# Patient Record
Sex: FEMALE | Race: WHITE | NOT HISPANIC OR LATINO | Employment: FULL TIME | URBAN - METROPOLITAN AREA
[De-identification: names, ages, dates, MRNs, and addresses within clinical notes are randomized per-mention and may not be internally consistent; named-entity substitution may affect disease eponyms.]

---

## 2017-05-26 ENCOUNTER — ALLSCRIPTS OFFICE VISIT (OUTPATIENT)
Dept: OTHER | Facility: OTHER | Age: 27
End: 2017-05-26

## 2017-05-26 DIAGNOSIS — R10.2 PELVIC AND PERINEAL PAIN: ICD-10-CM

## 2017-05-26 PROCEDURE — G0143 SCR C/V CYTO,THINLAYER,RESCR: HCPCS | Performed by: OBSTETRICS & GYNECOLOGY

## 2017-05-27 ENCOUNTER — LAB REQUISITION (OUTPATIENT)
Dept: LAB | Facility: HOSPITAL | Age: 27
End: 2017-05-27
Payer: COMMERCIAL

## 2017-05-27 DIAGNOSIS — Z01.419 ENCOUNTER FOR GYNECOLOGICAL EXAMINATION WITHOUT ABNORMAL FINDING: ICD-10-CM

## 2017-06-05 LAB
LAB AP GYN PRIMARY INTERPRETATION: NORMAL
Lab: NORMAL

## 2017-06-06 ENCOUNTER — GENERIC CONVERSION - ENCOUNTER (OUTPATIENT)
Dept: OTHER | Facility: OTHER | Age: 27
End: 2017-06-06

## 2017-06-12 ENCOUNTER — HOSPITAL ENCOUNTER (OUTPATIENT)
Dept: RADIOLOGY | Age: 27
Discharge: HOME | End: 2017-06-12
Payer: COMMERCIAL

## 2017-06-12 DIAGNOSIS — R10.2 PELVIC AND PERINEAL PAIN: ICD-10-CM

## 2017-06-12 PROCEDURE — 76830 TRANSVAGINAL US NON-OB: CPT

## 2017-06-12 PROCEDURE — 76856 US EXAM PELVIC COMPLETE: CPT

## 2017-06-16 ENCOUNTER — GENERIC CONVERSION - ENCOUNTER (OUTPATIENT)
Dept: OTHER | Facility: OTHER | Age: 27
End: 2017-06-16

## 2017-07-14 ENCOUNTER — APPOINTMENT (OUTPATIENT)
Dept: LAB | Facility: CLINIC | Age: 27
End: 2017-07-14
Payer: COMMERCIAL

## 2017-07-14 ENCOUNTER — TRANSCRIBE ORDERS (OUTPATIENT)
Dept: LAB | Facility: CLINIC | Age: 27
End: 2017-07-14

## 2017-07-14 DIAGNOSIS — Z00.8 HEALTH EXAMINATION IN POPULATION SURVEYS: Primary | ICD-10-CM

## 2017-07-14 DIAGNOSIS — Z00.8 HEALTH EXAMINATION IN POPULATION SURVEYS: ICD-10-CM

## 2017-07-14 LAB
CHOLEST SERPL-MCNC: 172 MG/DL (ref 50–200)
EST. AVERAGE GLUCOSE BLD GHB EST-MCNC: 97 MG/DL
HBA1C MFR BLD: 5 % (ref 4.2–6.3)
HDLC SERPL-MCNC: 59 MG/DL (ref 40–60)
LDLC SERPL CALC-MCNC: 85 MG/DL (ref 0–100)
TRIGL SERPL-MCNC: 140 MG/DL

## 2017-07-14 PROCEDURE — 80061 LIPID PANEL: CPT

## 2017-07-14 PROCEDURE — 83036 HEMOGLOBIN GLYCOSYLATED A1C: CPT

## 2017-07-14 PROCEDURE — 36415 COLL VENOUS BLD VENIPUNCTURE: CPT

## 2017-08-03 ENCOUNTER — ALLSCRIPTS OFFICE VISIT (OUTPATIENT)
Dept: OTHER | Facility: OTHER | Age: 27
End: 2017-08-03

## 2018-01-11 NOTE — RESULT NOTES
Verified Results  (1) THIN PREP PAP WITH IMAGING [id number] 12:00AM Aline Marrero     Test Name Result Flag Reference   LAB AP CASE REPORT (Report)     Gynecologic Cytology Report            Case: [id number]                  Authorizing Provider: Merrick Armstrong MD     Collected:      05/26/2017           First Screen:     ASTON Francis    Received:      05/30/2017 1413        Specimen:  LIQUID-BASED PAP, SCREENING, Endocervical   LAB AP GYN PRIMARY INTERPRETATION      Negative for intraepithelial lesion or malignancy  Electronically signed by ASTON Francis on 6/5/2017 at 4:11 PM   LAB AP GYN SPECIMEN ADEQUACY      Satisfactory for evaluation  Endocervical/transformation zone component present  LAB AP GYN NOTE      This specimen was analyzed by the Thin Prep Imaging System  Due to   technical Imaging issues or the physical properties of the slide specimen,   comprehensive manual rescreening by a Cytotechnologist, and/or Pathologist   was indicated  LAB AP GYN ADDITIONAL INFORMATION (Report)     [employer]'s FDA approved ,  and ThinPrep Imaging System are   utilized with strict adherence to the 's instruction manual to   prepare gynecologic and non-gynecologic cytology specimens for the   production of ThinPrep slides as well as for gynecologic ThinPrep imaging  These processes have been validated by our laboratory and/or by the     The Pap test is not a diagnostic procedure and should not be used as the   sole means to detect cervical cancer  It is only a screening procedure to   aid in the detection of cervical cancer and its precursors  Both   false-negative and false-positive results have been experienced  Your   patient's test result should be interpreted in this context together with   the history and clinical findings

## 2018-01-12 VITALS
DIASTOLIC BLOOD PRESSURE: 66 MMHG | WEIGHT: 143 LBS | BODY MASS INDEX: 26.31 KG/M2 | SYSTOLIC BLOOD PRESSURE: 110 MMHG | HEIGHT: 62 IN

## 2018-01-13 VITALS
DIASTOLIC BLOOD PRESSURE: 70 MMHG | HEART RATE: 107 BPM | WEIGHT: 137.05 LBS | SYSTOLIC BLOOD PRESSURE: 110 MMHG | HEIGHT: 62 IN | BODY MASS INDEX: 25.22 KG/M2 | OXYGEN SATURATION: 99 % | RESPIRATION RATE: 20 BRPM

## 2018-01-13 NOTE — RESULT NOTES
Verified Results  * US PELVIS COMPLETE (TRANSABDOMINAL AND TRANSVAGINAL) [id number] 11:02AM Dottie Hendricks Order Number: [id number]    - Patient Instructions: To schedule this appointment, please contact Central Scheduling at [phone]  Test Name Result Flag Reference   US PELVIS COMPLETE (TRANSABDOMINAL AND TRANSVAGINAL) (Report)     PELVIC ULTRASOUND, COMPLETE     INDICATION: Pelvic pain  COMPARISON: None  TECHNIQUE:  Transabdominal pelvic ultrasound was performed in sagittal and transverse planes with a curvilinear transducer  Additional transvaginal imaging was performed to better evaluate the endometrium and ovaries  Imaging included volumetric    sweeps as well as traditional still imaging technique  FINDINGS:     UTERUS:   The uterus is anteverted in position, measuring 8 8 x 2 8 x 3 9 cm  Contour and echotexture appear normal      The cervix shows no suspicious abnormality  ENDOMETRIUM:    Normal caliber of 4 mm  Homogenous and normal in appearance  OVARIES/ADNEXA:   Right ovary: 2 8 x 1 5 x 2 4 cm  No suspicious right ovarian abnormality  Doppler flow within normal limits  Left ovary: 3 2 x 1 5 x 2 1 cm  No suspicious left ovarian abnormality  Doppler flow within normal limits  No suspicious adnexal mass or loculated collections  There is no free fluid         IMPRESSION:      Normal transabdominal and transvaginal pelvic ultrasound          Workstation performed: [id number]     Signed by:   Tram Lay MD   6/14/17

## 2018-02-23 ENCOUNTER — TELEPHONE (OUTPATIENT)
Dept: INTERNAL MEDICINE CLINIC | Facility: CLINIC | Age: 28
End: 2018-02-23

## 2018-02-23 DIAGNOSIS — Z20.828 EXPOSURE TO THE FLU: Primary | ICD-10-CM

## 2018-02-25 RX ORDER — OSELTAMIVIR PHOSPHATE 75 MG/1
75 CAPSULE ORAL DAILY
Qty: 10 CAPSULE | Refills: 0 | Status: SHIPPED | OUTPATIENT
Start: 2018-02-25 | End: 2018-03-07

## 2018-02-26 ENCOUNTER — OFFICE VISIT (OUTPATIENT)
Dept: INTERNAL MEDICINE CLINIC | Facility: CLINIC | Age: 28
End: 2018-02-26
Payer: COMMERCIAL

## 2018-02-26 VITALS
SYSTOLIC BLOOD PRESSURE: 120 MMHG | BODY MASS INDEX: 25.36 KG/M2 | DIASTOLIC BLOOD PRESSURE: 82 MMHG | OXYGEN SATURATION: 98 % | WEIGHT: 137.8 LBS | HEART RATE: 100 BPM | TEMPERATURE: 98.6 F | HEIGHT: 62 IN | RESPIRATION RATE: 18 BRPM

## 2018-02-26 DIAGNOSIS — J06.9 UPPER RESPIRATORY TRACT INFECTION, UNSPECIFIED TYPE: Primary | ICD-10-CM

## 2018-02-26 PROCEDURE — 99213 OFFICE O/P EST LOW 20 MIN: CPT | Performed by: INTERNAL MEDICINE

## 2018-02-26 RX ORDER — NORGESTIMATE AND ETHINYL ESTRADIOL
KIT
COMMUNITY
Start: 2017-12-08 | End: 2018-05-31 | Stop reason: SDUPTHER

## 2018-02-26 NOTE — PROGRESS NOTES
Assessment/Plan:    URI (upper respiratory infection)    Patient was already given a prescription for Tamiflu to take once daily for 10 days due to exposure to her daughter with influenza  Since patient has developed symptoms, I recommend she take the treatment dose of 1 tablet twice a day for 5 days  Follow up if not improving  Diagnoses and all orders for this visit:    Upper respiratory tract infection, unspecified type    Other orders  -     TRI-LO-SPRINTEC 0 18/0 215/0 25 MG-25 MCG per tablet;           Subjective:      Patient ID: Renee Boyer is a 32 y o  female  Two days ago patient had a temperature of 101 3°, chills, body aches, her daughter was diagnosed with influenza the day previous to this  Patient has cough  The following portions of the patient's history were reviewed and updated as appropriate: allergies, current medications, past family history, past medical history, past social history, past surgical history and problem list     Review of Systems   Constitutional: Positive for chills, fatigue and fever  HENT: Negative for sore throat  Respiratory: Positive for cough  Negative for shortness of breath and wheezing  Cardiovascular: Negative for chest pain  Gastrointestinal: Negative for constipation and diarrhea  Genitourinary: Negative for dysuria  Neurological: Positive for headaches  Objective:      /82   Pulse 100   Temp 98 6 °F (37 °C) (Oral)   Resp 18   Ht 5' 2" (1 575 m)   Wt 62 5 kg (137 lb 12 8 oz)   SpO2 98%   BMI 25 20 kg/m²          Physical Exam   Constitutional: She appears well-developed and well-nourished  HENT:   Mouth/Throat: Oropharyngeal exudate present  Eyes: No scleral icterus  Neck: No tracheal deviation present  Pulmonary/Chest: Effort normal and breath sounds normal  No stridor  No respiratory distress  She has no wheezes  She has no rales  Vitals reviewed

## 2018-02-26 NOTE — ASSESSMENT & PLAN NOTE
Patient was already given a prescription for Tamiflu to take once daily for 10 days due to exposure to her daughter with influenza  Since patient has developed symptoms, I recommend she take the treatment dose of 1 tablet twice a day for 5 days  Follow up if not improving

## 2018-05-29 ENCOUNTER — HOSPITAL ENCOUNTER (EMERGENCY)
Facility: HOSPITAL | Age: 28
Discharge: HOME | End: 2018-05-29
Attending: EMERGENCY MEDICINE
Payer: COMMERCIAL

## 2018-05-29 VITALS
HEART RATE: 86 BPM | RESPIRATION RATE: 18 BRPM | DIASTOLIC BLOOD PRESSURE: 76 MMHG | SYSTOLIC BLOOD PRESSURE: 155 MMHG | TEMPERATURE: 98.4 F | OXYGEN SATURATION: 100 %
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
## 2018-05-29 DIAGNOSIS — S40.861A INSECT BITE OF RIGHT UPPER EXTREMITY, INITIAL ENCOUNTER: Primary | ICD-10-CM

## 2018-05-29 PROCEDURE — 99281 EMR DPT VST MAYX REQ PHY/QHP: CPT

## 2018-05-29 RX ORDER — CEPHALEXIN 500 MG/1
500 CAPSULE ORAL 4 TIMES DAILY
Qty: 28 CAPSULE | Refills: 0 | Status: SHIPPED | OUTPATIENT
Start: 2018-05-29 | End: 2018-06-05

## 2018-05-29 NOTE — DISCHARGE INSTRUCTIONS
Diagnosis: right upper arm insect bite    - please do not scratch area - do not want a secondary bacterial infection     - wash area daily with soap and water     - I will give you a prescription for antibiotics- do nto fill them  A t present- I would only start them for any spreading upward spreading of redness/ any red streaks extending up arm / any fevers- temp > 100 4- or any new/ worsening/concerning symptoms to you

## 2018-05-29 NOTE — ED PROVIDER NOTES
History  Chief Complaint   Patient presents with    Insect Bite     Pt  with a bug bite to right foreram  Pt  with marked circular redness around bite shelia  Bite happened on Saturday     27 yr feale states probable bug bite to r forearm yesterday -- did not see bug-- today with midl surrounding reddness--  midly itchy -- no fevers/systemic comps-- no other comps        History provided by:  Patient   used: No        Prior to Admission Medications   Prescriptions Last Dose Informant Patient Reported? Taking? TRI-LO-SPRINTEC 0 18/0 215/0 25 MG-25 MCG per tablet   Yes No      Facility-Administered Medications: None       History reviewed  No pertinent past medical history  Past Surgical History:   Procedure Laterality Date    TONSILLECTOMY  2013       Family History   Problem Relation Age of Onset    Lymphoma Father      I have reviewed and agree with the history as documented  Social History   Substance Use Topics    Smoking status: Never Smoker    Smokeless tobacco: Never Used    Alcohol use No        Review of Systems   Constitutional: Negative  HENT: Negative  Eyes: Negative  Respiratory: Negative  Cardiovascular: Negative  Gastrointestinal: Negative  Endocrine: Negative  Genitourinary: Negative  Musculoskeletal: Negative  Skin: Positive for rash  Negative for color change, pallor and wound  Allergic/Immunologic: Negative  Neurological: Negative  Hematological: Negative  Psychiatric/Behavioral: Negative  Physical Exam  Physical Exam   Constitutional: She is oriented to person, place, and time  She appears well-developed and well-nourished  No distress  avss-- pulse ox 100 % onb ra- interpretation is normal- no intervention- well appearing- in nad   HENT:   Head: Normocephalic and atraumatic  Eyes: Conjunctivae and EOM are normal  Pupils are equal, round, and reactive to light  Right eye exhibits no discharge   Left eye exhibits no discharge  No scleral icterus  Neck: Normal range of motion  Neck supple  No JVD present  No tracheal deviation present  No thyromegaly present  Cardiovascular: Normal rate, regular rhythm, normal heart sounds and intact distal pulses  Exam reveals no gallop and no friction rub  No murmur heard  Pulmonary/Chest: Effort normal and breath sounds normal  No stridor  No respiratory distress  She has no wheezes  She has no rales  She exhibits no tenderness  Abdominal: Soft  Bowel sounds are normal  She exhibits no distension and no mass  There is no tenderness  There is no rebound and no guarding  No hernia  Musculoskeletal: Normal range of motion  She exhibits no edema, tenderness or deformity  Lymphadenopathy:     She has no cervical adenopathy  Neurological: She is alert and oriented to person, place, and time  No cranial nerve deficit or sensory deficit  She exhibits normal muscle tone  Coordination normal    Skin: Skin is warm  Capillary refill takes less than 2 seconds  Rash noted  She is not diaphoretic  There is erythema  No pallor  r mid flexor forearm with quarter sized area of erythema with central papule-- no abscess/crepitus/necrosis// no ascending erythema-- no r axillary adenopahty   Psychiatric: She has a normal mood and affect  Her behavior is normal  Judgment and thought content normal    Nursing note and vitals reviewed        Vital Signs  ED Triage Vitals [05/29/18 1801]   Temperature Pulse Respirations Blood Pressure SpO2   98 4 °F (36 9 °C) 86 18 155/76 100 %      Temp Source Heart Rate Source Patient Position - Orthostatic VS BP Location FiO2 (%)   Oral Monitor Sitting Left arm --      Pain Score       No Pain           Vitals:    05/29/18 1801   BP: 155/76   Pulse: 86   Patient Position - Orthostatic VS: Sitting       Visual Acuity      ED Medications  Medications - No data to display    Diagnostic Studies  Results Reviewed     None                 No orders to display Procedures  Procedures       Phone Contacts  ED Phone Contact    ED Course                               MDM  CritCare Time    Disposition  Final diagnoses:   None     ED Disposition     None      Follow-up Information    None         Patient's Medications   Discharge Prescriptions    No medications on file     No discharge procedures on file      ED Provider  Electronically Signed by           Noe Stover MD  05/30/18 4068

## 2018-05-30 NOTE — PROGRESS NOTES
Assessment/Plan:    Encounter for gynecological examination without abnormal finding    Surveillance for birth control, oral contraceptives  -     TRI-LO-SPRINTEC 0 18/0 215/0 25 MG-25 MCG per tablet; Take 1 tablet by mouth daily      She will call with her next menstrual period for [place] placement  Discussed risks, benefits and alternatives with her  Subjective:      Patient ID: Karmen Cho is a 32 y o  female  Yearly exam  , has a daughter - 3yo  Menarche: 10  LMP: 2018  PAP: 2017 neg  Contraception: OCPs, but considering Merlene Fields is new patient here for annual exam, saw Dr Arina Cantrell last year  She is doing well  Is getting  this Saturday in [place]! She is very excited - will be using [employer]! She would like to consider a [place] as she does not plan on having any more kids  Works as  with orthopedics  No bowel or bladder concerns  Thinks her breasts are slightly asymetric, but have always been  No significant PMHx or PSHx  Has some occasional deep dyspareunia, had normal US previously  Some positions are more uncomfortable than others  The following portions of the patient's history were reviewed and updated as appropriate: allergies, current medications, past family history, past medical history, past social history, past surgical history and problem list     Review of Systems   Constitutional: Negative for activity change and unexpected weight change  Gastrointestinal: Negative for abdominal distention, abdominal pain, constipation and diarrhea  Genitourinary: Negative for dyspareunia, dysuria, menstrual problem, pelvic pain, vaginal bleeding, vaginal discharge and vaginal pain  Objective:    /70   Ht 5' 1" (1 549 m)   Wt 60 7 kg (133 lb 12 8 oz)   LMP 2018   BMI 25 28 kg/m²        Physical Exam   Constitutional: She is oriented to person, place, and time   She appears well-developed and well-nourished  No distress  Pulmonary/Chest: No respiratory distress  Right breast exhibits no inverted nipple, no mass, no nipple discharge, no skin change and no tenderness  Left breast exhibits no inverted nipple, no mass, no nipple discharge, no skin change and no tenderness  Abdominal: Soft  Normal appearance  There is no tenderness  Genitourinary: Vagina normal  There is no rash or lesion on the right labia  There is no rash or lesion on the left labia  Uterus is not enlarged and not tender  Cervix exhibits no motion tenderness  Right adnexum displays no mass and no tenderness  Left adnexum displays no mass and no tenderness  No vaginal discharge found  Musculoskeletal: She exhibits no edema  Neurological: She is alert and oriented to person, place, and time  Skin: Skin is warm and dry  Psychiatric: She has a normal mood and affect  Vitals reviewed

## 2018-05-31 ENCOUNTER — OFFICE VISIT (OUTPATIENT)
Dept: OBGYN CLINIC | Facility: CLINIC | Age: 28
End: 2018-05-31
Payer: COMMERCIAL

## 2018-05-31 VITALS
DIASTOLIC BLOOD PRESSURE: 70 MMHG | BODY MASS INDEX: 25.26 KG/M2 | WEIGHT: 133.8 LBS | SYSTOLIC BLOOD PRESSURE: 102 MMHG | HEIGHT: 61 IN

## 2018-05-31 DIAGNOSIS — Z01.419 ENCOUNTER FOR GYNECOLOGICAL EXAMINATION WITHOUT ABNORMAL FINDING: Primary | ICD-10-CM

## 2018-05-31 DIAGNOSIS — Z30.41 SURVEILLANCE FOR BIRTH CONTROL, ORAL CONTRACEPTIVES: ICD-10-CM

## 2018-05-31 PROBLEM — J06.9 URI (UPPER RESPIRATORY INFECTION): Status: RESOLVED | Noted: 2018-02-26 | Resolved: 2018-05-31

## 2018-05-31 PROCEDURE — 99385 PREV VISIT NEW AGE 18-39: CPT | Performed by: OBSTETRICS & GYNECOLOGY

## 2018-05-31 RX ORDER — NORGESTIMATE AND ETHINYL ESTRADIOL
1 KIT DAILY
Qty: 28 TABLET | Refills: 3 | Status: SHIPPED | OUTPATIENT
Start: 2018-05-31 | End: 2018-09-06 | Stop reason: SDUPTHER

## 2018-06-04 ENCOUNTER — TELEPHONE (OUTPATIENT)
Dept: OBGYN CLINIC | Facility: CLINIC | Age: 28
End: 2018-06-04

## 2018-06-04 NOTE — TELEPHONE ENCOUNTER
Apologies - await [employer] delivery from [facility]

## 2018-06-04 NOTE — TELEPHONE ENCOUNTER
Lakesha Villafana labeled and placed in the [facility] med room  Pt can be contacted to schedule insertion  Thank you!

## 2018-06-07 NOTE — TELEPHONE ENCOUNTER
Matias and [employer] labeled and placed in the [facility] room  Pt can be contacted for insertion  Thank you!

## 2018-06-07 NOTE — TELEPHONE ENCOUNTER
Matias and [employer] labeled and placed in [facility] room  Pt can be scheduled for insertion  Thank you!

## 2018-06-10 ENCOUNTER — OFFICE VISIT (OUTPATIENT)
Dept: URGENT CARE | Age: 28
End: 2018-06-10
Payer: COMMERCIAL

## 2018-06-10 VITALS
WEIGHT: 135 LBS | TEMPERATURE: 98.6 F | BODY MASS INDEX: 23.92 KG/M2 | RESPIRATION RATE: 20 BRPM | SYSTOLIC BLOOD PRESSURE: 128 MMHG | DIASTOLIC BLOOD PRESSURE: 92 MMHG | HEART RATE: 94 BPM | HEIGHT: 63 IN

## 2018-06-10 DIAGNOSIS — J06.9 UPPER RESPIRATORY TRACT INFECTION, UNSPECIFIED TYPE: ICD-10-CM

## 2018-06-10 DIAGNOSIS — H66.002 ACUTE SUPPURATIVE OTITIS MEDIA OF LEFT EAR WITHOUT SPONTANEOUS RUPTURE OF TYMPANIC MEMBRANE, RECURRENCE NOT SPECIFIED: Primary | ICD-10-CM

## 2018-06-10 PROCEDURE — 99213 OFFICE O/P EST LOW 20 MIN: CPT | Performed by: FAMILY MEDICINE

## 2018-06-10 PROCEDURE — S9088 SERVICES PROVIDED IN URGENT: HCPCS | Performed by: FAMILY MEDICINE

## 2018-06-10 RX ORDER — AMOXICILLIN 500 MG/1
500 TABLET, FILM COATED ORAL 3 TIMES DAILY
Qty: 30 TABLET | Refills: 0 | Status: SHIPPED | COMMUNITY
Start: 2018-06-10 | End: 2018-06-20

## 2018-06-10 NOTE — PROGRESS NOTES
[facility]        NAME: Amparo Boykin is a 32 y o  female  : 1990    MRN: [id number]  DATE: Nahomy 10, 2018  TIME: 1:26 PM    Assessment and Plan   Acute suppurative otitis media of left ear without spontaneous rupture of tympanic membrane, recurrence not specified [H66 002]  1  Acute suppurative otitis media of left ear without spontaneous rupture of tympanic membrane, recurrence not specified  amoxicillin (AMOXIL) 500 MG tablet   2  Upper respiratory tract infection, unspecified type           Patient Instructions     Patient Instructions   1  Take antibiotic as directed  2  Recommend daily probiotic while on antibiotic or eat yogurt with live cultures daily while on antibiotic  3  Push fluids  4  Continue decongestant by mouth and add Nasacort AQ and / or nasal saline rinses  5   Advil for any pain  6   Follow up with PCP if not improving over next 7-10 days  Chief Complaint     Chief Complaint   Patient presents with   Jose Vasquez     She feels that her left ear is clogged and the right slightly- just returned from a trip and was flying         History of Present Illness   Amparo Boykin presents to the clinic c/o    40-year-old female that developed cold symptoms when she was on her honeymoon in [place] this last week  She reported green to yellow mucus just coming out the left side of her nose  Still has some of that drainage however now her left ear is completely clogged  She is not able to equalize the pressure  She tried a decongestant last night without any relief  Denies history of recurrent sinus or ear infections  Review of Systems   Review of Systems   Constitutional: Negative  HENT: Positive for congestion, ear pain, hearing loss, postnasal drip and rhinorrhea  Negative for ear discharge and sore throat  Eyes: Negative  Respiratory: Negative            Current Medications     Long-Term Prescriptions   Medication Sig Dispense Refill    TRI-LO-SPRINTEC 0 18/0 215/0 25 MG-25 MCG per tablet Take 1 tablet by mouth daily 28 tablet 3       Current Allergies     Allergies as of 06/10/2018 - Reviewed 06/10/2018   Allergen Reaction Noted    Sulfa antibiotics  03/09/2015            The following portions of the patient's history were reviewed and updated as appropriate: allergies, current medications, past family history, past medical history, past social history, past surgical history and problem list     Objective   /92 (BP Location: Right arm, Patient Position: Sitting, Cuff Size: Standard)   Pulse 94   Temp 98 6 °F (37 °C) (Temporal)   Resp 20   Ht 5' 3" (1 6 m)   Wt 61 2 kg (135 lb)   LMP 06/03/2018   BMI 23 91 kg/m²        Physical Exam     Physical Exam   Constitutional: She is oriented to person, place, and time  She appears well-developed and well-nourished  No distress  Appears acutely ill but in no acute distress   HENT:   Head: Normocephalic and atraumatic  Right Ear: External ear normal    Left Ear: Ear canal normal  There is swelling  No drainage or tenderness  No foreign bodies  No mastoid tenderness  Tympanic membrane is injected, erythematous and bulging  Tympanic membrane mobility is abnormal  A middle ear effusion is present  Decreased hearing is noted  Cobblestoning of posterior pharynx with patchy redness  No exudate or fetid breath  Nasal turbinates red and edematous  No purulent mucus   Eyes: Conjunctivae and EOM are normal  Pupils are equal, round, and reactive to light  Right eye exhibits no discharge  Left eye exhibits no discharge  Neck: Normal range of motion  Neck supple  Cardiovascular: Normal rate, regular rhythm and normal heart sounds  Pulmonary/Chest: Effort normal and breath sounds normal  No respiratory distress  Lymphadenopathy:     She has no cervical adenopathy  Neurological: She is alert and oriented to person, place, and time  Skin: Skin is warm and dry  No rash noted   She is not diaphoretic  Psychiatric: She has a normal mood and affect  Nursing note and vitals reviewed

## 2018-06-10 NOTE — PATIENT INSTRUCTIONS
1  Take antibiotic as directed  2  Recommend daily probiotic while on antibiotic or eat yogurt with live cultures daily while on antibiotic  3  Push fluids  4  Continue decongestant by mouth and add Nasacort AQ and / or nasal saline rinses  5   Advil for any pain  6   Follow up with PCP if not improving over next 7-10 days

## 2018-06-13 ENCOUNTER — TELEPHONE (OUTPATIENT)
Dept: OBGYN CLINIC | Facility: CLINIC | Age: 28
End: 2018-06-13

## 2018-06-13 NOTE — TELEPHONE ENCOUNTER
Pt saw missed - asked if she wanted to candelaria IUD insertion & she said she's still thinking about whether or not she wants to go this route - she will call back at a later date

## 2018-07-12 ENCOUNTER — TRANSCRIBE ORDERS (OUTPATIENT)
Dept: LAB | Facility: CLINIC | Age: 28
End: 2018-07-12

## 2018-07-12 ENCOUNTER — APPOINTMENT (OUTPATIENT)
Dept: LAB | Facility: CLINIC | Age: 28
End: 2018-07-12
Payer: COMMERCIAL

## 2018-07-12 DIAGNOSIS — Z00.8 HEALTH EXAMINATION IN POPULATION SURVEY: Primary | ICD-10-CM

## 2018-07-12 DIAGNOSIS — Z00.8 HEALTH EXAMINATION IN POPULATION SURVEY: ICD-10-CM

## 2018-07-12 LAB
CHOLEST SERPL-MCNC: 163 MG/DL (ref 50–200)
EST. AVERAGE GLUCOSE BLD GHB EST-MCNC: 94 MG/DL
HBA1C MFR BLD: 4.9 % (ref 4.2–6.3)
HDLC SERPL-MCNC: 64 MG/DL (ref 40–60)
LDLC SERPL CALC-MCNC: 83 MG/DL (ref 0–100)
NONHDLC SERPL-MCNC: 99 MG/DL
TRIGL SERPL-MCNC: 81 MG/DL

## 2018-07-12 PROCEDURE — 36415 COLL VENOUS BLD VENIPUNCTURE: CPT

## 2018-07-12 PROCEDURE — 80061 LIPID PANEL: CPT

## 2018-07-12 PROCEDURE — 83036 HEMOGLOBIN GLYCOSYLATED A1C: CPT

## 2018-09-06 DIAGNOSIS — Z30.41 SURVEILLANCE FOR BIRTH CONTROL, ORAL CONTRACEPTIVES: Primary | ICD-10-CM

## 2018-09-07 RX ORDER — NORGESTIMATE AND ETHINYL ESTRADIOL
1 KIT DAILY
Qty: 90 TABLET | Refills: 2 | Status: SHIPPED | OUTPATIENT
Start: 2018-09-07 | End: 2019-05-20 | Stop reason: SDUPTHER

## 2018-11-04 ENCOUNTER — OFFICE VISIT (OUTPATIENT)
Dept: URGENT CARE | Facility: CLINIC | Age: 28
End: 2018-11-04
Payer: COMMERCIAL

## 2018-11-04 VITALS
DIASTOLIC BLOOD PRESSURE: 82 MMHG | TEMPERATURE: 98.4 F | WEIGHT: 141.2 LBS | BODY MASS INDEX: 25.98 KG/M2 | OXYGEN SATURATION: 99 % | HEART RATE: 73 BPM | RESPIRATION RATE: 16 BRPM | SYSTOLIC BLOOD PRESSURE: 124 MMHG | HEIGHT: 62 IN

## 2018-11-04 DIAGNOSIS — J01.00 ACUTE MAXILLARY SINUSITIS, RECURRENCE NOT SPECIFIED: Primary | ICD-10-CM

## 2018-11-04 PROCEDURE — S9088 SERVICES PROVIDED IN URGENT: HCPCS | Performed by: FAMILY MEDICINE

## 2018-11-04 PROCEDURE — 99213 OFFICE O/P EST LOW 20 MIN: CPT | Performed by: FAMILY MEDICINE

## 2018-11-04 RX ORDER — AZITHROMYCIN 250 MG/1
TABLET, FILM COATED ORAL
Qty: 6 TABLET | Refills: 0 | Status: SHIPPED | COMMUNITY
Start: 2018-11-04 | End: 2018-11-08

## 2018-11-04 NOTE — PROGRESS NOTES
[facility]        NAME: Carlyn Driscoll is a 32 y o  female  : 1990    MRN: [id number]  DATE: 2018  TIME: 11:31 AM    Assessment and Plan   Acute maxillary sinusitis, recurrence not specified [J01 00]  1  Acute maxillary sinusitis, recurrence not specified  azithromycin (ZITHROMAX) 250 mg tablet         Patient Instructions   Patient Instructions   Continue nasocort daily  Azithromycin 250mg 2 tablets today then 1 tablet daily for the next 4 days  Increase fluid intake        Proceed to  ER if symptoms worsen  Chief Complaint     Chief Complaint   Patient presents with    Cold Like Symptoms     last week pt started with cold sxs-congestion, no fever  3 days ago b/l ear pain started which has increased  pt is taking ibuprofen, nasal spray and a nasal decongestion  History of Present Illness       Patient with one week hx of sinus congestion and pressure that 3 days ago started to improve than yesterday acutely worsened with increasing sinus pressure and pain, dark mucus and bilateral ear pain  No fever or chills, mild sore throat, no cough, chest tightness, sob or wheezing  She has maxillary facial pain and headache  She has been using nasocort NS without improvement  Review of Systems   Review of Systems   Constitutional: Negative  HENT: Positive for congestion, ear pain, sinus pain, sinus pressure and sore throat  Eyes: Negative  Respiratory: Negative  Cardiovascular: Negative            Current Medications       Current Outpatient Prescriptions:     TRI-LO-SPRINTEC 0 18/0 215/0 25 MG-25 MCG per tablet, Take 1 tablet by mouth daily, Disp: 90 tablet, Rfl: 2    azithromycin (ZITHROMAX) 250 mg tablet, 2 tablets today then 1 tablet for the next 4 days, Disp: 6 tablet, Rfl: 0    Current Allergies     Allergies as of 2018 - Reviewed 2018   Allergen Reaction Noted    Sulfa antibiotics Other (See Comments) and Hives 01/10/2010            The following portions of the patient's history were reviewed and updated as appropriate: allergies, current medications, past family history, past medical history, past social history, past surgical history and problem list      History reviewed  No pertinent past medical history  Past Surgical History:   Procedure Laterality Date    ADENOIDECTOMY      TONSILLECTOMY  2013    WISDOM TOOTH EXTRACTION         Family History   Problem Relation Age of Onset    Lymphoma Father     Hypertension Father     Osteoporosis Mother     Hypertension Mother     Breast cancer Maternal Grandmother     Heart disease Paternal Grandmother          Medications have been verified  Objective   /82 (BP Location: Left arm, Patient Position: Sitting)   Pulse 73   Temp 98 4 °F (36 9 °C) (Tympanic)   Resp 16   Ht 5' 2" (1 575 m)   Wt 64 kg (141 lb 3 2 oz)   SpO2 99%   BMI 25 83 kg/m²        Physical Exam     Physical Exam   Constitutional: She appears well-developed and well-nourished  No distress  HENT:   Head: Normocephalic and atraumatic  Right Ear: External ear normal    Left Ear: External ear normal    Mouth/Throat: Oropharynx is clear and moist  No oropharyngeal exudate  Intranasal mucosal erythema, edema and mucopurulent rhinorrhea, TTP maxillary sinuses  Pharynx clear   Eyes: Conjunctivae are normal    Neck: Neck supple  Cardiovascular: Normal rate, regular rhythm and normal heart sounds  Pulmonary/Chest: Effort normal and breath sounds normal    Lymphadenopathy:     She has no cervical adenopathy

## 2019-05-20 DIAGNOSIS — Z30.41 SURVEILLANCE FOR BIRTH CONTROL, ORAL CONTRACEPTIVES: ICD-10-CM

## 2019-05-21 RX ORDER — NORGESTIMATE AND ETHINYL ESTRADIOL
1 KIT DAILY
Qty: 90 TABLET | Refills: 0 | Status: SHIPPED | OUTPATIENT
Start: 2019-05-21 | End: 2019-07-25 | Stop reason: ALTCHOICE

## 2019-07-25 ENCOUNTER — ANNUAL EXAM (OUTPATIENT)
Dept: OBGYN CLINIC | Facility: CLINIC | Age: 29
End: 2019-07-25
Payer: COMMERCIAL

## 2019-07-25 VITALS
SYSTOLIC BLOOD PRESSURE: 128 MMHG | WEIGHT: 143.6 LBS | BODY MASS INDEX: 26.43 KG/M2 | DIASTOLIC BLOOD PRESSURE: 72 MMHG | HEIGHT: 62 IN

## 2019-07-25 DIAGNOSIS — Z01.419 ENCOUNTER FOR GYNECOLOGICAL EXAMINATION (GENERAL) (ROUTINE) WITHOUT ABNORMAL FINDINGS: Primary | ICD-10-CM

## 2019-07-25 PROCEDURE — 99395 PREV VISIT EST AGE 18-39: CPT | Performed by: OBSTETRICS & GYNECOLOGY

## 2019-07-25 NOTE — PROGRESS NOTES
Da[facility]  1990    CC:  Yearly exam    S:  29 y o  female here for yearly exam  She is sexually active  She is not using contraception, stopped her OCPs last month- hoping for baby #2! She has had one cycle since stopping her pills, which was a little heavier but otherwise seemed normal     Daughter is 2yo and well  She is taking a PNV  Continues to work as  for ortho  No other health changes  She has no bowel, bladder, breast concerns  She denies dyspareunia, discharge, pelvic pain  Last Pap: 5/2017 - Normal Cytology      Current Outpatient Medications:     Prenatal MV-Min-Fe Fum-FA-DHA (PRENATAL+DHA PO), Take by mouth, Disp: , Rfl:     TRI-LO-SPRINTEC 0 18/0 215/0 25 MG-25 MCG per tablet, Take 1 tablet by mouth daily (Patient not taking: Reported on 7/25/2019), Disp: 90 tablet, Rfl: 0  History reviewed  No pertinent past medical history  Past Surgical History:   Procedure Laterality Date    ADENOIDECTOMY      TONSILLECTOMY  2013    WISDOM TOOTH EXTRACTION       Family History   Problem Relation Age of Onset    Lymphoma Father     Hypertension Father     Osteoporosis Mother     Hypertension Mother     Breast cancer Maternal Grandmother     Heart disease Paternal Grandmother        O:  Blood pressure 128/72, height 5' 1 75" (1 568 m), weight 65 1 kg (143 lb 9 6 oz), last menstrual period 06/27/2019  Patient appears well and is not in distress  Breasts are symmetrical without mass, tenderness, nipple discharge, skin changes or adenopathy  Abdomen is soft and nontender without masses  External genitals are normal without lesions or rashes  Vagina is normal without discharge or bleeding  Cervix is normal without discharge or lesion  Uterus is normal, mobile, nontender without palpable mass  Adnexa are normal, nontender, without palpable mass       A:  Yearly exam      P:    Pap 2020  Reviewed to call with positive UPT for US to be scheduled ~ 8 weeks   Discussed when to be concerned if not pregnant ( 1 year)   Continue PNV

## 2019-09-01 ENCOUNTER — APPOINTMENT (EMERGENCY)
Dept: RADIOLOGY | Facility: HOSPITAL | Age: 29
End: 2019-09-01
Payer: COMMERCIAL

## 2019-09-01 ENCOUNTER — HOSPITAL ENCOUNTER (EMERGENCY)
Facility: HOSPITAL | Age: 29
Discharge: LEFT BEFORE DISCHARGE | End: 2019-09-01
Attending: EMERGENCY MEDICINE
Payer: COMMERCIAL

## 2019-09-01 VITALS
DIASTOLIC BLOOD PRESSURE: 68 MMHG | BODY MASS INDEX: 26.46 KG/M2 | HEART RATE: 92 BPM | WEIGHT: 143.52 LBS | TEMPERATURE: 98.6 F | OXYGEN SATURATION: 100 % | RESPIRATION RATE: 21 BRPM | SYSTOLIC BLOOD PRESSURE: 120 MMHG

## 2019-09-01 DIAGNOSIS — O26.891 ABDOMINAL PAIN DURING PREGNANCY IN FIRST TRIMESTER: Primary | ICD-10-CM

## 2019-09-01 DIAGNOSIS — R94.31 ECG ABNORMAL: ICD-10-CM

## 2019-09-01 DIAGNOSIS — R10.9 ABDOMINAL PAIN DURING PREGNANCY IN FIRST TRIMESTER: Primary | ICD-10-CM

## 2019-09-01 LAB
ALBUMIN SERPL BCP-MCNC: 4.6 G/DL (ref 3.5–5)
ALP SERPL-CCNC: 56 U/L (ref 46–116)
ALT SERPL W P-5'-P-CCNC: 21 U/L (ref 12–78)
ANION GAP SERPL CALCULATED.3IONS-SCNC: 9 MMOL/L (ref 4–13)
AST SERPL W P-5'-P-CCNC: 10 U/L (ref 5–45)
ATRIAL RATE: 86 BPM
ATRIAL RATE: 95 BPM
B-HCG SERPL-ACNC: 182 MIU/ML
BACTERIA UR QL AUTO: ABNORMAL /HPF
BASOPHILS # BLD AUTO: 0.03 THOUSANDS/ΜL (ref 0–0.1)
BASOPHILS NFR BLD AUTO: 0 % (ref 0–1)
BILIRUB SERPL-MCNC: 0.62 MG/DL (ref 0.2–1)
BILIRUB UR QL STRIP: NEGATIVE
BUN SERPL-MCNC: 12 MG/DL (ref 5–25)
CALCIUM SERPL-MCNC: 9.2 MG/DL (ref 8.3–10.1)
CHLORIDE SERPL-SCNC: 106 MMOL/L (ref 100–108)
CLARITY UR: CLEAR
CO2 SERPL-SCNC: 24 MMOL/L (ref 21–32)
COLOR UR: YELLOW
COLOR, POC: NORMAL
CREAT SERPL-MCNC: 0.74 MG/DL (ref 0.6–1.3)
DEPRECATED D DIMER PPP: 333 NG/ML (FEU)
EOSINOPHIL # BLD AUTO: 0.32 THOUSAND/ΜL (ref 0–0.61)
EOSINOPHIL NFR BLD AUTO: 4 % (ref 0–6)
ERYTHROCYTE [DISTWIDTH] IN BLOOD BY AUTOMATED COUNT: 13.3 % (ref 11.6–15.1)
EXT PREG TEST URINE: NEGATIVE
EXT. CONTROL ED NAV: NORMAL
GFR SERPL CREATININE-BSD FRML MDRD: 111 ML/MIN/1.73SQ M
GLUCOSE SERPL-MCNC: 92 MG/DL (ref 65–140)
GLUCOSE UR STRIP-MCNC: NEGATIVE MG/DL
HCT VFR BLD AUTO: 44.2 % (ref 34.8–46.1)
HGB BLD-MCNC: 14.8 G/DL (ref 11.5–15.4)
HGB UR QL STRIP.AUTO: NEGATIVE
HYALINE CASTS #/AREA URNS LPF: ABNORMAL /LPF
IMM GRANULOCYTES # BLD AUTO: 0.01 THOUSAND/UL (ref 0–0.2)
IMM GRANULOCYTES NFR BLD AUTO: 0 % (ref 0–2)
KETONES UR STRIP-MCNC: NEGATIVE MG/DL
LEUKOCYTE ESTERASE UR QL STRIP: ABNORMAL
LIPASE SERPL-CCNC: 127 U/L (ref 73–393)
LYMPHOCYTES # BLD AUTO: 2.44 THOUSANDS/ΜL (ref 0.6–4.47)
LYMPHOCYTES NFR BLD AUTO: 33 % (ref 14–44)
MCH RBC QN AUTO: 28.8 PG (ref 26.8–34.3)
MCHC RBC AUTO-ENTMCNC: 33.5 G/DL (ref 31.4–37.4)
MCV RBC AUTO: 86 FL (ref 82–98)
MONOCYTES # BLD AUTO: 0.61 THOUSAND/ΜL (ref 0.17–1.22)
MONOCYTES NFR BLD AUTO: 8 % (ref 4–12)
NEUTROPHILS # BLD AUTO: 3.92 THOUSANDS/ΜL (ref 1.85–7.62)
NEUTS SEG NFR BLD AUTO: 55 % (ref 43–75)
NITRITE UR QL STRIP: NEGATIVE
NON-SQ EPI CELLS URNS QL MICRO: ABNORMAL /HPF
NRBC BLD AUTO-RTO: 0 /100 WBCS
P AXIS: 56 DEGREES
P AXIS: 66 DEGREES
PH UR STRIP.AUTO: 5.5 [PH] (ref 4.5–8)
PLATELET # BLD AUTO: 308 THOUSANDS/UL (ref 149–390)
PMV BLD AUTO: 10 FL (ref 8.9–12.7)
POTASSIUM SERPL-SCNC: 3.6 MMOL/L (ref 3.5–5.3)
PR INTERVAL: 130 MS
PR INTERVAL: 142 MS
PROT SERPL-MCNC: 8 G/DL (ref 6.4–8.2)
PROT UR STRIP-MCNC: NEGATIVE MG/DL
QRS AXIS: 69 DEGREES
QRS AXIS: 77 DEGREES
QRSD INTERVAL: 74 MS
QRSD INTERVAL: 76 MS
QT INTERVAL: 322 MS
QT INTERVAL: 338 MS
QTC INTERVAL: 404 MS
QTC INTERVAL: 404 MS
RBC # BLD AUTO: 5.14 MILLION/UL (ref 3.81–5.12)
RBC #/AREA URNS AUTO: ABNORMAL /HPF
SODIUM SERPL-SCNC: 139 MMOL/L (ref 136–145)
SP GR UR STRIP.AUTO: 1.01 (ref 1–1.03)
T WAVE AXIS: -1 DEGREES
T WAVE AXIS: -67 DEGREES
TROPONIN I SERPL-MCNC: <0.02 NG/ML
TROPONIN I SERPL-MCNC: <0.02 NG/ML
UROBILINOGEN UR QL STRIP.AUTO: 0.2 E.U./DL
VENTRICULAR RATE: 86 BPM
VENTRICULAR RATE: 95 BPM
WBC # BLD AUTO: 7.33 THOUSAND/UL (ref 4.31–10.16)
WBC #/AREA URNS AUTO: ABNORMAL /HPF

## 2019-09-01 PROCEDURE — 87086 URINE CULTURE/COLONY COUNT: CPT

## 2019-09-01 PROCEDURE — 99284 EMERGENCY DEPT VISIT MOD MDM: CPT

## 2019-09-01 PROCEDURE — 80053 COMPREHEN METABOLIC PANEL: CPT | Performed by: EMERGENCY MEDICINE

## 2019-09-01 PROCEDURE — 96361 HYDRATE IV INFUSION ADD-ON: CPT

## 2019-09-01 PROCEDURE — 84702 CHORIONIC GONADOTROPIN TEST: CPT | Performed by: EMERGENCY MEDICINE

## 2019-09-01 PROCEDURE — 84484 ASSAY OF TROPONIN QUANT: CPT | Performed by: EMERGENCY MEDICINE

## 2019-09-01 PROCEDURE — 87147 CULTURE TYPE IMMUNOLOGIC: CPT

## 2019-09-01 PROCEDURE — 83690 ASSAY OF LIPASE: CPT | Performed by: EMERGENCY MEDICINE

## 2019-09-01 PROCEDURE — 93005 ELECTROCARDIOGRAM TRACING: CPT

## 2019-09-01 PROCEDURE — 81001 URINALYSIS AUTO W/SCOPE: CPT

## 2019-09-01 PROCEDURE — 76700 US EXAM ABDOM COMPLETE: CPT

## 2019-09-01 PROCEDURE — 85379 FIBRIN DEGRADATION QUANT: CPT | Performed by: EMERGENCY MEDICINE

## 2019-09-01 PROCEDURE — 81025 URINE PREGNANCY TEST: CPT | Performed by: EMERGENCY MEDICINE

## 2019-09-01 PROCEDURE — 99285 EMERGENCY DEPT VISIT HI MDM: CPT | Performed by: EMERGENCY MEDICINE

## 2019-09-01 PROCEDURE — 93010 ELECTROCARDIOGRAM REPORT: CPT | Performed by: INTERNAL MEDICINE

## 2019-09-01 PROCEDURE — 76815 OB US LIMITED FETUS(S): CPT

## 2019-09-01 PROCEDURE — 96360 HYDRATION IV INFUSION INIT: CPT

## 2019-09-01 PROCEDURE — 85025 COMPLETE CBC W/AUTO DIFF WBC: CPT | Performed by: EMERGENCY MEDICINE

## 2019-09-01 PROCEDURE — 36415 COLL VENOUS BLD VENIPUNCTURE: CPT | Performed by: EMERGENCY MEDICINE

## 2019-09-01 RX ORDER — ACETAMINOPHEN 325 MG/1
650 TABLET ORAL ONCE
Status: COMPLETED | OUTPATIENT
Start: 2019-09-01 | End: 2019-09-01

## 2019-09-01 RX ADMIN — ACETAMINOPHEN 650 MG: 325 TABLET ORAL at 08:11

## 2019-09-01 RX ADMIN — SODIUM CHLORIDE 1000 ML: 0.9 INJECTION, SOLUTION INTRAVENOUS at 07:48

## 2019-09-01 NOTE — ED RE-EVALUATION NOTE
Patient now states that when the pain woke her this morning she had associated diaphoresis  She points to her left upper quadrant also her ribcage  Patient is mildly tachycardic  EKG was obtained and shows flipped T-waves inferiorly and nonspecific T waves, no previous EKG for comparison  She currently denies any chest pain or shortness of breath  She continues to deny any pleuritic pain  Patient does not perc out given heart rate that was mildly tachycardic, but currently improved and otherwise low risk Well's  Heart score 1        Antonino Lopez MD  09/04/19 4937

## 2019-09-01 NOTE — ED ATTENDING ATTESTATION
Isac Arias MD, saw and evaluated the patient  I have discussed the patient with the resident/non-physician practitioner and agree with the resident's/non-physician practitioner's findings, Plan of Care, and MDM as documented in the resident's/non-physician practitioner's note, except where noted  All available labs and Radiology studies were reviewed  I was present for key portions of any procedure(s) performed by the resident/non-physician practitioner and I was immediately available to provide assistance  At this point I agree with the current assessment done in the Emergency Department  I have conducted an independent evaluation of this patient a history and physical is as follows:    OA:  c/o LUQ pain that began this morning upon awakening  LMP 8, took a home pregnancy test yesterday that was positive after developing some mild lower abdominal discomfort  + mild nausea, no vomiting  LUQ is sharp, intermittent, has lessened since onset this morning, improves with movement  No relation to respiration, denies pleuritic component to pain  No cp/pressure/palpitations/sob/BECERRA  No vaginal bleeding or dc  No recent strenuous activity, no new foods  No fevers/chills  Otherwise feeling well  PE, well developed f in NAD, VSS, NC/AT, MMM, anicteric sclera, neck supple/fROM, RR, lungs CTAB, abd soft, + ttp to deep palpation over LUQ/epigastric region, no ttp over ribs/flank, no ecchymosis/crepitus, +BS, -r/g, no peritoneal signs, - edema, - calf ttp, +2 distal pulses and capillary refill < 2 sec, AAO   Records from care everywhere show that the pt is O+ (2015)  A/p abd pain of pregnancy, check labs, u/a, urine pregn, u/s, offered antiemetic and non-narcotic pain control, re-eval and treat accordingly  Critical Care Time  Procedures

## 2019-09-01 NOTE — ED PROVIDER NOTES
History  Chief Complaint   Patient presents with    Abdominal Pain Pregnant     took at home pregnancy test yester, +, this morning was woken up by sharp pressure in ULQ      35 yo  presenting for LUQ abdominal pain that began this morning upon awakening at around 5:45 AM after having a positive pregnancy test yesterday  LMP was 19, estimated to be at 3 weeks 4 days  Pain is sharp in nature, gets worse when she is laying down in bed, better when she is up and walking  No recent trauma, new exercise routines, or recent exertion that she can point to  Has some mild nausea but has not vomited, no diarrhea, constipation, dysuria, hematuria  Pain is not burning in nature, no burning in the back of her throat or other "heartburn" symptoms  When the pain was at its worse she endorses some diaphoresis, pain is non-exertional and is actually better when she is walking around  Denies vaginal bleeding, vaginal discharge  pelvic pain  Has never experienced this before  ROS is otherwise negative as below  History provided by:  Patient   used: No    Abdominal Pain   Pain location:  LUQ  Pain quality: aching and sharp    Pain radiates to:  Does not radiate  Pain severity:  Mild  Onset quality:  Sudden  Duration:  2 hours  Timing:  Constant  Progression:  Unchanged  Chronicity:  New  Context: awakening from sleep (thinks it may have awakened her from sleep, noticed it immediately upon waking)    Context: not eating, not recent illness, not recent sexual activity, not recent travel, not suspicious food intake and not trauma    Relieved by: movement, walking    Worsened by:  Deep breathing (laying down still)  Associated symptoms: nausea (mild nausea earlier, now resolved)    Associated symptoms: no chest pain, no chills, no constipation, no cough, no diarrhea, no dysuria, no fatigue, no fever, no hematuria, no shortness of breath, no sore throat, no vaginal bleeding, no vaginal discharge and no vomiting    Risk factors: pregnancy    Risk factors: no alcohol abuse        Prior to Admission Medications   Prescriptions Last Dose Informant Patient Reported? Taking? Prenatal MV-Min-Fe Fum-FA-DHA (PRENATAL+DHA PO) 8/31/2019 at Unknown time Self Yes Yes   Sig: Take by mouth      Facility-Administered Medications: None       History reviewed  No pertinent past medical history  Past Surgical History:   Procedure Laterality Date    ADENOIDECTOMY      TONSILLECTOMY  2013    WISDOM TOOTH EXTRACTION         Family History   Problem Relation Age of Onset    Lymphoma Father     Hypertension Father     Osteoporosis Mother     Hypertension Mother     Breast cancer Maternal Grandmother     Heart disease Paternal Grandmother      I have reviewed and agree with the history as documented  Social History     Tobacco Use    Smoking status: Never Smoker    Smokeless tobacco: Never Used   Substance Use Topics    Alcohol use: Yes     Comment: socially    Drug use: No        Review of Systems   Constitutional: Positive for diaphoresis (with onset)  Negative for chills, fatigue and fever  HENT: Negative for congestion, rhinorrhea and sore throat  Eyes: Negative for redness and visual disturbance  Respiratory: Negative for cough, shortness of breath and wheezing  Cardiovascular: Negative for chest pain, palpitations and leg swelling  Gastrointestinal: Positive for abdominal pain (LUQ) and nausea (mild nausea earlier, now resolved)  Negative for blood in stool, constipation, diarrhea and vomiting  Genitourinary: Negative for difficulty urinating, dysuria, flank pain, frequency, hematuria, pelvic pain, urgency, vaginal bleeding, vaginal discharge and vaginal pain  Musculoskeletal: Negative for back pain and myalgias  Skin: Negative for pallor and rash  Neurological: Negative for dizziness, syncope, weakness, light-headedness, numbness and headaches     Hematological: Does not bruise/bleed easily  Psychiatric/Behavioral: Negative for confusion  The patient is nervous/anxious  Physical Exam  ED Triage Vitals   Temperature Pulse Respirations Blood Pressure SpO2   09/01/19 0648 09/01/19 0648 09/01/19 0648 09/01/19 0648 09/01/19 0648   98 6 °F (37 °C) (!) 110 18 127/83 100 %      Temp Source Heart Rate Source Patient Position - Orthostatic VS BP Location FiO2 (%)   09/01/19 0648 09/01/19 0812 09/01/19 0812 09/01/19 0812 --   Oral Monitor Lying Right arm       Pain Score       09/01/19 0648       6             Orthostatic Vital Signs  Vitals:    09/01/19 0717 09/01/19 0812 09/01/19 1130 09/01/19 1300   BP:  103/66 116/65 120/68   Pulse: 89 (!) 107 96 92   Patient Position - Orthostatic VS:  Lying Lying Lying       Physical Exam   Constitutional: She appears well-developed and well-nourished  No distress  HENT:   Head: Normocephalic and atraumatic  Mouth/Throat: Oropharynx is clear and moist  No oropharyngeal exudate  Eyes: Pupils are equal, round, and reactive to light  Conjunctivae are normal  Right eye exhibits no discharge  Left eye exhibits no discharge  Neck: Normal range of motion  Cardiovascular: Normal rate, regular rhythm and normal heart sounds  No murmur heard  Mildly tachycardic to low 100's intermittently  Pulmonary/Chest: Effort normal and breath sounds normal  No respiratory distress  She has no wheezes  She has no rales  She exhibits no tenderness  Abdominal: Soft  Bowel sounds are normal  She exhibits no distension and no mass  There is tenderness (LUQ)  There is no rebound and no guarding  Musculoskeletal: Normal range of motion  She exhibits no edema  Neurological: She is alert  No cranial nerve deficit  5/5 strength in upper and lower extremities throughout all distributions  Ambulates normally  Skin: Skin is warm and dry  No rash noted  She is not diaphoretic  No pallor  Psychiatric: She has a normal mood and affect     Nursing note and vitals reviewed        ED Medications  Medications   sodium chloride 0 9 % bolus 1,000 mL (0 mL Intravenous Stopped 9/1/19 0921)   acetaminophen (TYLENOL) tablet 650 mg (650 mg Oral Given 9/1/19 0811)       Diagnostic Studies  Results Reviewed     Procedure Component Value Units Date/Time    Urine culture [[id number]]  (Abnormal) Collected:  09/01/19 0749    Lab Status:  Final result Specimen:  Urine, Other Updated:  09/03/19 1457     Urine Culture <10,000 cfu/ml Beta Hemolytic Streptococcus Group B      60,000-69,000 cfu/ml     Troponin I [[id number]]  (Normal) Collected:  09/01/19 1232    Lab Status:  Final result Specimen:  Blood from Arm, Left Updated:  09/01/19 1319     Troponin I <0 02 ng/mL     Troponin I [[id number]]  (Normal) Collected:  09/01/19 0922    Lab Status:  Final result Specimen:  Blood from Arm, Left Updated:  09/01/19 0956     Troponin I <0 02 ng/mL     D-dimer, quantitative [[id number]]  (Normal) Collected:  09/01/19 0922    Lab Status:  Final result Specimen:  Blood from Arm, Left Updated:  09/01/19 0950     D-Dimer, Quant 333 ng/ml (FEU)     hCG, quantitative [[id number]]  (Abnormal) Collected:  09/01/19 0744    Lab Status:  Final result Specimen:  Blood from Arm, Left Updated:  09/01/19 0818     HCG, Quant 182 mIU/mL     Narrative:        Expected Ranges:     Approximate               Approximate HCG  Gestation age          Concentration ( mIU/mL)  _____________          ______________________   Cephus Dykes                      HCG values  0 2-1                       5-50  1-2                           2-3                         100-5000  3-4                         500-09073  4-5                         1000-46205  5-6                         [id number]  6-8                         35924-689222  8-12                        [id number]      POCT urinalysis dipstick [[id number]]  (Normal) Resulted:  09/01/19 0802    Lab Status:  Final result Specimen:  Urine Updated:  09/01/19 0816     Color, UA done POCT pregnancy, urine [[id number]]  (Normal) Resulted:  09/01/19 0815    Lab Status:  Final result Updated:  09/01/19 0815     EXT PREG TEST UR (Ref: Negative) Negative     Control Valid    Comprehensive metabolic panel [[id number]] Collected:  09/01/19 0744    Lab Status:  Final result Specimen:  Blood from Arm, Left Updated:  09/01/19 0810     Sodium 139 mmol/L      Potassium 3 6 mmol/L      Chloride 106 mmol/L      CO2 24 mmol/L      ANION GAP 9 mmol/L      BUN 12 mg/dL      Creatinine 0 74 mg/dL      Glucose 92 mg/dL      Calcium 9 2 mg/dL      AST 10 U/L      ALT 21 U/L      Alkaline Phosphatase 56 U/L      Total Protein 8 0 g/dL      Albumin 4 6 g/dL      Total Bilirubin 0 62 mg/dL      eGFR 111 ml/min/1 73sq m     Narrative:       [place] guidelines for Chronic Kidney Disease (CKD):     Stage 1 with normal or high GFR (GFR > 90 mL/min/1 73 square meters)    Stage 2 Mild CKD (GFR = 60-89 mL/min/1 73 square meters)    Stage 3A Moderate CKD (GFR = 45-59 mL/min/1 73 square meters)    Stage 3B Moderate CKD (GFR = 30-44 mL/min/1 73 square meters)    Stage 4 Severe CKD (GFR = 15-29 mL/min/1 73 square meters)    Stage 5 End Stage CKD (GFR <15 mL/min/1 73 square meters)  Note: GFR calculation is accurate only with a steady state creatinine    Lipase [[id number]]  (Normal) Collected:  09/01/19 0744    Lab Status:  Final result Specimen:  Blood from Arm, Left Updated:  09/01/19 0810     Lipase 127 u/L     Urine Microscopic [[id number]]  (Abnormal) Collected:  09/01/19 0749    Lab Status:  Final result Specimen:  Urine, Other Updated:  09/01/19 0802     RBC, UA 2-4 /hpf      WBC, UA None Seen /hpf      Epithelial Cells None Seen /hpf      Bacteria, UA Occasional /hpf      Hyaline Casts, UA None Seen /lpf     CBC and differential [[id number]]  (Abnormal) Collected:  09/01/19 0744    Lab Status:  Final result Specimen:  Blood from Arm, Left Updated:  09/01/19 0753     WBC 7 33 Thousand/uL RBC 5 14 Million/uL      Hemoglobin 14 8 g/dL      Hematocrit 44 2 %      MCV 86 fL      MCH 28 8 pg      MCHC 33 5 g/dL      RDW 13 3 %      MPV 10 0 fL      Platelets 920 Thousands/uL      nRBC 0 /100 WBCs      Neutrophils Relative 55 %      Immat GRANS % 0 %      Lymphocytes Relative 33 %      Monocytes Relative 8 %      Eosinophils Relative 4 %      Basophils Relative 0 %      Neutrophils Absolute 3 92 Thousands/µL      Immature Grans Absolute 0 01 Thousand/uL      Lymphocytes Absolute 2 44 Thousands/µL      Monocytes Absolute 0 61 Thousand/µL      Eosinophils Absolute 0 32 Thousand/µL      Basophils Absolute 0 03 Thousands/µL     ED Urine Macroscopic [[id number]]  (Abnormal) Collected:  19    Lab Status:  Final result Specimen:  Urine Updated:  19     Color, UA Yellow     Clarity, UA Clear     pH, UA 5 5     Leukocytes, UA Trace     Nitrite, UA Negative     Protein, UA Negative mg/dl      Glucose, UA Negative mg/dl      Ketones, UA Negative mg/dl      Urobilinogen, UA 0 2 E U /dl      Bilirubin, UA Negative     Blood, UA Negative     Specific Gravity, UA 1 010    Narrative:       CLINITEK RESULT                 US abdomen complete   Final Result by Lissette Song MD ( 1149)   Normal                      Workstation performed: [id number]         US OB pregnancy limited with transvaginal   Final Result by Interface, Radiology Results In ( 1151)   No intrauterine gestation or adnexal mass identified  Differential remains early IUP, spontaneous  and ectopic pregnancy  Correlate with serial quantitative BHCG              Workstation performed: [id number]               Procedures  ECG 12 Lead Documentation Only  Date/Time: 2019 8:48 AM  Performed by: Mercedes Anderson MD  Authorized by: Mercedes Anderson MD     Indications / Diagnosis:  Abdominal pain  ECG reviewed by me, the ED Provider: yes    Patient location:  ED  Previous ECG:     Previous ECG: Unavailable  Interpretation:     Interpretation: non-specific    Rate:     ECG rate:  86    ECG rate assessment: normal    Rhythm:     Rhythm: sinus rhythm    Ectopy:     Ectopy: none    QRS:     QRS axis:  Normal  T waves:     T waves: flattening      Flattening:  V3 and III  Comments:      Normal sinus with nonspecific T wave flattening  ECG 12 Lead Documentation Only  Date/Time: 9/1/2019 10:08 AM  Performed by: Mercedes Anderson MD  Authorized by: Mercedes Anderson MD     Indications / Diagnosis:  LUQ pain  ECG reviewed by me, the ED Provider: yes    Patient location:  ED  Previous ECG:     Previous ECG:  Unavailable  Interpretation:     Interpretation: abnormal    Rate:     ECG rate:  95    ECG rate assessment: normal    Rhythm:     Rhythm: sinus rhythm    Ectopy:     Ectopy: none    QRS:     QRS axis:  Normal  Conduction:     Conduction: normal    ST segments:     ST segments:  Normal  T waves:     T waves: inverted    Q waves:     Q waves:  III, aVF, V3, V4, V5 and V6  Comments:      Normal sinus with new TWI in inferolateral leads            ED Course  ED Course as of Sep 04 0902   Sun Sep 01, 2019   1007 Will obtain delta trop      1133 Discussed getting CXR to rule out LLL PNA or rib fractures as cause of pain, patient refused due to possible risks of radiation exposure to fetus  Described that these risks are low and we take every precaution to limit them  Patient understanding and would still like to hold off       1325 Will discuss ECG findings with cardiology  [phone] with on-call cardiology fellow Dr Oneida Tijerina about ECG changes, they recommended outpatient echocardiogram and follow-up               HEART Risk Score      Most Recent Value   History  0 Filed at: 09/01/2019 1022   ECG  1 Filed at: 09/01/2019 1022   Age  0 Filed at: 09/01/2019 1022   Risk Factors  0 Filed at: 09/01/2019 1022   Troponin  0 Filed at: 09/01/2019 1022   Heart Score Risk Calculator   History  0 Filed at: 09/01/2019 1022 ECG  1 Filed at: 2019 1022   Age  0 Filed at: 2019 1022   Risk Factors  0 Filed at: 2019 1022   Troponin  0 Filed at: 2019 1022   HEART Score  1 Filed at: 2019 1022   HEART Score  1 Filed at: 2019 1022            PERC Rule for PE      Most Recent Value   PERC Rule for PE   Age >=50  0 Filed at: 2019 0917   HR >=100  1 Filed at: 2019 0917   O2 Sat on room air < 95%  0 Filed at: 2019 4697   History of PE or DVT  0 Filed at: 2019 8402   Recent trauma or surgery  0 Filed at: 2019 0917   Hemoptysis  0 Filed at: 2019 8716   Exogenous estrogen  0 Filed at: 2019 2806   Unilateral leg swelling  0 Filed at: 2019 6492   PERC Rule for PE Results  1 Filed at: 2019 6070                Wells' Criteria for PE      Most Recent Value   Wells' Criteria for PE   Clinical signs and symptoms of DVT  0 Filed at: 2019 1017   PE is primary diagnosis or equally likely  0 Filed at: 2019 1017   HR >100  1 5 Filed at: 2019 1017   Immobilization at least 3 days or Surgery in the previous 4 weeks  0 Filed at: 2019 1017   Previous, objectively diagnosed PE or DVT  0 Filed at: 2019 1017   Hemoptysis  0 Filed at: 2019 1017   Malignancy with treatment within 6 months or palliative  0 Filed at: 2019 1017   Wells' Criteria Total  1 5 Filed at: 2019 1017            MDM  Number of Diagnoses or Management Options  Abdominal pain during pregnancy in first trimester: new and requires workup  ECG abnormal: new and requires workup  Diagnosis management comments: 33 yo  presenting after home pregnancy test yesterday witih LMP 19, EGG of 3w5d witih LUQ sharp pain that began this morning upon awakening, associated with diaphoresis at onset   Unclear what is causing LUQ pain at this time, possibly musculoskeletal however did discuss with cardiology as patient has some nonspecific findings on ECG with inferior T wave inversions  Troponin negative x2  HEART score is 1, Well's 1 5, cannot PERC due to tachycardia  Will obtain d-dimer to r/o PE as cause of this LUQ pain with diaphoresis this AM    -Patient declined CXR, explained minimal risk of radiation exposure to fetus however she was still not comfortable  Wanted to obtain to r/o rib fracture or LLL PNA as cause, however both are unlikely at this time   -Abdominal US unremarkable  -Transvaginal pelvic US does not show IUP, shows thickened endometrium and some ovarian cysts with a cystic region near the uterus likely to be cul de sac fluid  Consistent with early intrauterine pregnancy versus spontaneous    today  Will get repeat in 3 days, ordered and patient given prescription with instructions    -Discussion of cardiology for new TWI with negative trop and LUQ pain associated with diaphoresis  Suggested inpatient echocardiogram  Patient declined to stay for echocardiogram and felt more comfortable getting outpatient and following up with cardiology  For this reason, signed out AMA  Discussed risks of this action including risk to pregnancy and patient, or serious morbidity  Demonstrated understanding and capacity to make this decision  Patient given thorough instructions on follow-up to minimize risk    -Discharged AMA pain free, in good condition with return precautions discussed verbally and written in discharge paperwork          Amount and/or Complexity of Data Reviewed  Clinical lab tests: ordered and reviewed  Tests in the radiology section of CPT®: ordered and reviewed  Tests in the medicine section of CPT®: ordered and reviewed  Discussion of test results with the performing providers: yes  Decide to obtain previous medical records or to obtain history from someone other than the patient: yes  Obtain history from someone other than the patient: yes  Review and summarize past medical records: yes  Discuss the patient with other providers: yes  Independent visualization of images, tracings, or specimens: yes    Risk of Complications, Morbidity, and/or Mortality  Presenting problems: low  Diagnostic procedures: minimal  Management options: minimal    Patient Progress  Patient progress: improved      Disposition  Final diagnoses:   Abdominal pain during pregnancy in first trimester   ECG abnormal     Time reflects when diagnosis was documented in both MDM as applicable and the Disposition within this note     Time User Action Codes Description Comment    9/1/2019  1:09 PM Daren Kinney Add [O26 891,  R10 9] Abdominal pain during pregnancy in first trimester     9/1/2019  1:41 PM Daren Kinney Add [R94 31] ECG abnormal       ED Disposition     ED Disposition Condition Date/Time Comment    [facility] Sep 1, 2019  1:36 PM Date: 9/1/2019  Patient: Kathyleen Rubinstein  Admitted: 9/1/2019  6:45 AM  Attending Provider: Ivar Brunner, MD    Kathyleen Rubinstein or her authorized caregiver has made the decision for the patient to leave the emergency department against the advice of  the emergency department staff  She or her authorized caregiver has been informed and understands the inherent risks, including death, heart attack, loss of consciousness, loss of pregnancy  She or her authorized caregiver has decided to accept the  responsibility for this decision  Kathyleen Rubinstein and all necessary parties have been advised that she may return for further evaluation or treatment  Her condition at time of discharge was stable  Kathyleen Rubinstein had current vital signs as follows:   B P 120/68 (BP Location: Right arm)   Pulse 92   Temp 98 6 °F (37 °C) (Oral)   Resp 21   Wt 65 1 kg (143 lb 8 3 oz)   LMP 08/07/2019         Follow-up Information     Follow up With Specialties Details Why Contact Info Additional Information    Jose De Jesus Anton MD Internal Medicine Schedule an appointment as soon as possible for a visit in 2 days  [address] [facility] [address] [facility] [address] Tyrell Ramos La Nena Muro MD Obstetrics and Gynecology, Obstetrics, Gynecology Schedule an appointment as soon as possible for a visit in 2 days  [address] [facility]  [address] [address] Cardiology Schedule an appointment as soon as possible for a visit in 1 day For reevaluation as we discussed  [address] [facility] [address], [facility] [address], [place], [place], [address]          Discharge Medication List as of 9/1/2019  1:43 PM      CONTINUE these medications which have NOT CHANGED    Details   Prenatal MV-Min-Fe Fum-FA-DHA (PRENATAL+DHA PO) Take by mouth, Historical Med           Outpatient Discharge Orders   hCG, quantitative   Standing Status: Future Standing Exp  Date: 09/01/20     Echo complete with contrast if indicated   Standing Status: Future Standing Exp  Date: 09/01/23       ED Provider  Attending physically available and evaluated Primus Ohm  I managed the patient along with the ED Attending      Electronically Signed by         Radha Yip MD  09/04/19 0084

## 2019-09-01 NOTE — DISCHARGE INSTRUCTIONS
Today you were seen for left upper quadrant abdominal pain  You also had new changes in your ECG, we discussed this with cardiology and they recommended an echocardiogram  You did not want to receive this test here, so we were required to sign you out against medical advice  We are ordering you an outpatient echocardiogram and follow up with our cardiologists  Please do this as soon as you can  In addition, have your quantitative HCG redrawn in 3 days and follow-up with your ob/gyn for these symtpoms and to make sure your level is rising normally  If you experience any chest pain, shortness of breath, loss of consciousness, severe abdominal pain, increased fatigue, fevers, chills, vaginal bleeding, or any other symptoms that concern you please return immediately to the emergency department  You may take acetaminophen as needed for your abdominal pain

## 2019-09-01 NOTE — ED NOTES
Patient transported to [address] [place] [address] 639, [address]  09/01/19 7609

## 2019-09-01 NOTE — ED RE-EVALUATION NOTE
Pt currently pain free  Feels well  Aware of tests that we are awaiting       Mullins MD [place]  09/04/19 0082

## 2019-09-02 LAB
ATRIAL RATE: 92 BPM
P AXIS: 74 DEGREES
PR INTERVAL: 120 MS
QRS AXIS: 78 DEGREES
QRSD INTERVAL: 72 MS
QT INTERVAL: 324 MS
QTC INTERVAL: 400 MS
T WAVE AXIS: -70 DEGREES
VENTRICULAR RATE: 92 BPM

## 2019-09-02 PROCEDURE — 93010 ELECTROCARDIOGRAM REPORT: CPT | Performed by: INTERNAL MEDICINE

## 2019-09-03 LAB
BACTERIA UR CULT: ABNORMAL
BACTERIA UR CULT: ABNORMAL

## 2019-09-04 ENCOUNTER — TELEPHONE (OUTPATIENT)
Dept: OBGYN CLINIC | Facility: CLINIC | Age: 29
End: 2019-09-04

## 2019-09-04 DIAGNOSIS — O03.9 MISCARRIAGE: Primary | ICD-10-CM

## 2019-09-04 NOTE — TELEPHONE ENCOUNTER
Patient called - she had a positive pregnancy test on Saturday  She woke up on Sunday morning due to ULQ pain  No bleeding just the pain - went to the ER  They did an OB U/S with transvaginal and an U/S abdominal - both showed no gestational sack  Did an HCG - 152 but a pregnancy test by urine dip came back negative  She is going for another HCG tomorrow  Today she started to bleed  Its very light, no clots  Only see's when wiping  No blood in the toilet  No recent intercourse  Patient is worried  Please advise  Thanks!

## 2019-09-05 ENCOUNTER — HOSPITAL ENCOUNTER (OUTPATIENT)
Dept: NON INVASIVE DIAGNOSTICS | Facility: CLINIC | Age: 29
Discharge: HOME | End: 2019-09-05
Payer: COMMERCIAL

## 2019-09-05 ENCOUNTER — LAB (OUTPATIENT)
Dept: LAB | Facility: CLINIC | Age: 29
End: 2019-09-05
Payer: COMMERCIAL

## 2019-09-05 ENCOUNTER — OFFICE VISIT (OUTPATIENT)
Dept: CARDIOLOGY CLINIC | Facility: CLINIC | Age: 29
End: 2019-09-05
Payer: COMMERCIAL

## 2019-09-05 VITALS
DIASTOLIC BLOOD PRESSURE: 80 MMHG | HEIGHT: 62 IN | WEIGHT: 139.8 LBS | RESPIRATION RATE: 18 BRPM | BODY MASS INDEX: 25.73 KG/M2 | TEMPERATURE: 97.1 F | HEART RATE: 75 BPM | SYSTOLIC BLOOD PRESSURE: 130 MMHG

## 2019-09-05 DIAGNOSIS — R94.31 ECG ABNORMAL: ICD-10-CM

## 2019-09-05 DIAGNOSIS — R94.31 ABNORMAL EKG: Primary | ICD-10-CM

## 2019-09-05 DIAGNOSIS — R10.9 ABDOMINAL PAIN DURING PREGNANCY IN FIRST TRIMESTER: ICD-10-CM

## 2019-09-05 DIAGNOSIS — O26.891 ABDOMINAL PAIN DURING PREGNANCY IN FIRST TRIMESTER: ICD-10-CM

## 2019-09-05 LAB — B-HCG SERPL-ACNC: 19 MIU/ML

## 2019-09-05 PROCEDURE — 99204 OFFICE O/P NEW MOD 45 MIN: CPT | Performed by: INTERNAL MEDICINE

## 2019-09-05 PROCEDURE — 36415 COLL VENOUS BLD VENIPUNCTURE: CPT

## 2019-09-05 PROCEDURE — 93306 TTE W/DOPPLER COMPLETE: CPT | Performed by: INTERNAL MEDICINE

## 2019-09-05 PROCEDURE — 84702 CHORIONIC GONADOTROPIN TEST: CPT

## 2019-09-05 PROCEDURE — 93306 TTE W/DOPPLER COMPLETE: CPT

## 2019-09-05 NOTE — PROGRESS NOTES
Assessment/Plan:    Abnormal EKG  Patient has an abnormal EKG on the basis of minor nonspecific ST and T-wave changes  Otherwise she is totally asymptomatic with no symptoms of chest pain or shortness of breath  No symptoms of palpitation  Her echocardiogram was unremarkable  I would like to arrange for the patient to have a regular exercise stress test        Diagnoses and all orders for this visit:    Abnormal EKG          Subjective:  Feels well  Patient ID: Kathyleen Rubinstein is a 29 y o  female  The patient presented to this office for the purpose of cardiac evaluation and consultation  Proximally for days ago the patient was in the emergency room because of complaints of left upper quadrant abdominal pain  She was noted on EKG to have minor nonspecific ST and T-wave changes  Several EKGs were obtained showing minor changes from time to time  At that time the patient was still actively having abdominal pain  She never experienced any chest pain  She denies any symptoms of shortness of breath or palpitation  She denies any symptoms of dizziness or lightheadedness  She has no history of leg edema  She denies any history of rheumatic fever or heart murmur  She denies a history of hypertension although she was noted to have elevated blood pressure during her pregnancy approximately 4 years ago  The patient had an echocardiogram today which was essentially unremarkable the ejection fraction was in the vicinity of 60%  There was trace mitral regurgitation otherwise the echocardiogram was unremarkable  The following portions of the patient's history were reviewed and updated as appropriate: allergies, current medications, past family history, past medical history, past social history, past surgical history and problem list     Review of Systems   Respiratory: Negative for apnea, cough, chest tightness, shortness of breath and wheezing      Cardiovascular: Negative for chest pain, palpitations and leg swelling  Gastrointestinal: Negative for abdominal pain  Neurological: Negative for dizziness and light-headedness  Objective:  Stable cardiac-wise  /80 (BP Location: Right arm, Patient Position: Sitting)   Pulse 75   Temp (!) 97 1 °F (36 2 °C)   Resp 18   Ht 5' 1 75" (1 568 m)   Wt 63 4 kg (139 lb 12 8 oz)   LMP 08/07/2019   BMI 25 78 kg/m²          Physical Exam   Constitutional: She is oriented to person, place, and time  She appears well-developed and well-nourished  No distress  HENT:   Head: Normocephalic and atraumatic  Neck: Normal range of motion  Neck supple  No JVD present  No thyromegaly present  Cardiovascular: Normal rate, regular rhythm, S1 normal and S2 normal  Exam reveals no gallop and no friction rub  No murmur heard  Pulmonary/Chest: Effort normal  No respiratory distress  She has no wheezes  She has no rales  She exhibits no tenderness  Abdominal: Soft  Musculoskeletal: She exhibits no edema  Neurological: She is alert and oriented to person, place, and time  Skin: Skin is warm and dry  She is not diaphoretic  Psychiatric: She has a normal mood and affect  Vitals reviewed

## 2019-09-05 NOTE — TELEPHONE ENCOUNTER
Pt bleeding a bit more heavily - almost like a period  She is going for an "echo" as they saw something on ekg  She is also having a hcg done today  I gave her ectopic precautions

## 2019-09-05 NOTE — LETTER
September 5, 2019     Referral [address] [place] [address]    Patient: Kathyleen Rubinstein   YOB: 1990   Date of Visit: 9/5/2019       Dear Dr Kaila Paredes:    Thank you for referring Priscilla Stauffer to me for evaluation  Below are my notes for this consultation  If you have questions, please do not hesitate to call me  I look forward to following your patient along with you  Sincerely,        Patti Gonzales MD        CC: MD Mónica Blair MD Viva Rather, MD  9/5/2019  3:45 PM  Incomplete  Assessment/Plan:    Abnormal EKG  Patient has an abnormal EKG on the basis of minor nonspecific ST and T-wave changes  Otherwise she is totally asymptomatic with no symptoms of chest pain or shortness of breath  No symptoms of palpitation  Her echocardiogram was unremarkable  I would like to arrange for the patient to have a regular exercise stress test        Diagnoses and all orders for this visit:    Abnormal EKG          Subjective:  Feels well  Patient ID: Kathyleen Rubinstein is a 29 y o  female  The patient presented to this office for the purpose of cardiac evaluation and consultation  Proximally for days ago the patient was in the emergency room because of complaints of left upper quadrant abdominal pain  She was noted on EKG to have minor nonspecific ST and T-wave changes  Several EKGs were obtained showing minor changes from time to time  At that time the patient was still actively having abdominal pain  She never experienced any chest pain  She denies any symptoms of shortness of breath or palpitation  She denies any symptoms of dizziness or lightheadedness  She has no history of leg edema  She denies any history of rheumatic fever or heart murmur  She denies a history of hypertension although she was noted to have elevated blood pressure during her pregnancy approximately 4 years ago    The patient had an echocardiogram today which was essentially unremarkable the ejection fraction was in the vicinity of 60%  There was trace mitral regurgitation otherwise the echocardiogram was unremarkable  The following portions of the patient's history were reviewed and updated as appropriate: allergies, current medications, past family history, past medical history, past social history, past surgical history and problem list     Review of Systems   Respiratory: Negative for apnea, cough, chest tightness, shortness of breath and wheezing  Cardiovascular: Negative for chest pain, palpitations and leg swelling  Gastrointestinal: Negative for abdominal pain  Neurological: Negative for dizziness and light-headedness  Objective:  Stable cardiac-wise  /80 (BP Location: Right arm, Patient Position: Sitting)   Pulse 75   Temp (!) 97 1 °F (36 2 °C)   Resp 18   Ht 5' 1 75" (1 568 m)   Wt 63 4 kg (139 lb 12 8 oz)   LMP 08/07/2019   BMI 25 78 kg/m²           Physical Exam   Constitutional: She is oriented to person, place, and time  She appears well-developed and well-nourished  No distress  HENT:   Head: Normocephalic and atraumatic  Neck: Normal range of motion  Neck supple  No JVD present  No thyromegaly present  Cardiovascular: Normal rate, regular rhythm, S1 normal and S2 normal  Exam reveals no gallop and no friction rub  No murmur heard  Pulmonary/Chest: Effort normal  No respiratory distress  She has no wheezes  She has no rales  She exhibits no tenderness  Abdominal: Soft  Musculoskeletal: She exhibits no edema  Neurological: She is alert and oriented to person, place, and time  Skin: Skin is warm and dry  She is not diaphoretic  Psychiatric: She has a normal mood and affect  Vitals reviewed

## 2019-09-05 NOTE — ASSESSMENT & PLAN NOTE
Patient has an abnormal EKG on the basis of minor nonspecific ST and T-wave changes  Otherwise she is totally asymptomatic with no symptoms of chest pain or shortness of breath  No symptoms of palpitation  Her echocardiogram was unremarkable    I would like to arrange for the patient to have a regular exercise stress test

## 2019-09-05 NOTE — TELEPHONE ENCOUNTER
We will just have to follow the trend of her quant - to see the direction  Would just give normal bleeding and ectopic precautions

## 2019-09-05 NOTE — PATIENT INSTRUCTIONS
The patient will be scheduled for a regular exercise stress test   No additional testing is needed at this time

## 2019-09-09 NOTE — TELEPHONE ENCOUNTER
Sri Carpenter is decreasing - consistent with a miscarriage  Would have her repeat a quant, as we should follow it to negative    Thanks

## 2019-09-11 ENCOUNTER — APPOINTMENT (OUTPATIENT)
Dept: LAB | Facility: HOSPITAL | Age: 29
End: 2019-09-11
Attending: OBSTETRICS & GYNECOLOGY
Payer: COMMERCIAL

## 2019-09-11 DIAGNOSIS — O03.9 MISCARRIAGE: ICD-10-CM

## 2019-09-11 LAB — B-HCG SERPL-ACNC: 2 MIU/ML

## 2019-09-11 PROCEDURE — 84702 CHORIONIC GONADOTROPIN TEST: CPT

## 2019-09-11 PROCEDURE — 36415 COLL VENOUS BLD VENIPUNCTURE: CPT

## 2019-09-12 ENCOUNTER — OFFICE VISIT (OUTPATIENT)
Dept: OBGYN CLINIC | Facility: CLINIC | Age: 29
End: 2019-09-12
Payer: COMMERCIAL

## 2019-09-12 VITALS — BODY MASS INDEX: 25.81 KG/M2 | SYSTOLIC BLOOD PRESSURE: 120 MMHG | DIASTOLIC BLOOD PRESSURE: 74 MMHG | WEIGHT: 140 LBS

## 2019-09-12 DIAGNOSIS — O02.81 CHEMICAL PREGNANCY: Primary | ICD-10-CM

## 2019-09-12 PROCEDURE — 99212 OFFICE O/P EST SF 10 MIN: CPT | Performed by: OBSTETRICS & GYNECOLOGY

## 2019-09-12 NOTE — PROGRESS NOTES
Lukas Holland  1990    S:  29 y o  female here to discuss her recent biochemical pregnancy  She and her  are trying to conceive  She had a quant that as high as 182 but now has dropped to less than 2  She had a seemingly normal bleed that was like a period of several days later than normal   During this time she has also had an abnormal EKG, she has had an echo and will be going for a stress test and this month  She is overall feeling well  She would like to try to conceive again soon  No past medical history on file  Past Surgical History:   Procedure Laterality Date    ADENOIDECTOMY      TONSILLECTOMY  2013    WISDOM TOOTH EXTRACTION         O:  /74 (BP Location: Left arm, Patient Position: Sitting, Cuff Size: Standard)   Wt 63 5 kg (140 lb)   LMP 08/07/2019   BMI 25 81 kg/m²   She appears well and is in no distress    A/P:  Biochemical pregnancy, completed  Discussed that this is common  Reassurance provided on chances of success for a normal pregnancy  Advised waiting 1 normal cycle and then trying to conceive again  Continue prenatal vitamins

## 2019-09-26 ENCOUNTER — HOSPITAL ENCOUNTER (OUTPATIENT)
Dept: NON INVASIVE DIAGNOSTICS | Facility: CLINIC | Age: 29
Discharge: HOME | End: 2019-09-26
Payer: COMMERCIAL

## 2019-09-26 DIAGNOSIS — R94.31 ABNORMAL EKG: ICD-10-CM

## 2019-09-26 PROCEDURE — 93018 CV STRESS TEST I&R ONLY: CPT | Performed by: INTERNAL MEDICINE

## 2019-09-26 PROCEDURE — 93017 CV STRESS TEST TRACING ONLY: CPT

## 2019-09-26 PROCEDURE — 93016 CV STRESS TEST SUPVJ ONLY: CPT | Performed by: INTERNAL MEDICINE

## 2019-12-04 ENCOUNTER — OFFICE VISIT (OUTPATIENT)
Dept: OBGYN CLINIC | Facility: CLINIC | Age: 29
End: 2019-12-04
Payer: COMMERCIAL

## 2019-12-04 VITALS — BODY MASS INDEX: 27.58 KG/M2 | DIASTOLIC BLOOD PRESSURE: 82 MMHG | WEIGHT: 149.6 LBS | SYSTOLIC BLOOD PRESSURE: 126 MMHG

## 2019-12-04 DIAGNOSIS — N91.2 AMENORRHEA: Primary | ICD-10-CM

## 2019-12-04 PROCEDURE — 99214 OFFICE O/P EST MOD 30 MIN: CPT | Performed by: OBSTETRICS & GYNECOLOGY

## 2019-12-05 PROCEDURE — 76817 TRANSVAGINAL US OBSTETRIC: CPT | Performed by: OBSTETRICS & GYNECOLOGY

## 2019-12-13 ENCOUNTER — INITIAL PRENATAL (OUTPATIENT)
Dept: OBGYN CLINIC | Facility: CLINIC | Age: 29
End: 2019-12-13

## 2019-12-13 VITALS — WEIGHT: 148.8 LBS | BODY MASS INDEX: 27.38 KG/M2 | HEIGHT: 62 IN

## 2019-12-13 DIAGNOSIS — Z34.81 PRENATAL CARE, SUBSEQUENT PREGNANCY, FIRST TRIMESTER: Primary | ICD-10-CM

## 2019-12-13 PROCEDURE — OBC: Performed by: OBSTETRICS & GYNECOLOGY

## 2019-12-13 NOTE — PROGRESS NOTES
OB INTAKE INTERVIEW  * Pt presents for OB intake  * Accompanied by: self  *[id number]  *Hx of  delivery prior to 36 weeks 6 days no  *Last Menstrual Period: Pt's LMP was 10/3/19  *Ultrasound date:19   8weeks 6days  *Estimated date of delivery: 20   * confirmed by US    *Signs/Symptoms of Pregnancy      *ptoms}constipation no   *headaches no   *cramping/spotting no   *PICA cravings no  *Diabetes- if you answer yes, please order 1 hour GTT, 50g   *hx of GDM no   *BMI >35 no   *first degree relative with type 2 diabetes yes   *hx of PCOS no   *current metformin useno   *prior hx of LGA/macrosomia no   *AMA with other risk factors no  *Hypertension- if you answer yes, please order preeclampsia labs including 24 hour urine protein   *Hx of chronic HTN no   *hx of gestational HTN no   *hx of preeclampsia, eclampsia, or HELLP syndrome no  *Infection Screening-    *does the pt have a hx of MRSA? no   *if yes- please follow MRSA protocol and obtain a nasal swab for MRSA culture   *history of herpes? Yes, x 1   *Immunizations:   *influenza vaccine given today no   *discussed Tdap vaccine    *Interview education   *  [employer] Pregnancy Essentials Book reviewed and discussed   *Handouts given:    *Baby and Me phone abiola guide    *Baby and Me support center    *[facility]     *discussed genetic testing- pt interested no     *appointment at [place] [phone] made no    *Prenatal lab work scripts    *Nurse/Family Partnership- pt may qualify no referral placed no  *I have these concerns about this prenatal patient: no  *Details that I feel the provider should be aware of: no  PN1 visit scheduled  The patient was oriented to our practice and all questions were answered  Pn interview completed  Pt happy with pregnancy  Pn bldwk ordered, including one hour glucola  Lab slips given to pt  Referral entered in epic for seq screen & level 2  20 wks US  Pt to consider seq screen & will call [facility] if decision is made to have testing  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

## 2019-12-20 ENCOUNTER — APPOINTMENT (OUTPATIENT)
Dept: LAB | Facility: HOSPITAL | Age: 29
End: 2019-12-20
Attending: OBSTETRICS & GYNECOLOGY
Payer: COMMERCIAL

## 2019-12-20 DIAGNOSIS — Z34.81 PRENATAL CARE, SUBSEQUENT PREGNANCY, FIRST TRIMESTER: ICD-10-CM

## 2019-12-20 LAB
ABO GROUP BLD: NORMAL
BACTERIA UR QL AUTO: ABNORMAL /HPF
BASOPHILS # BLD AUTO: 0.02 THOUSANDS/ΜL (ref 0–0.1)
BASOPHILS NFR BLD AUTO: 0 % (ref 0–1)
BILIRUB UR QL STRIP: NEGATIVE
BLD GP AB SCN SERPL QL: NEGATIVE
CLARITY UR: ABNORMAL
COLOR UR: YELLOW
EOSINOPHIL # BLD AUTO: 0.19 THOUSAND/ΜL (ref 0–0.61)
EOSINOPHIL NFR BLD AUTO: 2 % (ref 0–6)
ERYTHROCYTE [DISTWIDTH] IN BLOOD BY AUTOMATED COUNT: 13.9 % (ref 11.6–15.1)
GLUCOSE 1H P 50 G GLC PO SERPL-MCNC: 95 MG/DL
GLUCOSE UR STRIP-MCNC: NEGATIVE MG/DL
HBV SURFACE AG SER QL: NORMAL
HCT VFR BLD AUTO: 41.8 % (ref 34.8–46.1)
HGB BLD-MCNC: 13.7 G/DL (ref 11.5–15.4)
HGB UR QL STRIP.AUTO: NEGATIVE
HYALINE CASTS #/AREA URNS LPF: ABNORMAL /LPF
IMM GRANULOCYTES # BLD AUTO: 0.03 THOUSAND/UL (ref 0–0.2)
IMM GRANULOCYTES NFR BLD AUTO: 0 % (ref 0–2)
KETONES UR STRIP-MCNC: ABNORMAL MG/DL
LEUKOCYTE ESTERASE UR QL STRIP: ABNORMAL
LYMPHOCYTES # BLD AUTO: 1.4 THOUSANDS/ΜL (ref 0.6–4.47)
LYMPHOCYTES NFR BLD AUTO: 13 % (ref 14–44)
MCH RBC QN AUTO: 29.1 PG (ref 26.8–34.3)
MCHC RBC AUTO-ENTMCNC: 32.8 G/DL (ref 31.4–37.4)
MCV RBC AUTO: 89 FL (ref 82–98)
MONOCYTES # BLD AUTO: 0.71 THOUSAND/ΜL (ref 0.17–1.22)
MONOCYTES NFR BLD AUTO: 7 % (ref 4–12)
NEUTROPHILS # BLD AUTO: 8.54 THOUSANDS/ΜL (ref 1.85–7.62)
NEUTS SEG NFR BLD AUTO: 78 % (ref 43–75)
NITRITE UR QL STRIP: NEGATIVE
NON-SQ EPI CELLS URNS QL MICRO: ABNORMAL /HPF
NRBC BLD AUTO-RTO: 0 /100 WBCS
PH UR STRIP.AUTO: 6 [PH]
PLATELET # BLD AUTO: 307 THOUSANDS/UL (ref 149–390)
PMV BLD AUTO: 9.7 FL (ref 8.9–12.7)
PROT UR STRIP-MCNC: NEGATIVE MG/DL
RBC # BLD AUTO: 4.71 MILLION/UL (ref 3.81–5.12)
RBC #/AREA URNS AUTO: ABNORMAL /HPF
RH BLD: POSITIVE
RUBV IGG SERPL IA-ACNC: >175 IU/ML
SP GR UR STRIP.AUTO: 1.01 (ref 1–1.03)
UROBILINOGEN UR QL STRIP.AUTO: 0.2 E.U./DL
WBC # BLD AUTO: 10.89 THOUSAND/UL (ref 4.31–10.16)
WBC #/AREA URNS AUTO: ABNORMAL /HPF

## 2019-12-20 PROCEDURE — 36415 COLL VENOUS BLD VENIPUNCTURE: CPT

## 2019-12-20 PROCEDURE — 81001 URINALYSIS AUTO W/SCOPE: CPT

## 2019-12-20 PROCEDURE — 80081 OBSTETRIC PANEL INC HIV TSTG: CPT

## 2019-12-20 PROCEDURE — 87086 URINE CULTURE/COLONY COUNT: CPT

## 2019-12-20 PROCEDURE — 82950 GLUCOSE TEST: CPT

## 2019-12-21 LAB — BACTERIA UR CULT: NORMAL

## 2019-12-22 LAB — HIV 1+2 AB+HIV1 P24 AG SERPL QL IA: NORMAL

## 2019-12-23 LAB — RPR SER QL: NORMAL

## 2019-12-26 ENCOUNTER — TELEPHONE (OUTPATIENT)
Dept: OBGYN CLINIC | Facility: CLINIC | Age: 29
End: 2019-12-26

## 2019-12-27 ENCOUNTER — ROUTINE PRENATAL (OUTPATIENT)
Dept: OBGYN CLINIC | Facility: CLINIC | Age: 29
End: 2019-12-27

## 2019-12-27 VITALS
DIASTOLIC BLOOD PRESSURE: 70 MMHG | HEIGHT: 62 IN | SYSTOLIC BLOOD PRESSURE: 120 MMHG | BODY MASS INDEX: 27.31 KG/M2 | RESPIRATION RATE: 14 BRPM | WEIGHT: 148.4 LBS

## 2019-12-27 DIAGNOSIS — O20.0 THREATENED ABORTION IN FIRST TRIMESTER: Primary | ICD-10-CM

## 2019-12-27 PROCEDURE — PNV: Performed by: OBSTETRICS & GYNECOLOGY

## 2019-12-27 NOTE — PROGRESS NOTES
Assessment/Plan:      There are no diagnoses linked to this encounter  Subjective:     Patient ID: Esme Joya is a 34 y o  female      HPI    Review of Systems      Objective:     Physical Exam

## 2019-12-27 NOTE — PROGRESS NOTES
Patient called with a small amount of bleeding starting on Tuesday night which quickly turned to brown spotting, no further bleeding noted  Review of systems is negative for cramps or heavy vaginal bleeding  Patient has not had recent intercourse  Patient denies a vaginal odor discharge or itching  Patient denies frequency, dysuria, or hematuria  On physical exam there is a very mild yeast infection, no vaginal blood noted,  the cervix appears closed and thick with no cervical abnormalities, no bleeding from the cervix or cervical os  The abdomen is soft nontender  Brief transabdominal ultrasound today shows a viable intrauterine pregnancy with a small separation of the posterior placental edge from the uterus  Pelvic rest was recommended  Patient may use OTC MONOSTAT-7  Rh status is positive

## 2019-12-31 ENCOUNTER — INITIAL PRENATAL (OUTPATIENT)
Dept: OBGYN CLINIC | Facility: CLINIC | Age: 29
End: 2019-12-31

## 2019-12-31 VITALS — BODY MASS INDEX: 27.55 KG/M2 | DIASTOLIC BLOOD PRESSURE: 62 MMHG | SYSTOLIC BLOOD PRESSURE: 112 MMHG | WEIGHT: 150.6 LBS

## 2019-12-31 DIAGNOSIS — Z34.82 PRENATAL CARE, SUBSEQUENT PREGNANCY, SECOND TRIMESTER: Primary | ICD-10-CM

## 2019-12-31 DIAGNOSIS — Z11.3 SCREENING FOR STD (SEXUALLY TRANSMITTED DISEASE): ICD-10-CM

## 2019-12-31 LAB
SL AMB  POCT GLUCOSE, UA: NORMAL
SL AMB POCT URINE PROTEIN: NORMAL

## 2019-12-31 PROCEDURE — PNV: Performed by: OBSTETRICS & GYNECOLOGY

## 2019-12-31 PROCEDURE — 87591 N.GONORRHOEAE DNA AMP PROB: CPT | Performed by: OBSTETRICS & GYNECOLOGY

## 2019-12-31 PROCEDURE — 87491 CHLMYD TRACH DNA AMP PROBE: CPT | Performed by: OBSTETRICS & GYNECOLOGY

## 2019-12-31 NOTE — PROGRESS NOTES
PN1 Visit  Pn1 labs done  Early 1HR GTT= 95 Pap: 5/26/17 neg  Gc-[facility] today off Urine  Had Flu shot at the hospital   Delivery Consent Signed!

## 2019-12-31 NOTE — PROGRESS NOTES
Assessment:     Pregnancy at 12 and 5/7 weeks    History genital HSV    Plan   Initial labs drawn  Prenatal vitamins  Problem list reviewed and updated  SQS offered  Role of ultrasound in pregnancy discussed; fetal survey: ordered  Valtrex @ 36 weeks  Follow up in 4 weeks  Greater than 50% of 30 min visit spent on counseling and coordination of care  Alphonso Solis is being seen today for her first obstetrical visit  This is a planned pregnancy  She is at 12w5d gestation  Relationship with FOB: spouse, living together  Patient does intend to breast feed  Pregnancy history fully reviewed  OB History        3    Para   1    Term   1            AB   1    Living   1       SAB   1    TAB        Ectopic        Multiple        Live Births   1                Patient's last menstrual period was 10/03/2019 (exact date)       Past Medical History:   Diagnosis Date    Herpes     genital herpes x 1  5 yrs ago -no other outbreaks    Migraine     with previous pregnancy    Miscarriage     chemical pregnancy   5 wks    Varicella     childhood chickenpox       Past Surgical History:   Procedure Laterality Date    ADENOIDECTOMY      TONSILLECTOMY  2013    WISDOM TOOTH EXTRACTION           Current Outpatient Medications:     Prenatal MV-Min-Fe Fum-FA-DHA (PRENATAL+DHA PO), Take by mouth, Disp: , Rfl:     Allergies   Allergen Reactions    Sulfa Antibiotics Other (See Comments) and Hives     Rash-as infant       Family History   Problem Relation Age of Onset    Lymphoma Father     Hypertension Father     Osteoporosis Mother     Hypertension Mother     Supraventricular tachycardia Mother     Breast cancer Maternal Grandmother     Hypertension Maternal Grandmother     Diabetes Maternal Grandmother     Diabetes Paternal Grandmother     Obesity Brother     Angina Maternal Grandfather     Diabetes Maternal Grandfather     Cancer Maternal Grandfather     Heart defect Maternal Grandfather     Heart disease Maternal Grandfather     Obesity Paternal Grandfather        Social History     Tobacco Use    Smoking status: Never Smoker    Smokeless tobacco: Never Used   Substance Use Topics    Alcohol use: Yes     Comment: socially- none with pregnancy    Drug use: No     Review of Systems   Constitution: Negative for chills, decreased appetite, fever and malaise/fatigue  Gastrointestinal: Negative for bloating, abdominal pain, nausea and vomiting  Genitourinary: Negative for dysuria, frequency, genital sores and non-menstrual bleeding  Neurological: Negative for dizziness, headaches, light-headedness and weakness  Objective  Vitals:    12/31/19 1041   BP: 112/62     Physical Exam   Constitutional: She is oriented to person, place, and time  She appears well-developed and well-nourished  Genitourinary: Vagina normal  There is no lesion on the right labia  There is no lesion on the left labia  No bleeding in the vagina  Right adnexum does not display tenderness  Left adnexum does not display tenderness  Cervix does not exhibit discharge  Uterus is enlarged  Uterus is not tender  HENT:   Head: Normocephalic  Cardiovascular: Normal rate and regular rhythm  Pulmonary/Chest: Effort normal and breath sounds normal  Right breast exhibits no mass and no nipple discharge  Left breast exhibits no mass and no nipple discharge  Abdominal: Soft  She exhibits no distension  Musculoskeletal: She exhibits no edema  Neurological: She is alert and oriented to person, place, and time  Skin: Skin is warm and dry  Psychiatric: She has a normal mood and affect  Vitals reviewed

## 2020-01-01 LAB
C TRACH DNA SPEC QL NAA+PROBE: NEGATIVE
N GONORRHOEA DNA SPEC QL NAA+PROBE: NEGATIVE

## 2020-01-03 ENCOUNTER — TELEPHONE (OUTPATIENT)
Dept: OBGYN CLINIC | Facility: CLINIC | Age: 30
End: 2020-01-03

## 2020-01-27 ENCOUNTER — ROUTINE PRENATAL (OUTPATIENT)
Dept: OBGYN CLINIC | Facility: CLINIC | Age: 30
End: 2020-01-27

## 2020-01-27 VITALS — DIASTOLIC BLOOD PRESSURE: 62 MMHG | WEIGHT: 154.2 LBS | SYSTOLIC BLOOD PRESSURE: 112 MMHG | BODY MASS INDEX: 28.2 KG/M2

## 2020-01-27 DIAGNOSIS — Z34.82 PRENATAL CARE, SUBSEQUENT PREGNANCY, SECOND TRIMESTER: Primary | ICD-10-CM

## 2020-01-27 LAB
SL AMB  POCT GLUCOSE, UA: NORMAL
SL AMB POCT URINE PROTEIN: NORMAL

## 2020-01-27 PROCEDURE — PNV: Performed by: OBSTETRICS & GYNECOLOGY

## 2020-01-27 NOTE — PROGRESS NOTES
Level 2 scheduled for 2/20  Declined Genetic testing  Denies pain/ cramping or spotting  Was having headaches daily- taking Bps at home and work  Have been normal  No H/A for over a week now

## 2020-02-03 ENCOUNTER — TELEPHONE (OUTPATIENT)
Dept: INTERNAL MEDICINE CLINIC | Facility: CLINIC | Age: 30
End: 2020-02-03

## 2020-02-03 ENCOUNTER — TELEPHONE (OUTPATIENT)
Dept: OBGYN CLINIC | Facility: CLINIC | Age: 30
End: 2020-02-03

## 2020-02-03 DIAGNOSIS — R68.89 FLU-LIKE SYMPTOMS: Primary | ICD-10-CM

## 2020-02-03 RX ORDER — OSELTAMIVIR PHOSPHATE 75 MG/1
75 CAPSULE ORAL EVERY 12 HOURS SCHEDULED
Qty: 10 CAPSULE | Refills: 0 | OUTPATIENT
Start: 2020-02-03 | End: 2020-02-08

## 2020-02-03 NOTE — TELEPHONE ENCOUNTER
Pt calling in stating she is 18 weeks pregnant and called her OB because she is having flu like symptoms (see telephone message) and they sent in tamiflu for her and told her to call her PCP  Alejandra Ashton I scheduled her for 2/4 @10:45am however does she need to come in?  Please advise, ty

## 2020-02-03 NOTE — TELEPHONE ENCOUNTER
I returned pt call - pt stated she took tylenol and she is not sure if its the flu - pt stated having diarrhea and vomiting  Per kevyn BAEZ I advised pt to call pcp to be seen today or go to [facility] now to be evaluated and treated  Please advise if any other recommendations

## 2020-02-03 NOTE — TELEPHONE ENCOUNTER
Patient is currently 18 weeks pregnant and has flu like symptoms  Patient has a fever of 101 3 Patient also states she was vomiting and had diarrhea last night  Please advise

## 2020-02-03 NOTE — TELEPHONE ENCOUNTER
Dear Dr Ayse Gonzales:    Returned Pt's call today  Pt advised to contact/touch base with her primary care physician with regards to flu-like symptoms per your recommendation  Pt informed Rx for Tamiflu 75 mg capsule (Take 1 capsule PO BID for 5 days - dispense 10 refills 0) was electronically forwarded to Pt's pharmacy in EHR by phone  Pt further advised to continue to monitor symptoms  If symptoms worsen or do not improve after completion of prescribed medication to call back office  Reiterated to Pt that if she develops dizziness or SOB to report to nearest ED for evaluation of symptoms  "Patient Call" routed to you for Rx signature  Thank you!     Reji Zhao MA

## 2020-02-04 ENCOUNTER — OFFICE VISIT (OUTPATIENT)
Dept: INTERNAL MEDICINE CLINIC | Facility: CLINIC | Age: 30
End: 2020-02-04
Payer: COMMERCIAL

## 2020-02-04 VITALS
HEART RATE: 105 BPM | SYSTOLIC BLOOD PRESSURE: 110 MMHG | DIASTOLIC BLOOD PRESSURE: 62 MMHG | WEIGHT: 151.4 LBS | OXYGEN SATURATION: 97 % | HEIGHT: 62 IN | BODY MASS INDEX: 27.86 KG/M2 | TEMPERATURE: 98.6 F

## 2020-02-04 DIAGNOSIS — J06.9 UPPER RESPIRATORY TRACT INFECTION, UNSPECIFIED TYPE: Primary | ICD-10-CM

## 2020-02-04 PROCEDURE — 1036F TOBACCO NON-USER: CPT | Performed by: INTERNAL MEDICINE

## 2020-02-04 PROCEDURE — 99213 OFFICE O/P EST LOW 20 MIN: CPT | Performed by: INTERNAL MEDICINE

## 2020-02-04 PROCEDURE — 87631 RESP VIRUS 3-5 TARGETS: CPT | Performed by: INTERNAL MEDICINE

## 2020-02-04 NOTE — PROGRESS NOTES
Assessment/Plan:    URI (upper respiratory infection)  No signs of strep on exam, discussed with patient the possibility the flu and treatment with this with Tamiflu  Will check flu test since she is 18 weeks pregnant  Patient is nontoxic  Discussed that she does have the flu, she will get better without the Tamiflu, but Tamiflu can shorten the duration of her illness if taking early on in the course of the illness  Will await results patient is starting to feel better, she takes a turn for the worse in the next day or so, I recommend taking the Tamiflu  Diagnoses and all orders for this visit:    Upper respiratory tract infection, unspecified type        BMI Counseling: Body mass index is 27 69 kg/m²  Follow-up plan was not completed due to patient being in urgent or emergent medical situation  pregnant        Subjective:      Patient ID: Kati Corado is a 34 y o  female  Two days ago patient had nausea, vomiting, diarrhea  She had a temperature of 101 3°  She is 18 weeks pregnant, she was prescribed Tamiflu by her OB [facility]ne, but she did not pick it up yet  The patient did feel achy, headache, no sore throat or cold symptoms  Patient is starting to feel better today, no fevers today, but she is taking Tylenol  No diarrhea today  The following portions of the patient's history were reviewed and updated as appropriate: allergies, current medications, past family history, past medical history, past social history, past surgical history and problem list     Review of Systems   Constitutional: Positive for fatigue and fever  HENT: Negative for congestion and sore throat  Respiratory: Negative for cough  Musculoskeletal: Positive for arthralgias and myalgias  Neurological: Positive for headaches           Objective:      /62   Pulse 105   Temp 98 6 °F (37 °C)   Ht 5' 2" (1 575 m)   Wt 68 7 kg (151 lb 6 4 oz)   LMP 10/03/2019 (Exact Date)   SpO2 97%   BMI 27 69 kg/m² Physical Exam   Constitutional: She is oriented to person, place, and time  She appears well-developed and well-nourished  No distress  HENT:   Head: Normocephalic and atraumatic  Right Ear: External ear normal    Left Ear: External ear normal    Mouth/Throat: Oropharynx is clear and moist    Eyes: Conjunctivae are normal  No scleral icterus  Neck: Normal range of motion  Neck supple  No tracheal deviation present  No thyromegaly present  Cardiovascular: Normal rate, regular rhythm and normal heart sounds  Pulmonary/Chest: Effort normal and breath sounds normal  No respiratory distress  She has no wheezes  She has no rales  Abdominal: Soft  Bowel sounds are normal  There is no tenderness  There is no rebound and no guarding  Musculoskeletal: She exhibits no edema  Lymphadenopathy:     She has no cervical adenopathy  Neurological: She is alert and oriented to person, place, and time  Psychiatric: She has a normal mood and affect  Her behavior is normal  Judgment and thought content normal    Vitals reviewed

## 2020-02-04 NOTE — PATIENT INSTRUCTIONS
Problem List Items Addressed This Visit        Respiratory    URI (upper respiratory infection) - Primary     No signs of strep on exam, discussed with patient the possibility the flu and treatment with this with Tamiflu  Will check flu test since she is 18 weeks pregnant  Patient is nontoxic  Discussed that she does have the flu, she will get better without the Tamiflu, but Tamiflu can shorten the duration of her illness if taking early on in the course of the illness  Will await results patient is starting to feel better, she takes a turn for the worse in the next day or so, I recommend taking the Tamiflu

## 2020-02-04 NOTE — TELEPHONE ENCOUNTER
She should probably get checked out to make sure nothing else is going on like strep throat which would be treated differently

## 2020-02-05 LAB
FLUAV RNA NPH QL NAA+PROBE: NORMAL
FLUBV RNA NPH QL NAA+PROBE: NORMAL
RSV RNA NPH QL NAA+PROBE: NORMAL

## 2020-02-06 ENCOUNTER — TELEPHONE (OUTPATIENT)
Dept: INTERNAL MEDICINE CLINIC | Facility: CLINIC | Age: 30
End: 2020-02-06

## 2020-02-19 PROBLEM — Z3A.20 20 WEEKS GESTATION OF PREGNANCY: Status: ACTIVE | Noted: 2020-02-19

## 2020-02-20 ENCOUNTER — ROUTINE PRENATAL (OUTPATIENT)
Dept: PERINATAL CARE | Facility: CLINIC | Age: 30
End: 2020-02-20
Payer: COMMERCIAL

## 2020-02-20 VITALS
DIASTOLIC BLOOD PRESSURE: 78 MMHG | WEIGHT: 159.4 LBS | HEIGHT: 62 IN | HEART RATE: 75 BPM | SYSTOLIC BLOOD PRESSURE: 134 MMHG | BODY MASS INDEX: 29.33 KG/M2

## 2020-02-20 DIAGNOSIS — Z3A.20 20 WEEKS GESTATION OF PREGNANCY: ICD-10-CM

## 2020-02-20 DIAGNOSIS — Z34.81 PRENATAL CARE, SUBSEQUENT PREGNANCY, FIRST TRIMESTER: ICD-10-CM

## 2020-02-20 DIAGNOSIS — Z36.3 ENCOUNTER FOR ANTENATAL SCREENING FOR MALFORMATION USING ULTRASOUND: Primary | ICD-10-CM

## 2020-02-20 DIAGNOSIS — Z36.86 ENCOUNTER FOR ANTENATAL SCREENING FOR CERVICAL LENGTH: ICD-10-CM

## 2020-02-20 PROCEDURE — 76805 OB US >/= 14 WKS SNGL FETUS: CPT | Performed by: OBSTETRICS & GYNECOLOGY

## 2020-02-20 PROCEDURE — 99241 PR OFFICE CONSULTATION NEW/ESTAB PATIENT 15 MIN: CPT | Performed by: OBSTETRICS & GYNECOLOGY

## 2020-02-20 PROCEDURE — 76817 TRANSVAGINAL US OBSTETRIC: CPT | Performed by: OBSTETRICS & GYNECOLOGY

## 2020-02-20 NOTE — PROGRESS NOTES
A transvaginal ultrasound was performed  Sonographer note on use of High Level Disinfection Process (Trophon) for transvaginal probe# 1 used, serial # [id number]    Juventino Tinajero RDMS

## 2020-02-20 NOTE — PROGRESS NOTES
[address], [place]  [facility], [address] [place] [address]     Thank you for referring your Marley Duggan for a Maternal-Fetal Medicine Consultation:  Below is my consultation  Thank you very much for requesting a consultation on this very nice patient for the indication of a fetal anatomic evaluation  This is the patient's 3rd pregnancy  Her 1st pregnancy resulted in an early term vaginal delivery which was secondary to an induction due to hypertension  She has no other significant medical history  She has a surgical history significant for a tonsillectomy in 2013  She denies the current use of tobacco, alcohol, or drugs  She currently takes prenatal vitamins with DHA and has an allergy to sulfa medications  Her mother has hypertension  On exam today Vangie Boyd appears well, in no acute distress, and denies any complaints  Her abdomen is non-tender  The patient has declined genetic screening, and we discussed that a normal ultrasound does not exclude all congenital birth defects or karyotypic abnormalities  The fetal anatomic survey is complete  There is no sonographic evidence of fetal abnormalities at this time  Good fetal movement and tone are seen  The amniotic fluid volume appears normal    A transvaginal ultrasound was performed to assess the cervix, which was not seen well transabdominally  The cervical length was 4 5 centimeters, which is normal for the current gestational age  There was no significant funneling or dynamic changes appreciated  The patient was informed of today's findings and all of her questions were answered  The limitations of ultrasound were reviewed  Given the patient's history of prior hypertension, I recommend initiating low dose aspirin therapy    A recent meta-analysis yielded risk reductions of 24% for preeclampsia, 20% for intrauterine growth restriction, and 14% for  birth, with an absolute risk reduction of 2-5% for preeclampsia, one to 5% for intrauterine growth restriction, and 2-4% for  birth  In this study, there was no identified risk of harm to the mother or fetus but long-term evidence was somewhat limited  Given the overall safety profile and risk-benefit analysis, I recommend 162 mg of aspirin be taken Daily until delivery  I reviewed these recommendations with the patient and answered all of her questions to apparent satisfaction  We discussed follow-up in detail, and I recommend Dolores Camper return as clinically indicated  Please note, in addition to the time spent discussing the results of the ultrasound, I spent approximately 15 minutes of face-to-face time with the patient, greater than 50% of which was spent in counseling and the coordination of care for this patient  Thank you very much for allowing us to participate in the care of this very nice patient  Should you have any questions, please do not hesitate to contact our office  Sukhi Kaba MD  Attending Physician, [place]

## 2020-02-20 NOTE — LETTER
February 20, 2020     Ayaan Cotter, [address] [id number]    Patient: Manuel Benson   YOB: 1990   Date of Visit: 2/20/2020       Dear Dr Vi Catalan: Thank you for referring Allegra Wilburn to me for evaluation  Below are my notes for this consultation  If you have questions, please do not hesitate to call me  I look forward to following your patient along with you  Sincerely,        Araceli Huertas MD        CC: No Recipients  Araceli Huertas MD  2/20/2020 12:14 PM  Sign at close encounter  [address], [place]  [facility], [address] [place] [address]     Thank you for referring your Manuel Benson for a Maternal-Fetal Medicine Consultation:  Below is my consultation  Thank you very much for requesting a consultation on this very nice patient for the indication of a fetal anatomic evaluation  This is the patient's 3rd pregnancy  Her 1st pregnancy resulted in an early term vaginal delivery which was secondary to an induction due to hypertension  She has no other significant medical history  She has a surgical history significant for a tonsillectomy in 2013  She denies the current use of tobacco, alcohol, or drugs  She currently takes prenatal vitamins with DHA and has an allergy to sulfa medications  Her mother has hypertension  On exam today Rosa Julio appears well, in no acute distress, and denies any complaints  Her abdomen is non-tender  The patient has declined genetic screening, and we discussed that a normal ultrasound does not exclude all congenital birth defects or karyotypic abnormalities  The fetal anatomic survey is complete  There is no sonographic evidence of fetal abnormalities at this time  Good fetal movement and tone are seen  The amniotic fluid volume appears normal    A transvaginal ultrasound was performed to assess the cervix, which was not seen well transabdominally    The cervical length was 4 5 centimeters, which is normal for the current gestational age  There was no significant funneling or dynamic changes appreciated  The patient was informed of today's findings and all of her questions were answered  The limitations of ultrasound were reviewed  Given the patient's history of prior hypertension, I recommend initiating low dose aspirin therapy  A recent meta-analysis yielded risk reductions of 24% for preeclampsia, 20% for intrauterine growth restriction, and 14% for  birth, with an absolute risk reduction of 2-5% for preeclampsia, one to 5% for intrauterine growth restriction, and 2-4% for  birth  In this study, there was no identified risk of harm to the mother or fetus but long-term evidence was somewhat limited  Given the overall safety profile and risk-benefit analysis, I recommend 162 mg of aspirin be taken Daily until delivery  I reviewed these recommendations with the patient and answered all of her questions to apparent satisfaction  We discussed follow-up in detail, and I recommend [place] return as clinically indicated  Please note, in addition to the time spent discussing the results of the ultrasound, I spent approximately 15 minutes of face-to-face time with the patient, greater than 50% of which was spent in counseling and the coordination of care for this patient  Thank you very much for allowing us to participate in the care of this very nice patient  Should you have any questions, please do not hesitate to contact our office  Sukhi Eng MD  Attending Physician, [place]

## 2020-02-24 ENCOUNTER — ROUTINE PRENATAL (OUTPATIENT)
Dept: OBGYN CLINIC | Facility: CLINIC | Age: 30
End: 2020-02-24

## 2020-02-24 VITALS — DIASTOLIC BLOOD PRESSURE: 74 MMHG | SYSTOLIC BLOOD PRESSURE: 120 MMHG | WEIGHT: 158.6 LBS | BODY MASS INDEX: 29.01 KG/M2

## 2020-02-24 DIAGNOSIS — Z34.82 PRENATAL CARE, SUBSEQUENT PREGNANCY, SECOND TRIMESTER: Primary | ICD-10-CM

## 2020-02-24 LAB
SL AMB  POCT GLUCOSE, UA: NORMAL
SL AMB POCT URINE PROTEIN: NORMAL

## 2020-02-24 PROCEDURE — PNV: Performed by: OBSTETRICS & GYNECOLOGY

## 2020-03-17 ENCOUNTER — TELEPHONE (OUTPATIENT)
Dept: CARDIOLOGY CLINIC | Facility: CLINIC | Age: 30
End: 2020-03-17

## 2020-03-17 NOTE — TELEPHONE ENCOUNTER
Spoke to patient and reviewed travel screening questions  They are negative  I also explained to her that we will be asking the same questions again at the time of her appt    Also advised patient she may only have 1 person attend the appt with her and they will also be asked the travel screening questions

## 2020-03-23 ENCOUNTER — ROUTINE PRENATAL (OUTPATIENT)
Dept: OBGYN CLINIC | Facility: CLINIC | Age: 30
End: 2020-03-23

## 2020-03-23 VITALS — WEIGHT: 164 LBS | DIASTOLIC BLOOD PRESSURE: 66 MMHG | SYSTOLIC BLOOD PRESSURE: 110 MMHG | BODY MASS INDEX: 30 KG/M2

## 2020-03-23 DIAGNOSIS — Z34.82 PRENATAL CARE, SUBSEQUENT PREGNANCY, SECOND TRIMESTER: Primary | ICD-10-CM

## 2020-03-23 DIAGNOSIS — Z3A.24 24 WEEKS GESTATION OF PREGNANCY: ICD-10-CM

## 2020-03-23 LAB
SL AMB  POCT GLUCOSE, UA: NEGATIVE
SL AMB POCT URINE PROTEIN: NEGATIVE

## 2020-03-23 PROCEDURE — PNV: Performed by: NURSE PRACTITIONER

## 2020-03-23 NOTE — PROGRESS NOTES
Problem List Items Addressed This Visit        Other    24 weeks gestation of pregnancy    Prenatal care, subsequent pregnancy, second trimester - Primary     Denies OB complaints  Good fetal movement  Denies contractions, cramping, leakage of fluid or vaginal bleeding  Level 2 ultrasound normal  Having a boy-Ata! No further ultrasounds indicated at this time  Provided slip and explained 28 week labs  S/p flu vaccine  Reviewed reasons to call  RTO in 4 weeks             Relevant Orders    CBC and differential    Glucose, 1H PG    RPR    POCT urine dip (Completed)

## 2020-03-23 NOTE — ASSESSMENT & PLAN NOTE
Denies OB complaints  Good fetal movement  Denies contractions, cramping, leakage of fluid or vaginal bleeding  Level 2 ultrasound normal  Having a boy-Ata! No further ultrasounds indicated at this time  Provided slip and explained 28 week labs  S/p flu vaccine  Reviewed reasons to call  RTO in 4 weeks

## 2020-04-06 ENCOUNTER — APPOINTMENT (OUTPATIENT)
Dept: LAB | Facility: MEDICAL CENTER | Age: 30
End: 2020-04-06
Payer: COMMERCIAL

## 2020-04-06 DIAGNOSIS — Z34.82 PRENATAL CARE, SUBSEQUENT PREGNANCY, SECOND TRIMESTER: ICD-10-CM

## 2020-04-06 LAB
BASOPHILS # BLD AUTO: 0.03 THOUSANDS/ΜL (ref 0–0.1)
BASOPHILS NFR BLD AUTO: 0 % (ref 0–1)
EOSINOPHIL # BLD AUTO: 0.17 THOUSAND/ΜL (ref 0–0.61)
EOSINOPHIL NFR BLD AUTO: 2 % (ref 0–6)
ERYTHROCYTE [DISTWIDTH] IN BLOOD BY AUTOMATED COUNT: 14.8 % (ref 11.6–15.1)
GLUCOSE 1H P 50 G GLC PO SERPL-MCNC: 168 MG/DL
HCT VFR BLD AUTO: 40.7 % (ref 34.8–46.1)
HGB BLD-MCNC: 13.4 G/DL (ref 11.5–15.4)
IMM GRANULOCYTES # BLD AUTO: 0.05 THOUSAND/UL (ref 0–0.2)
IMM GRANULOCYTES NFR BLD AUTO: 1 % (ref 0–2)
LYMPHOCYTES # BLD AUTO: 1.8 THOUSANDS/ΜL (ref 0.6–4.47)
LYMPHOCYTES NFR BLD AUTO: 17 % (ref 14–44)
MCH RBC QN AUTO: 30 PG (ref 26.8–34.3)
MCHC RBC AUTO-ENTMCNC: 32.9 G/DL (ref 31.4–37.4)
MCV RBC AUTO: 91 FL (ref 82–98)
MONOCYTES # BLD AUTO: 0.42 THOUSAND/ΜL (ref 0.17–1.22)
MONOCYTES NFR BLD AUTO: 4 % (ref 4–12)
NEUTROPHILS # BLD AUTO: 8.45 THOUSANDS/ΜL (ref 1.85–7.62)
NEUTS SEG NFR BLD AUTO: 76 % (ref 43–75)
NRBC BLD AUTO-RTO: 0 /100 WBCS
PLATELET # BLD AUTO: 272 THOUSANDS/UL (ref 149–390)
PMV BLD AUTO: 10.3 FL (ref 8.9–12.7)
RBC # BLD AUTO: 4.47 MILLION/UL (ref 3.81–5.12)
WBC # BLD AUTO: 10.92 THOUSAND/UL (ref 4.31–10.16)

## 2020-04-06 PROCEDURE — 85025 COMPLETE CBC W/AUTO DIFF WBC: CPT

## 2020-04-06 PROCEDURE — 82950 GLUCOSE TEST: CPT

## 2020-04-06 PROCEDURE — 36415 COLL VENOUS BLD VENIPUNCTURE: CPT

## 2020-04-06 PROCEDURE — 86592 SYPHILIS TEST NON-TREP QUAL: CPT

## 2020-04-07 LAB — RPR SER QL: NORMAL

## 2020-04-09 ENCOUNTER — TELEPHONE (OUTPATIENT)
Dept: OBGYN CLINIC | Facility: CLINIC | Age: 30
End: 2020-04-09

## 2020-04-09 DIAGNOSIS — R73.09 ABNORMAL GTT (GLUCOSE TOLERANCE TEST): Primary | ICD-10-CM

## 2020-04-13 ENCOUNTER — APPOINTMENT (OUTPATIENT)
Dept: LAB | Facility: HOSPITAL | Age: 30
End: 2020-04-13
Payer: COMMERCIAL

## 2020-04-13 ENCOUNTER — TELEPHONE (OUTPATIENT)
Dept: OBGYN CLINIC | Facility: CLINIC | Age: 30
End: 2020-04-13

## 2020-04-13 DIAGNOSIS — R73.09 ABNORMAL GTT (GLUCOSE TOLERANCE TEST): ICD-10-CM

## 2020-04-13 LAB
GLUCOSE 1H P 100 G GLC PO SERPL-MCNC: 122 MG/DL (ref 65–179)
GLUCOSE 2H P 100 G GLC PO SERPL-MCNC: 100 MG/DL (ref 65–154)
GLUCOSE 3H P 100 G GLC PO SERPL-MCNC: 116 MG/DL (ref 65–139)
GLUCOSE P FAST SERPL-MCNC: 82 MG/DL (ref 65–99)

## 2020-04-13 PROCEDURE — 36415 COLL VENOUS BLD VENIPUNCTURE: CPT

## 2020-04-13 PROCEDURE — 82951 GLUCOSE TOLERANCE TEST (GTT): CPT

## 2020-04-13 PROCEDURE — 82952 GTT-ADDED SAMPLES: CPT

## 2020-04-21 ENCOUNTER — ROUTINE PRENATAL (OUTPATIENT)
Dept: OBGYN CLINIC | Facility: CLINIC | Age: 30
End: 2020-04-21
Payer: COMMERCIAL

## 2020-04-21 ENCOUNTER — TELEPHONE (OUTPATIENT)
Dept: OTHER | Facility: OTHER | Age: 30
End: 2020-04-21

## 2020-04-21 VITALS — WEIGHT: 171.8 LBS | DIASTOLIC BLOOD PRESSURE: 68 MMHG | BODY MASS INDEX: 31.42 KG/M2 | SYSTOLIC BLOOD PRESSURE: 132 MMHG

## 2020-04-21 DIAGNOSIS — Z23 NEED FOR TDAP VACCINATION: Primary | ICD-10-CM

## 2020-04-21 DIAGNOSIS — Z34.83 PRENATAL CARE, SUBSEQUENT PREGNANCY, THIRD TRIMESTER: ICD-10-CM

## 2020-04-21 DIAGNOSIS — Z34.82 PRENATAL CARE, SUBSEQUENT PREGNANCY, SECOND TRIMESTER: ICD-10-CM

## 2020-04-21 LAB
SL AMB  POCT GLUCOSE, UA: NORMAL
SL AMB POCT URINE PROTEIN: NORMAL

## 2020-04-21 PROCEDURE — 90471 IMMUNIZATION ADMIN: CPT

## 2020-04-21 PROCEDURE — PNV: Performed by: OBSTETRICS & GYNECOLOGY

## 2020-04-21 PROCEDURE — 90715 TDAP VACCINE 7 YRS/> IM: CPT

## 2020-05-06 ENCOUNTER — ROUTINE PRENATAL (OUTPATIENT)
Dept: OBGYN CLINIC | Facility: CLINIC | Age: 30
End: 2020-05-06

## 2020-05-06 VITALS — DIASTOLIC BLOOD PRESSURE: 62 MMHG | BODY MASS INDEX: 31.97 KG/M2 | WEIGHT: 174.8 LBS | SYSTOLIC BLOOD PRESSURE: 122 MMHG

## 2020-05-06 DIAGNOSIS — Z34.83 PRENATAL CARE, SUBSEQUENT PREGNANCY, THIRD TRIMESTER: Primary | ICD-10-CM

## 2020-05-06 LAB
SL AMB  POCT GLUCOSE, UA: NORMAL
SL AMB POCT URINE PROTEIN: NORMAL

## 2020-05-06 PROCEDURE — PNV: Performed by: OBSTETRICS & GYNECOLOGY

## 2020-05-12 ENCOUNTER — OFFICE VISIT (OUTPATIENT)
Dept: CARDIOLOGY CLINIC | Facility: CLINIC | Age: 30
End: 2020-05-12
Payer: COMMERCIAL

## 2020-05-12 VITALS
DIASTOLIC BLOOD PRESSURE: 72 MMHG | BODY MASS INDEX: 32.2 KG/M2 | OXYGEN SATURATION: 99 % | HEART RATE: 101 BPM | HEIGHT: 62 IN | SYSTOLIC BLOOD PRESSURE: 132 MMHG | WEIGHT: 175 LBS

## 2020-05-12 DIAGNOSIS — Z3A.24 24 WEEKS GESTATION OF PREGNANCY: ICD-10-CM

## 2020-05-12 DIAGNOSIS — R94.31 ABNORMAL EKG: Primary | ICD-10-CM

## 2020-05-12 PROCEDURE — 99213 OFFICE O/P EST LOW 20 MIN: CPT | Performed by: INTERNAL MEDICINE

## 2020-05-12 PROCEDURE — 1036F TOBACCO NON-USER: CPT | Performed by: INTERNAL MEDICINE

## 2020-05-18 ENCOUNTER — ROUTINE PRENATAL (OUTPATIENT)
Dept: OBGYN CLINIC | Facility: CLINIC | Age: 30
End: 2020-05-18

## 2020-05-18 VITALS — BODY MASS INDEX: 32.15 KG/M2 | WEIGHT: 175.8 LBS | DIASTOLIC BLOOD PRESSURE: 62 MMHG | SYSTOLIC BLOOD PRESSURE: 112 MMHG

## 2020-05-18 DIAGNOSIS — R94.31 ABNORMAL EKG: ICD-10-CM

## 2020-05-18 DIAGNOSIS — Z34.83 PRENATAL CARE, SUBSEQUENT PREGNANCY, THIRD TRIMESTER: Primary | ICD-10-CM

## 2020-05-18 DIAGNOSIS — R73.09 ABNORMAL GTT (GLUCOSE TOLERANCE TEST): ICD-10-CM

## 2020-05-18 LAB
SL AMB  POCT GLUCOSE, UA: NORMAL
SL AMB POCT URINE PROTEIN: NORMAL

## 2020-05-18 PROCEDURE — PNV: Performed by: STUDENT IN AN ORGANIZED HEALTH CARE EDUCATION/TRAINING PROGRAM

## 2020-06-03 ENCOUNTER — TELEPHONE (OUTPATIENT)
Dept: OBGYN CLINIC | Facility: CLINIC | Age: 30
End: 2020-06-03

## 2020-06-04 ENCOUNTER — ROUTINE PRENATAL (OUTPATIENT)
Dept: OBGYN CLINIC | Facility: CLINIC | Age: 30
End: 2020-06-04

## 2020-06-04 VITALS — BODY MASS INDEX: 33.25 KG/M2 | SYSTOLIC BLOOD PRESSURE: 102 MMHG | WEIGHT: 181.8 LBS | DIASTOLIC BLOOD PRESSURE: 56 MMHG

## 2020-06-04 DIAGNOSIS — Z34.83 PRENATAL CARE, SUBSEQUENT PREGNANCY, THIRD TRIMESTER: Primary | ICD-10-CM

## 2020-06-04 LAB
SL AMB  POCT GLUCOSE, UA: NORMAL
SL AMB POCT URINE PROTEIN: NORMAL

## 2020-06-04 PROCEDURE — PNV: Performed by: OBSTETRICS & GYNECOLOGY

## 2020-06-11 ENCOUNTER — ROUTINE PRENATAL (OUTPATIENT)
Dept: OBGYN CLINIC | Facility: CLINIC | Age: 30
End: 2020-06-11

## 2020-06-11 VITALS — SYSTOLIC BLOOD PRESSURE: 122 MMHG | BODY MASS INDEX: 33.58 KG/M2 | DIASTOLIC BLOOD PRESSURE: 54 MMHG | WEIGHT: 183.6 LBS

## 2020-06-11 DIAGNOSIS — Z3A.36 36 WEEKS GESTATION OF PREGNANCY: ICD-10-CM

## 2020-06-11 DIAGNOSIS — Z34.83 PRENATAL CARE, SUBSEQUENT PREGNANCY, THIRD TRIMESTER: Primary | ICD-10-CM

## 2020-06-11 LAB
SL AMB POCT KETONES,UA: NORMAL
SL AMB POCT URINE PROTEIN: NORMAL

## 2020-06-11 PROCEDURE — PNV: Performed by: OBSTETRICS & GYNECOLOGY

## 2020-06-11 PROCEDURE — 87653 STREP B DNA AMP PROBE: CPT | Performed by: OBSTETRICS & GYNECOLOGY

## 2020-06-13 LAB — GP B STREP DNA SPEC QL NAA+PROBE: ABNORMAL

## 2020-06-18 ENCOUNTER — ROUTINE PRENATAL (OUTPATIENT)
Dept: OBGYN CLINIC | Facility: CLINIC | Age: 30
End: 2020-06-18

## 2020-06-18 VITALS
BODY MASS INDEX: 33.8 KG/M2 | DIASTOLIC BLOOD PRESSURE: 70 MMHG | SYSTOLIC BLOOD PRESSURE: 128 MMHG | WEIGHT: 184.8 LBS | TEMPERATURE: 96.6 F

## 2020-06-18 DIAGNOSIS — Z34.83 PRENATAL CARE, SUBSEQUENT PREGNANCY, THIRD TRIMESTER: Primary | ICD-10-CM

## 2020-06-18 LAB
SL AMB  POCT GLUCOSE, UA: NORMAL
SL AMB POCT URINE PROTEIN: NORMAL

## 2020-06-18 PROCEDURE — PNV: Performed by: OBSTETRICS & GYNECOLOGY

## 2020-06-24 ENCOUNTER — TELEPHONE (OUTPATIENT)
Dept: OTHER | Facility: OTHER | Age: 30
End: 2020-06-24

## 2020-06-26 ENCOUNTER — ROUTINE PRENATAL (OUTPATIENT)
Dept: OBGYN CLINIC | Facility: CLINIC | Age: 30
End: 2020-06-26

## 2020-06-26 VITALS — WEIGHT: 190 LBS | DIASTOLIC BLOOD PRESSURE: 62 MMHG | SYSTOLIC BLOOD PRESSURE: 118 MMHG | BODY MASS INDEX: 34.75 KG/M2

## 2020-06-26 DIAGNOSIS — Z34.83 PRENATAL CARE, SUBSEQUENT PREGNANCY, THIRD TRIMESTER: Primary | ICD-10-CM

## 2020-06-26 LAB
SL AMB  POCT GLUCOSE, UA: NORMAL
SL AMB POCT URINE PROTEIN: NORMAL

## 2020-06-26 PROCEDURE — PNV: Performed by: OBSTETRICS & GYNECOLOGY

## 2020-07-02 ENCOUNTER — ROUTINE PRENATAL (OUTPATIENT)
Dept: OBGYN CLINIC | Facility: CLINIC | Age: 30
End: 2020-07-02

## 2020-07-02 VITALS — BODY MASS INDEX: 34.71 KG/M2 | DIASTOLIC BLOOD PRESSURE: 64 MMHG | SYSTOLIC BLOOD PRESSURE: 112 MMHG | WEIGHT: 189.8 LBS

## 2020-07-02 DIAGNOSIS — Z34.83 PRENATAL CARE, SUBSEQUENT PREGNANCY, THIRD TRIMESTER: Primary | ICD-10-CM

## 2020-07-02 DIAGNOSIS — Z3A.39 39 WEEKS GESTATION OF PREGNANCY: ICD-10-CM

## 2020-07-02 LAB
SL AMB  POCT GLUCOSE, UA: NORMAL
SL AMB POCT URINE PROTEIN: NORMAL

## 2020-07-02 PROCEDURE — PNV: Performed by: OBSTETRICS & GYNECOLOGY

## 2020-07-05 ENCOUNTER — HOSPITAL ENCOUNTER (INPATIENT)
Facility: HOSPITAL | Age: 30
LOS: 4 days | Discharge: HOME | End: 2020-07-09
Attending: STUDENT IN AN ORGANIZED HEALTH CARE EDUCATION/TRAINING PROGRAM | Admitting: STUDENT IN AN ORGANIZED HEALTH CARE EDUCATION/TRAINING PROGRAM
Payer: COMMERCIAL

## 2020-07-05 ENCOUNTER — HOSPITAL ENCOUNTER (OUTPATIENT)
Dept: LABOR AND DELIVERY | Facility: HOSPITAL | Age: 30
Discharge: HOME | End: 2020-07-05
Payer: COMMERCIAL

## 2020-07-05 DIAGNOSIS — Z3A.39 39 WEEKS GESTATION OF PREGNANCY: Primary | ICD-10-CM

## 2020-07-05 DIAGNOSIS — Z98.891 S/P CESAREAN SECTION: ICD-10-CM

## 2020-07-05 LAB
ERYTHROCYTE [DISTWIDTH] IN BLOOD BY AUTOMATED COUNT: 14.6 % (ref 11.6–15.1)
HCT VFR BLD AUTO: 42.6 % (ref 34.8–46.1)
HGB BLD-MCNC: 14.2 G/DL (ref 11.5–15.4)
MCH RBC QN AUTO: 30 PG (ref 26.8–34.3)
MCHC RBC AUTO-ENTMCNC: 33.3 G/DL (ref 31.4–37.4)
MCV RBC AUTO: 90 FL (ref 82–98)
PLATELET # BLD AUTO: 214 THOUSANDS/UL (ref 149–390)
PMV BLD AUTO: 10.6 FL (ref 8.9–12.7)
RBC # BLD AUTO: 4.74 MILLION/UL (ref 3.81–5.12)
WBC # BLD AUTO: 11.15 THOUSAND/UL (ref 4.31–10.16)

## 2020-07-05 PROCEDURE — 86900 BLOOD TYPING SEROLOGIC ABO: CPT | Performed by: OBSTETRICS & GYNECOLOGY

## 2020-07-05 PROCEDURE — 86592 SYPHILIS TEST NON-TREP QUAL: CPT | Performed by: OBSTETRICS & GYNECOLOGY

## 2020-07-05 PROCEDURE — 85027 COMPLETE CBC AUTOMATED: CPT | Performed by: OBSTETRICS & GYNECOLOGY

## 2020-07-05 PROCEDURE — 86901 BLOOD TYPING SEROLOGIC RH(D): CPT | Performed by: OBSTETRICS & GYNECOLOGY

## 2020-07-05 PROCEDURE — 86850 RBC ANTIBODY SCREEN: CPT | Performed by: OBSTETRICS & GYNECOLOGY

## 2020-07-05 PROCEDURE — 99024 POSTOP FOLLOW-UP VISIT: CPT | Performed by: STUDENT IN AN ORGANIZED HEALTH CARE EDUCATION/TRAINING PROGRAM

## 2020-07-05 RX ORDER — ONDANSETRON 2 MG/ML
4 INJECTION INTRAMUSCULAR; INTRAVENOUS EVERY 6 HOURS PRN
Status: DISCONTINUED | OUTPATIENT
Start: 2020-07-05 | End: 2020-07-07

## 2020-07-05 RX ORDER — SODIUM CHLORIDE, SODIUM LACTATE, POTASSIUM CHLORIDE, CALCIUM CHLORIDE 600; 310; 30; 20 MG/100ML; MG/100ML; MG/100ML; MG/100ML
125 INJECTION, SOLUTION INTRAVENOUS CONTINUOUS
Status: DISCONTINUED | OUTPATIENT
Start: 2020-07-05 | End: 2020-07-07

## 2020-07-06 ENCOUNTER — ANESTHESIA EVENT (INPATIENT)
Dept: LABOR AND DELIVERY | Facility: HOSPITAL | Age: 30
End: 2020-07-06
Payer: COMMERCIAL

## 2020-07-06 ENCOUNTER — ANESTHESIA (INPATIENT)
Dept: LABOR AND DELIVERY | Facility: HOSPITAL | Age: 30
End: 2020-07-06
Payer: COMMERCIAL

## 2020-07-06 LAB
ABO GROUP BLD: NORMAL
BLD GP AB SCN SERPL QL: NEGATIVE
RH BLD: POSITIVE
RPR SER QL: NORMAL
SPECIMEN EXPIRATION DATE: NORMAL

## 2020-07-06 PROCEDURE — 3E033VJ INTRODUCTION OF OTHER HORMONE INTO PERIPHERAL VEIN, PERCUTANEOUS APPROACH: ICD-10-PCS | Performed by: STUDENT IN AN ORGANIZED HEALTH CARE EDUCATION/TRAINING PROGRAM

## 2020-07-06 RX ORDER — LIDOCAINE HYDROCHLORIDE AND EPINEPHRINE 15; 5 MG/ML; UG/ML
INJECTION, SOLUTION EPIDURAL
Status: COMPLETED | OUTPATIENT
Start: 2020-07-06 | End: 2020-07-06

## 2020-07-06 RX ORDER — OXYTOCIN/RINGER'S LACTATE 30/500 ML
1-30 PLASTIC BAG, INJECTION (ML) INTRAVENOUS
Status: DISCONTINUED | OUTPATIENT
Start: 2020-07-06 | End: 2020-07-07

## 2020-07-06 RX ADMIN — SODIUM CHLORIDE 2.5 MILLION UNITS: 9 INJECTION, SOLUTION INTRAVENOUS at 19:22

## 2020-07-06 RX ADMIN — SODIUM CHLORIDE 2.5 MILLION UNITS: 9 INJECTION, SOLUTION INTRAVENOUS at 03:04

## 2020-07-06 RX ADMIN — SODIUM CHLORIDE, SODIUM LACTATE, POTASSIUM CHLORIDE, AND CALCIUM CHLORIDE 125 ML/HR: .6; .31; .03; .02 INJECTION, SOLUTION INTRAVENOUS at 00:13

## 2020-07-06 RX ADMIN — SODIUM CHLORIDE 2.5 MILLION UNITS: 9 INJECTION, SOLUTION INTRAVENOUS at 07:06

## 2020-07-06 RX ADMIN — LIDOCAINE HYDROCHLORIDE AND EPINEPHRINE 3 ML: 15; 5 INJECTION, SOLUTION EPIDURAL at 23:47

## 2020-07-06 RX ADMIN — SODIUM CHLORIDE 2.5 MILLION UNITS: 9 INJECTION, SOLUTION INTRAVENOUS at 11:20

## 2020-07-06 RX ADMIN — SODIUM CHLORIDE 2.5 MILLION UNITS: 9 INJECTION, SOLUTION INTRAVENOUS at 23:15

## 2020-07-06 RX ADMIN — SODIUM CHLORIDE, SODIUM LACTATE, POTASSIUM CHLORIDE, AND CALCIUM CHLORIDE 125 ML/HR: .6; .31; .03; .02 INJECTION, SOLUTION INTRAVENOUS at 17:58

## 2020-07-06 RX ADMIN — SODIUM CHLORIDE, SODIUM LACTATE, POTASSIUM CHLORIDE, AND CALCIUM CHLORIDE 125 ML/HR: .6; .31; .03; .02 INJECTION, SOLUTION INTRAVENOUS at 07:06

## 2020-07-06 RX ADMIN — SODIUM CHLORIDE 5 MILLION UNITS: 0.9 INJECTION, SOLUTION INTRAVENOUS at 00:23

## 2020-07-06 RX ADMIN — SODIUM CHLORIDE 2.5 MILLION UNITS: 9 INJECTION, SOLUTION INTRAVENOUS at 15:03

## 2020-07-06 RX ADMIN — Medication 2 MILLI-UNITS/MIN: at 03:56

## 2020-07-06 NOTE — OB LABOR/OXYTOCIN SAFETY PROGRESS
Labor Progress Note - Raul Notice 34 y o  female MRN: [id number]    Unit/Bed#: -01 Encounter: [id number]       Contraction Frequency (minutes): 2-3  Contraction Quality: Mild      Dilation: Fingertip        Effacement (%): 50  Station: -2  Baseline Rate: 130 bpm  Fetal Heart Rate: (Tracing maternal HR from 3272-3548)  FHR Category: Category I             Notes/comments: Amanda bulb placed  Patient tolerated placement  Plan to begin pitocin titration to adequate MVUs  FHT reactive, Category I with moderate variability, present accelerations and no decelerations         Dionicio Adler MD 7/6/2020 3:43 AM

## 2020-07-06 NOTE — OB LABOR/OXYTOCIN SAFETY PROGRESS
Oxytocin Safety Progress Check Note - Valentin Lewis 34 y o  female MRN: [id number]    Unit/Bed#: -01 Encounter: [id number]    Dose (ki-units/min) Oxytocin: 4 ki-units/min  Contraction Frequency (minutes): 2-3  Contraction Quality: Mild      Dilation: Fingertip        Effacement (%): 50  Station: -2  Baseline Rate: 130 bpm  Fetal Heart Rate: 130 BPM  FHR Category: Category I             Notes/comments:     Amanda bulb in place  Continue pitocin titration      Payam Crook DO 7/6/2020 6:14 AM

## 2020-07-06 NOTE — OB LABOR/OXYTOCIN SAFETY PROGRESS
Oxytocin Safety Progress Check Note - Zina Hendricks 34 y o  female MRN: [id number]    Unit/Bed#: -01 Encounter: [id number]    Dose (ki-units/min) Oxytocin: 8 ki-units/min  Contraction Frequency (minutes): 2-3  Contraction Quality: Not applicable  Tachysystole: No   Dilation: (daugherty balloon still in place)        Effacement (%): 50  Station: -2  Baseline Rate: 125 bpm  Fetal Heart Rate: 130 BPM  FHR Category: Category I  Oxytocin Safety Progress Check: Safety check completed          Notes/comments:   Daugherty balloon still in place with gentle traction  Patient well otherwise    Pina Bryant DO 7/6/2020 12:45 PM

## 2020-07-06 NOTE — PLAN OF CARE
Problem: Potential for Falls  Goal: Patient will remain free of falls  Description  INTERVENTIONS:  - Assess patient frequently for physical needs  -  Identify cognitive and physical deficits and behaviors that affect risk of falls  -  [place] fall precautions as indicated by assessment   - Educate patient/family on patient safety including physical limitations  - Instruct patient to call for assistance with activity based on assessment  - Modify environment to reduce risk of injury  - Consider OT/PT consult to assist with strengthening/mobility  Outcome: Progressing     Problem: Knowledge Deficit  Goal: Verbalizes understanding of labor plan  Description  Assess patient/family/caregiver's baseline knowledge level and ability to understand information  Provide education via patient/family/caregiver's preferred learning method at appropriate level of understanding  1  Provide teaching at level of understanding  2  Provide teaching via preferred learning method(s)  Outcome: Progressing  Goal: Patient/family/caregiver demonstrates understanding of disease process, treatment plan, medications, and discharge instructions  Description  Complete learning assessment and assess knowledge base  Interventions:  - Provide teaching at level of understanding  - Provide teaching via preferred learning methods  Outcome: Progressing     Problem: Labor & Delivery  Goal: Manages discomfort  Description  Assess and monitor for signs and symptoms of discomfort  Assess patient's pain level regularly and per hospital policy  Administer medications as ordered  Support use of nonpharmacological methods to help control pain such as distraction, imagery, relaxation, and application of heat and cold  Collaborate with interdisciplinary team and patient to determine appropriate pain management plan  1  Include patient in decisions related to comfort  2  Offer non-pharmacological pain management interventions    3  Report ineffective pain management to physician  Outcome: Progressing  Goal: Patient vital signs are stable  Description  1  Assess vital signs - vaginal delivery    Outcome: Progressing     Problem: PAIN - ADULT  Goal: Verbalizes/displays adequate comfort level or baseline comfort level  Description  Interventions:  - Encourage patient to monitor pain and request assistance  - Assess pain using appropriate pain scale  - Administer analgesics based on type and severity of pain and evaluate response  - Implement non-pharmacological measures as appropriate and evaluate response  - Consider cultural and social influences on pain and pain management  - Notify physician/advanced practitioner if interventions unsuccessful or patient reports new pain  Outcome: Progressing     Problem: INFECTION - ADULT  Goal: Absence or prevention of progression during hospitalization  Description  INTERVENTIONS:  - Assess and monitor for signs and symptoms of infection  - Monitor lab/diagnostic results  - Monitor all insertion sites, i e  indwelling lines, tubes, and drains  - Monitor endotracheal if appropriate and nasal secretions for changes in amount and color  - [place] appropriate cooling/warming therapies per order  - Administer medications as ordered  - Instruct and encourage patient and family to use good hand hygiene technique  - Identify and instruct in appropriate isolation precautions for identified infection/condition  Outcome: Progressing  Goal: Absence of fever/infection during neutropenic period  Description  INTERVENTIONS:  - Monitor WBC    Outcome: Progressing     Problem: SAFETY ADULT  Goal: Maintain or return to baseline ADL function  Description  INTERVENTIONS:  -  Assess patient's ability to carry out ADLs; assess patient's baseline for ADL function and identify physical deficits which impact ability to perform ADLs (bathing, care of mouth/teeth, toileting, grooming, dressing, etc )  - Assess/evaluate cause of self-care deficits   - Assess range of motion  - Assess patient's mobility; develop plan if impaired  - Assess patient's need for assistive devices and provide as appropriate  - Encourage maximum independence but intervene and supervise when necessary  - Involve family in performance of ADLs  - Assess for home care needs following discharge   - Consider OT consult to assist with ADL evaluation and planning for discharge  - Provide patient education as appropriate  Outcome: Progressing  Goal: Maintain or return mobility status to optimal level  Description  INTERVENTIONS:  - Assess patient's baseline mobility status (ambulation, transfers, stairs, etc )    - Identify cognitive and physical deficits and behaviors that affect mobility  - Identify mobility aids required to assist with transfers and/or ambulation (gait belt, sit-to-stand, lift, walker, cane, etc )  - [place] fall precautions as indicated by assessment  - Record patient progress and toleration of activity level on Mobility SBAR; progress patient to next Phase/Stage  - Instruct patient to call for assistance with activity based on assessment  - Consider rehabilitation consult to assist with strengthening/weightbearing, etc   Outcome: Progressing     Problem: DISCHARGE PLANNING  Goal: Discharge to home or other facility with appropriate resources  Description  INTERVENTIONS:  - Identify barriers to discharge w/patient and caregiver  - Arrange for needed discharge resources and transportation as appropriate  - Identify discharge learning needs (meds, wound care, etc )  - Arrange for interpretive services to assist at discharge as needed  - Refer to Case Management Department for coordinating discharge planning if the patient needs post-hospital services based on physician/advanced practitioner order or complex needs related to functional status, cognitive ability, or social support system  Outcome: Progressing

## 2020-07-06 NOTE — OB LABOR/OXYTOCIN SAFETY PROGRESS
Oxytocin Safety Progress Check Note - Isabel Bolanos 34 y o  female MRN: [id number]    Unit/Bed#: -01 Encounter: [id number]    Dose (ki-units/min) Oxytocin: 12 ki-units/min  Contraction Frequency (minutes): 1 5-3  Contraction Quality: Mild  Tachysystole: No   Dilation: 3-4        Effacement (%): 50  Station: -3  Baseline Rate: 135 bpm  Fetal Heart Rate: 131 BPM  FHR Category: Category I  Oxytocin Safety Progress Check: Safety check completed          Notes/comments:     FB out  Patient is comfortable, feeling contractions  SVE very difficult, cervix very posterior  Difficult to assess internal os  Continue pitocin titration as tolerated        Deshawn Santiago MD 7/6/2020 3:14 PM

## 2020-07-06 NOTE — PLAN OF CARE
Problem: Potential for Falls  Goal: Patient will remain free of falls  Description  INTERVENTIONS:  - Assess patient frequently for physical needs  -  Identify cognitive and physical deficits and behaviors that affect risk of falls  -  [place] fall precautions as indicated by assessment   - Educate patient/family on patient safety including physical limitations  - Instruct patient to call for assistance with activity based on assessment  - Modify environment to reduce risk of injury  - Consider OT/PT consult to assist with strengthening/mobility  Outcome: Progressing     Problem: Knowledge Deficit  Goal: Verbalizes understanding of labor plan  Description  Assess patient/family/caregiver's baseline knowledge level and ability to understand information  Provide education via patient/family/caregiver's preferred learning method at appropriate level of understanding  1  Provide teaching at level of understanding  2  Provide teaching via preferred learning method(s)  Outcome: Progressing  Goal: Patient/family/caregiver demonstrates understanding of disease process, treatment plan, medications, and discharge instructions  Description  Complete learning assessment and assess knowledge base  Interventions:  - Provide teaching at level of understanding  - Provide teaching via preferred learning methods  Outcome: Progressing     Problem: Labor & Delivery  Goal: Manages discomfort  Description  Assess and monitor for signs and symptoms of discomfort  Assess patient's pain level regularly and per hospital policy  Administer medications as ordered  Support use of nonpharmacological methods to help control pain such as distraction, imagery, relaxation, and application of heat and cold  Collaborate with interdisciplinary team and patient to determine appropriate pain management plan  1  Include patient in decisions related to comfort  2  Offer non-pharmacological pain management interventions    3  Report ineffective pain management to physician  Outcome: Progressing  Goal: Patient vital signs are stable  Description  1  Assess vital signs - vaginal delivery    Outcome: Progressing     Problem: PAIN - ADULT  Goal: Verbalizes/displays adequate comfort level or baseline comfort level  Description  Interventions:  - Encourage patient to monitor pain and request assistance  - Assess pain using appropriate pain scale  - Administer analgesics based on type and severity of pain and evaluate response  - Implement non-pharmacological measures as appropriate and evaluate response  - Consider cultural and social influences on pain and pain management  - Notify physician/advanced practitioner if interventions unsuccessful or patient reports new pain  Outcome: Progressing     Problem: INFECTION - ADULT  Goal: Absence or prevention of progression during hospitalization  Description  INTERVENTIONS:  - Assess and monitor for signs and symptoms of infection  - Monitor lab/diagnostic results  - Monitor all insertion sites, i e  indwelling lines, tubes, and drains  - Monitor endotracheal if appropriate and nasal secretions for changes in amount and color  - [place] appropriate cooling/warming therapies per order  - Administer medications as ordered  - Instruct and encourage patient and family to use good hand hygiene technique  - Identify and instruct in appropriate isolation precautions for identified infection/condition  Outcome: Progressing  Goal: Absence of fever/infection during neutropenic period  Description  INTERVENTIONS:  - Monitor WBC    Outcome: Progressing     Problem: SAFETY ADULT  Goal: Maintain or return to baseline ADL function  Description  INTERVENTIONS:  -  Assess patient's ability to carry out ADLs; assess patient's baseline for ADL function and identify physical deficits which impact ability to perform ADLs (bathing, care of mouth/teeth, toileting, grooming, dressing, etc )  - Assess/evaluate cause of self-care deficits   - Assess range of motion  - Assess patient's mobility; develop plan if impaired  - Assess patient's need for assistive devices and provide as appropriate  - Encourage maximum independence but intervene and supervise when necessary  - Involve family in performance of ADLs  - Assess for home care needs following discharge   - Consider OT consult to assist with ADL evaluation and planning for discharge  - Provide patient education as appropriate  Outcome: Progressing  Goal: Maintain or return mobility status to optimal level  Description  INTERVENTIONS:  - Assess patient's baseline mobility status (ambulation, transfers, stairs, etc )    - Identify cognitive and physical deficits and behaviors that affect mobility  - Identify mobility aids required to assist with transfers and/or ambulation (gait belt, sit-to-stand, lift, walker, cane, etc )  - [place] fall precautions as indicated by assessment  - Record patient progress and toleration of activity level on Mobility SBAR; progress patient to next Phase/Stage  - Instruct patient to call for assistance with activity based on assessment  - Consider rehabilitation consult to assist with strengthening/weightbearing, etc   Outcome: Progressing     Problem: DISCHARGE PLANNING  Goal: Discharge to home or other facility with appropriate resources  Description  INTERVENTIONS:  - Identify barriers to discharge w/patient and caregiver  - Arrange for needed discharge resources and transportation as appropriate  - Identify discharge learning needs (meds, wound care, etc )  - Arrange for interpretive services to assist at discharge as needed  - Refer to Case Management Department for coordinating discharge planning if the patient needs post-hospital services based on physician/advanced practitioner order or complex needs related to functional status, cognitive ability, or social support system  Outcome: Progressing

## 2020-07-06 NOTE — OB LABOR/OXYTOCIN SAFETY PROGRESS
Oxytocin Safety Progress Check Note - Nelson Collaoz 34 y o  female MRN: [id number]    Unit/Bed#: -01 Encounter: [id number]    Dose (ki-units/min) Oxytocin: 16 ki-units/min  Contraction Frequency (minutes): 2-3 5  Contraction Quality: Moderate  Tachysystole: No   Dilation: 3-4        Effacement (%): 50  Station: -3  Baseline Rate: 135 bpm  Fetal Heart Rate: 131 BPM  FHR Category: Category I  Oxytocin Safety Progress Check: Safety check completed          Notes/comments: Exam unchanged since last check  Reconfirmed vertex position via TAUS  Continue to titrate pitocin as tolerated  Plan for patient to walk and/or sit on exercise ball        Esme Berry MD PGY-1 7/6/2020 6:09 PM

## 2020-07-06 NOTE — OB LABOR/OXYTOCIN SAFETY PROGRESS
Oxytocin Safety Progress Check Note - Raman Expose 34 y o  female MRN: [id number]    Unit/Bed#: -01 Encounter: [id number]    Dose (ki-units/min) Oxytocin: 2 ki-units/min  Contraction Frequency (minutes): 2-3  Contraction Quality: Mild      Dilation: Fingertip        Effacement (%): 50  Station: -2  Baseline Rate: 130 bpm  Fetal Heart Rate: (Tracing maternal HR from 0007-0013)  FHR Category: Category I             Notes/comments:     A 24F daugherty with a 30cc balloon was selected  SVE was performed and cervix was located  Daugherty was introduced over sterile gloved hands with the guidance of speculum  Balloon advanced through cervix beyond the internal cervical os  A small amount amount of sterile normal saline solution was instilled in the balloon to confirm placement  Placement was confirmed to be beyond the internal cervical os  A total of 60cc of sterile normal saline solution was instilled into the balloon  Patient tolerated well  Instructions left with RN to place daugherty to gravity with a 1L bag of IV fluid  Notify physician when daugherty dislodged          DO Melissa 7/6/2020 4:06 AM

## 2020-07-06 NOTE — H&P
History & Physical - OB/GYN   Viviana Lima 34 y o  female MRN: [id number]  Unit/Bed#: -01 Encounter: [id number]    34 y o   at 39w3d by ultrasound  She is a patient of Dr Elizabeth Hickey  Chief complaint:  Induction of labor    HPI:  Antoni Lazar is a 33 yo  at 39w3d who presents for elective induction of labor  Contractions:  no  Fetal movement:  yes  Vaginal bleeding:   no  Leaking of fluid:  no      Pregnancy Complications:  Patient Active Problem List   Diagnosis    URI (upper respiratory infection)    Abnormal EKG    39 weeks gestation of pregnancy    Prenatal care, subsequent pregnancy, third trimester    Abnormal GTT (glucose tolerance test)       PMH:  Past Medical History:   Diagnosis Date    Herpes     genital herpes x 1  5 yrs ago -no other outbreaks    Migraine     with previous pregnancy    Miscarriage     chemical pregnancy   5 wks    Varicella     childhood chickenpox       PSH:  Past Surgical History:   Procedure Laterality Date    ADENOIDECTOMY      TONSILLECTOMY  2013    WISDOM TOOTH EXTRACTION         Social Hx:  Negative x3 tobacco/alcohol/substance use in pregnancy    OB Hx:  OB History    Para Term  AB Living   3 1 1 0 1 1   SAB TAB Ectopic Multiple Live Births   1 0 0 0 1      # Outcome Date GA Lbr Daryl/2nd Weight Sex Delivery Anes PTL Lv   3 Current            2 SAB 2019 5w0d          1 Term 04/11/15 39w4d   F Vag-Spont          Meds:  No current facility-administered medications on file prior to encounter  Current Outpatient Medications on File Prior to Encounter   Medication Sig Dispense Refill    Prenatal MV-Min-Fe Fum-FA-DHA (PRENATAL+DHA PO) Take by mouth         Allergies:   Allergies   Allergen Reactions    Sulfa Antibiotics Other (See Comments) and Hives     Rash-as infant       Labs:  Blood type: O+  Antibody: negative  Group B strep: positive  HIV: negative  Hepatitis B: negative  RPR: non-reactive  Rubella: Immune  1 hour Glucose: 168  3 hour Glucose: 122, 116    Physical Exam:  /82   Pulse (!) 106   Temp 98 °F (36 7 °C) (Oral)   LMP 10/03/2019 (Exact Date)   SpO2 98%     Physical Exam   Constitutional: She is oriented to person, place, and time  She appears well-developed and well-nourished  HENT:   Head: Normocephalic and atraumatic  Pulmonary/Chest: Effort normal    Abdominal: Soft  There is no tenderness  Neurological: She is alert and oriented to person, place, and time  Skin: Skin is warm and dry  Presentation: vertex, confirmed by TAUS    SVE: 0 5 / 0% / -2  FHT:  140 / Moderate 6 - 25 bpm / accelerations present, no decelerations  [place]: q2 minutes    Membranes: intact    Assessment:   34 y o   at 39w3d for elective induction of labor  Plan:   1  Admit to L&D  2  CBC, RPR, type and screen  3  GBS positive - PCN for GBS prophylaxis  4  IVF: [email or web address] cc/hr  5  Clear liquid diet    Epidural upon request    Discussed with Dr Linda Bynum

## 2020-07-07 PROBLEM — Z98.891 S/P CESAREAN SECTION: Status: ACTIVE | Noted: 2020-02-19

## 2020-07-07 LAB
BASE EXCESS BLDCOA CALC-SCNC: -5.8 MMOL/L (ref 3–11)
BASE EXCESS BLDCOV CALC-SCNC: -6 MMOL/L (ref 1–9)
HCO3 BLDCOA-SCNC: 18.8 MMOL/L (ref 17.3–27.3)
HCO3 BLDCOV-SCNC: 18.5 MMOL/L (ref 12.2–28.6)
O2 CT VFR BLDCOA CALC: 14.4 ML/DL
OXYHGB MFR BLDCOA: 61.5 %
OXYHGB MFR BLDCOV: 59.9 %
PCO2 BLDCOA: 34.6 MM[HG] (ref 30–60)
PCO2 BLDCOV: 34.1 MM HG (ref 27–43)
PH BLDCOA: 7.35 [PH] (ref 7.23–7.43)
PH BLDCOV: 7.35 [PH] (ref 7.19–7.49)
PO2 BLDCOA: 27.7 MM HG (ref 5–25)
PO2 BLDCOV: 25.6 MM HG (ref 15–45)
SAO2 % BLDCOV: 14.4 ML/DL

## 2020-07-07 PROCEDURE — 10H07YZ INSERTION OF OTHER DEVICE INTO PRODUCTS OF CONCEPTION, VIA NATURAL OR ARTIFICIAL OPENING: ICD-10-PCS | Performed by: STUDENT IN AN ORGANIZED HEALTH CARE EDUCATION/TRAINING PROGRAM

## 2020-07-07 PROCEDURE — 82805 BLOOD GASES W/O2 SATURATION: CPT | Performed by: STUDENT IN AN ORGANIZED HEALTH CARE EDUCATION/TRAINING PROGRAM

## 2020-07-07 PROCEDURE — 59510 CESAREAN DELIVERY: CPT | Performed by: STUDENT IN AN ORGANIZED HEALTH CARE EDUCATION/TRAINING PROGRAM

## 2020-07-07 RX ORDER — METHYLERGONOVINE MALEATE 0.2 MG/ML
INJECTION INTRAVENOUS AS NEEDED
Status: DISCONTINUED | OUTPATIENT
Start: 2020-07-07 | End: 2020-07-07 | Stop reason: SURG

## 2020-07-07 RX ORDER — KETOROLAC TROMETHAMINE 30 MG/ML
INJECTION, SOLUTION INTRAMUSCULAR; INTRAVENOUS AS NEEDED
Status: DISCONTINUED | OUTPATIENT
Start: 2020-07-07 | End: 2020-07-07 | Stop reason: SURG

## 2020-07-07 RX ORDER — CLONIDINE 100 UG/ML
INJECTION, SOLUTION EPIDURAL AS NEEDED
Status: DISCONTINUED | OUTPATIENT
Start: 2020-07-07 | End: 2020-07-07 | Stop reason: SURG

## 2020-07-07 RX ORDER — LIDOCAINE HYDROCHLORIDE AND EPINEPHRINE 20; 5 MG/ML; UG/ML
INJECTION, SOLUTION EPIDURAL; INFILTRATION; INTRACAUDAL; PERINEURAL AS NEEDED
Status: DISCONTINUED | OUTPATIENT
Start: 2020-07-07 | End: 2020-07-07 | Stop reason: SURG

## 2020-07-07 RX ORDER — DOCUSATE SODIUM 100 MG/1
100 CAPSULE, LIQUID FILLED ORAL 2 TIMES DAILY
Status: DISCONTINUED | OUTPATIENT
Start: 2020-07-07 | End: 2020-07-09 | Stop reason: HOSPADM

## 2020-07-07 RX ORDER — OXYCODONE HYDROCHLORIDE 5 MG/1
5 TABLET ORAL EVERY 4 HOURS PRN
Status: DISCONTINUED | OUTPATIENT
Start: 2020-07-07 | End: 2020-07-09 | Stop reason: HOSPADM

## 2020-07-07 RX ORDER — CEFAZOLIN SODIUM 2 G/50ML
2000 SOLUTION INTRAVENOUS ONCE
Status: DISCONTINUED | OUTPATIENT
Start: 2020-07-07 | End: 2020-07-09 | Stop reason: HOSPADM

## 2020-07-07 RX ORDER — ACETAMINOPHEN 325 MG/1
650 TABLET ORAL EVERY 6 HOURS PRN
Status: DISCONTINUED | OUTPATIENT
Start: 2020-07-07 | End: 2020-07-09 | Stop reason: HOSPADM

## 2020-07-07 RX ORDER — ONDANSETRON 2 MG/ML
INJECTION INTRAMUSCULAR; INTRAVENOUS AS NEEDED
Status: DISCONTINUED | OUTPATIENT
Start: 2020-07-07 | End: 2020-07-07 | Stop reason: SURG

## 2020-07-07 RX ORDER — HYDROMORPHONE HCL/PF 1 MG/ML
SYRINGE (ML) INJECTION AS NEEDED
Status: DISCONTINUED | OUTPATIENT
Start: 2020-07-07 | End: 2020-07-07 | Stop reason: SURG

## 2020-07-07 RX ORDER — NALOXONE HYDROCHLORIDE 0.4 MG/ML
0.1 INJECTION, SOLUTION INTRAMUSCULAR; INTRAVENOUS; SUBCUTANEOUS
Status: ACTIVE | OUTPATIENT
Start: 2020-07-07 | End: 2020-07-08

## 2020-07-07 RX ORDER — ONDANSETRON 2 MG/ML
4 INJECTION INTRAMUSCULAR; INTRAVENOUS ONCE AS NEEDED
Status: DISCONTINUED | OUTPATIENT
Start: 2020-07-07 | End: 2020-07-09 | Stop reason: HOSPADM

## 2020-07-07 RX ORDER — DIAPER,BRIEF,INFANT-TODD,DISP
1 EACH MISCELLANEOUS DAILY PRN
Status: DISCONTINUED | OUTPATIENT
Start: 2020-07-07 | End: 2020-07-09 | Stop reason: HOSPADM

## 2020-07-07 RX ORDER — DIPHENHYDRAMINE HYDROCHLORIDE 50 MG/ML
25 INJECTION INTRAMUSCULAR; INTRAVENOUS EVERY 6 HOURS PRN
Status: ACTIVE | OUTPATIENT
Start: 2020-07-07 | End: 2020-07-08

## 2020-07-07 RX ORDER — HYDROMORPHONE HCL/PF 1 MG/ML
0.5 SYRINGE (ML) INJECTION EVERY 2 HOUR PRN
Status: DISCONTINUED | OUTPATIENT
Start: 2020-07-07 | End: 2020-07-09 | Stop reason: HOSPADM

## 2020-07-07 RX ORDER — DIPHENHYDRAMINE HCL 25 MG
25 TABLET ORAL EVERY 6 HOURS PRN
Status: DISCONTINUED | OUTPATIENT
Start: 2020-07-07 | End: 2020-07-09 | Stop reason: HOSPADM

## 2020-07-07 RX ORDER — ACETAMINOPHEN 325 MG/1
650 TABLET ORAL EVERY 6 HOURS
Status: DISCONTINUED | OUTPATIENT
Start: 2020-07-07 | End: 2020-07-09 | Stop reason: HOSPADM

## 2020-07-07 RX ORDER — ONDANSETRON 2 MG/ML
4 INJECTION INTRAMUSCULAR; INTRAVENOUS EVERY 8 HOURS PRN
Status: DISCONTINUED | OUTPATIENT
Start: 2020-07-07 | End: 2020-07-09 | Stop reason: HOSPADM

## 2020-07-07 RX ORDER — LIDOCAINE HYDROCHLORIDE 10 MG/ML
INJECTION, SOLUTION EPIDURAL; INFILTRATION; INTRACAUDAL; PERINEURAL AS NEEDED
Status: DISCONTINUED | OUTPATIENT
Start: 2020-07-07 | End: 2020-07-07 | Stop reason: SURG

## 2020-07-07 RX ORDER — CEFAZOLIN SODIUM 1 G/3ML
INJECTION, POWDER, FOR SOLUTION INTRAMUSCULAR; INTRAVENOUS AS NEEDED
Status: DISCONTINUED | OUTPATIENT
Start: 2020-07-07 | End: 2020-07-07 | Stop reason: SURG

## 2020-07-07 RX ORDER — OXYTOCIN/RINGER'S LACTATE 30/500 ML
PLASTIC BAG, INJECTION (ML) INTRAVENOUS AS NEEDED
Status: DISCONTINUED | OUTPATIENT
Start: 2020-07-07 | End: 2020-07-07 | Stop reason: SURG

## 2020-07-07 RX ORDER — TRANEXAMIC ACID 100 MG/ML
INJECTION, SOLUTION INTRAVENOUS AS NEEDED
Status: DISCONTINUED | OUTPATIENT
Start: 2020-07-07 | End: 2020-07-07 | Stop reason: SURG

## 2020-07-07 RX ORDER — DEXAMETHASONE SODIUM PHOSPHATE 4 MG/ML
INJECTION, SOLUTION INTRA-ARTICULAR; INTRALESIONAL; INTRAMUSCULAR; INTRAVENOUS; SOFT TISSUE AS NEEDED
Status: DISCONTINUED | OUTPATIENT
Start: 2020-07-07 | End: 2020-07-07 | Stop reason: SURG

## 2020-07-07 RX ORDER — METHYLERGONOVINE MALEATE 0.2 MG/ML
0.2 INJECTION INTRAVENOUS ONCE
Status: DISCONTINUED | OUTPATIENT
Start: 2020-07-07 | End: 2020-07-07

## 2020-07-07 RX ORDER — HYDROXYZINE HYDROCHLORIDE 25 MG/1
25 TABLET, FILM COATED ORAL EVERY 6 HOURS PRN
Status: DISCONTINUED | OUTPATIENT
Start: 2020-07-07 | End: 2020-07-09 | Stop reason: HOSPADM

## 2020-07-07 RX ORDER — MEPERIDINE HYDROCHLORIDE 25 MG/ML
12.5 INJECTION INTRAMUSCULAR; INTRAVENOUS; SUBCUTANEOUS
Status: DISCONTINUED | OUTPATIENT
Start: 2020-07-07 | End: 2020-07-09 | Stop reason: HOSPADM

## 2020-07-07 RX ORDER — OXYCODONE HYDROCHLORIDE 10 MG/1
10 TABLET ORAL EVERY 4 HOURS PRN
Status: DISCONTINUED | OUTPATIENT
Start: 2020-07-07 | End: 2020-07-09 | Stop reason: HOSPADM

## 2020-07-07 RX ORDER — FENTANYL CITRATE 50 UG/ML
INJECTION, SOLUTION INTRAMUSCULAR; INTRAVENOUS AS NEEDED
Status: DISCONTINUED | OUTPATIENT
Start: 2020-07-07 | End: 2020-07-07 | Stop reason: SURG

## 2020-07-07 RX ORDER — DIPHENHYDRAMINE HYDROCHLORIDE 50 MG/ML
12.5 INJECTION INTRAMUSCULAR; INTRAVENOUS ONCE AS NEEDED
Status: DISCONTINUED | OUTPATIENT
Start: 2020-07-07 | End: 2020-07-09 | Stop reason: HOSPADM

## 2020-07-07 RX ORDER — FENTANYL CITRATE 50 UG/ML
INJECTION, SOLUTION INTRAMUSCULAR; INTRAVENOUS
Status: COMPLETED
Start: 2020-07-07 | End: 2020-07-07

## 2020-07-07 RX ORDER — METOCLOPRAMIDE HYDROCHLORIDE 5 MG/ML
5 INJECTION INTRAMUSCULAR; INTRAVENOUS EVERY 6 HOURS PRN
Status: ACTIVE | OUTPATIENT
Start: 2020-07-07 | End: 2020-07-08

## 2020-07-07 RX ORDER — CALCIUM CARBONATE 200(500)MG
1000 TABLET,CHEWABLE ORAL DAILY PRN
Status: DISCONTINUED | OUTPATIENT
Start: 2020-07-07 | End: 2020-07-09 | Stop reason: HOSPADM

## 2020-07-07 RX ORDER — DEXAMETHASONE SODIUM PHOSPHATE 10 MG/ML
8 INJECTION, SOLUTION INTRAMUSCULAR; INTRAVENOUS ONCE AS NEEDED
Status: ACTIVE | OUTPATIENT
Start: 2020-07-07 | End: 2020-07-08

## 2020-07-07 RX ORDER — ONDANSETRON 2 MG/ML
4 INJECTION INTRAMUSCULAR; INTRAVENOUS EVERY 4 HOURS PRN
Status: DISPENSED | OUTPATIENT
Start: 2020-07-07 | End: 2020-07-08

## 2020-07-07 RX ORDER — HYDROMORPHONE HCL/PF 1 MG/ML
1 SYRINGE (ML) INJECTION EVERY 2 HOUR PRN
Status: DISCONTINUED | OUTPATIENT
Start: 2020-07-07 | End: 2020-07-09 | Stop reason: HOSPADM

## 2020-07-07 RX ORDER — ACETAMINOPHEN 325 MG/1
650 TABLET ORAL EVERY 4 HOURS PRN
Status: DISCONTINUED | OUTPATIENT
Start: 2020-07-07 | End: 2020-07-09 | Stop reason: HOSPADM

## 2020-07-07 RX ORDER — ROPIVACAINE HYDROCHLORIDE 2 MG/ML
INJECTION, SOLUTION EPIDURAL; INFILTRATION; PERINEURAL AS NEEDED
Status: DISCONTINUED | OUTPATIENT
Start: 2020-07-07 | End: 2020-07-07 | Stop reason: SURG

## 2020-07-07 RX ORDER — KETOROLAC TROMETHAMINE 30 MG/ML
30 INJECTION, SOLUTION INTRAMUSCULAR; INTRAVENOUS EVERY 6 HOURS
Status: DISCONTINUED | OUTPATIENT
Start: 2020-07-08 | End: 2020-07-09

## 2020-07-07 RX ORDER — METOCLOPRAMIDE HYDROCHLORIDE 5 MG/ML
INJECTION INTRAMUSCULAR; INTRAVENOUS AS NEEDED
Status: DISCONTINUED | OUTPATIENT
Start: 2020-07-07 | End: 2020-07-07 | Stop reason: SURG

## 2020-07-07 RX ORDER — FENTANYL CITRATE/PF 50 MCG/ML
25 SYRINGE (ML) INJECTION
Status: DISCONTINUED | OUTPATIENT
Start: 2020-07-07 | End: 2020-07-09 | Stop reason: HOSPADM

## 2020-07-07 RX ORDER — SODIUM CHLORIDE, SODIUM LACTATE, POTASSIUM CHLORIDE, CALCIUM CHLORIDE 600; 310; 30; 20 MG/100ML; MG/100ML; MG/100ML; MG/100ML
125 INJECTION, SOLUTION INTRAVENOUS CONTINUOUS
Status: DISCONTINUED | OUTPATIENT
Start: 2020-07-07 | End: 2020-07-09 | Stop reason: HOSPADM

## 2020-07-07 RX ORDER — IBUPROFEN 600 MG/1
600 TABLET ORAL EVERY 6 HOURS PRN
Status: DISCONTINUED | OUTPATIENT
Start: 2020-07-07 | End: 2020-07-09 | Stop reason: HOSPADM

## 2020-07-07 RX ORDER — HYDROMORPHONE HCL/PF 1 MG/ML
0.2 SYRINGE (ML) INJECTION
Status: DISCONTINUED | OUTPATIENT
Start: 2020-07-07 | End: 2020-07-09 | Stop reason: HOSPADM

## 2020-07-07 RX ORDER — PROMETHAZINE HYDROCHLORIDE 25 MG/ML
12.5 INJECTION, SOLUTION INTRAMUSCULAR; INTRAVENOUS ONCE AS NEEDED
Status: DISCONTINUED | OUTPATIENT
Start: 2020-07-07 | End: 2020-07-09 | Stop reason: HOSPADM

## 2020-07-07 RX ORDER — OXYTOCIN/RINGER'S LACTATE 30/500 ML
62.5 PLASTIC BAG, INJECTION (ML) INTRAVENOUS CONTINUOUS
Status: DISPENSED | OUTPATIENT
Start: 2020-07-07 | End: 2020-07-08

## 2020-07-07 RX ADMIN — HYDROMORPHONE HYDROCHLORIDE 1 MG: 1 INJECTION, SOLUTION INTRAMUSCULAR; INTRAVENOUS; SUBCUTANEOUS at 18:23

## 2020-07-07 RX ADMIN — LIDOCAINE HYDROCHLORIDE AND EPINEPHRINE 5 ML: 20; 5 INJECTION, SOLUTION EPIDURAL; INFILTRATION; INTRACAUDAL; PERINEURAL at 18:04

## 2020-07-07 RX ADMIN — ONDANSETRON 4 MG: 2 INJECTION INTRAMUSCULAR; INTRAVENOUS at 17:08

## 2020-07-07 RX ADMIN — LIDOCAINE HYDROCHLORIDE AND EPINEPHRINE 5 ML: 20; 5 INJECTION, SOLUTION EPIDURAL; INFILTRATION; INTRACAUDAL; PERINEURAL at 17:59

## 2020-07-07 RX ADMIN — CEFAZOLIN SODIUM 2000 MG: 1 INJECTION, POWDER, FOR SOLUTION INTRAMUSCULAR; INTRAVENOUS at 18:07

## 2020-07-07 RX ADMIN — METOCLOPRAMIDE HYDROCHLORIDE 10 MG: 5 INJECTION INTRAMUSCULAR; INTRAVENOUS at 18:29

## 2020-07-07 RX ADMIN — Medication 999 UNITS: at 18:23

## 2020-07-07 RX ADMIN — ROPIVACAINE HYDROCHLORIDE 9 ML: 2 INJECTION, SOLUTION EPIDURAL; INFILTRATION at 09:57

## 2020-07-07 RX ADMIN — SODIUM CHLORIDE 2.5 MILLION UNITS: 9 INJECTION, SOLUTION INTRAVENOUS at 03:18

## 2020-07-07 RX ADMIN — SODIUM CHLORIDE, SODIUM LACTATE, POTASSIUM CHLORIDE, AND CALCIUM CHLORIDE 125 ML/HR: .6; .31; .03; .02 INJECTION, SOLUTION INTRAVENOUS at 19:26

## 2020-07-07 RX ADMIN — SODIUM CHLORIDE, SODIUM LACTATE, POTASSIUM CHLORIDE, AND CALCIUM CHLORIDE 125 ML/HR: .6; .31; .03; .02 INJECTION, SOLUTION INTRAVENOUS at 10:58

## 2020-07-07 RX ADMIN — DEXAMETHASONE SODIUM PHOSPHATE 4 MG: 4 INJECTION, SOLUTION INTRAMUSCULAR; INTRAVENOUS at 18:01

## 2020-07-07 RX ADMIN — ACETAMINOPHEN 650 MG: 325 TABLET, FILM COATED ORAL at 03:31

## 2020-07-07 RX ADMIN — LIDOCAINE HYDROCHLORIDE AND EPINEPHRINE 5 ML: 20; 5 INJECTION, SOLUTION EPIDURAL; INFILTRATION; INTRACAUDAL; PERINEURAL at 17:55

## 2020-07-07 RX ADMIN — SODIUM CHLORIDE, SODIUM LACTATE, POTASSIUM CHLORIDE, AND CALCIUM CHLORIDE 125 ML/HR: .6; .31; .03; .02 INJECTION, SOLUTION INTRAVENOUS at 01:06

## 2020-07-07 RX ADMIN — ONDANSETRON 4 MG: 2 INJECTION INTRAMUSCULAR; INTRAVENOUS at 01:52

## 2020-07-07 RX ADMIN — ROPIVACAINE HYDROCHLORIDE: 2 INJECTION, SOLUTION EPIDURAL; INFILTRATION at 00:06

## 2020-07-07 RX ADMIN — KETOROLAC TROMETHAMINE 30 MG: 30 INJECTION, SOLUTION INTRAMUSCULAR at 18:37

## 2020-07-07 RX ADMIN — ONDANSETRON 4 MG: 2 INJECTION INTRAMUSCULAR; INTRAVENOUS at 18:20

## 2020-07-07 RX ADMIN — ROPIVACAINE HYDROCHLORIDE: 2 INJECTION, SOLUTION EPIDURAL; INFILTRATION at 06:31

## 2020-07-07 RX ADMIN — METHYLERGONOVINE MALEATE 0.2 MG: 0.2 INJECTION, SOLUTION INTRAMUSCULAR; INTRAVENOUS at 18:25

## 2020-07-07 RX ADMIN — TRANEXAMIC ACID 1000 MG: 1 INJECTION, SOLUTION INTRAVENOUS at 18:31

## 2020-07-07 RX ADMIN — SODIUM CHLORIDE 2.5 MILLION UNITS: 9 INJECTION, SOLUTION INTRAVENOUS at 15:35

## 2020-07-07 RX ADMIN — SODIUM CHLORIDE 2.5 MILLION UNITS: 9 INJECTION, SOLUTION INTRAVENOUS at 11:11

## 2020-07-07 RX ADMIN — Medication 26 MILLI-UNITS/MIN: at 14:01

## 2020-07-07 RX ADMIN — SODIUM CHLORIDE, SODIUM LACTATE, POTASSIUM CHLORIDE, AND CALCIUM CHLORIDE: .6; .31; .03; .02 INJECTION, SOLUTION INTRAVENOUS at 18:07

## 2020-07-07 RX ADMIN — Medication 250 UNITS: at 18:35

## 2020-07-07 RX ADMIN — Medication 250 UNITS: at 18:18

## 2020-07-07 RX ADMIN — LIDOCAINE HYDROCHLORIDE 4 ML: 10 INJECTION, SOLUTION EPIDURAL; INFILTRATION; INTRACAUDAL; PERINEURAL at 15:44

## 2020-07-07 RX ADMIN — LIDOCAINE HYDROCHLORIDE AND EPINEPHRINE 5 ML: 20; 5 INJECTION, SOLUTION EPIDURAL; INFILTRATION; INTRACAUDAL; PERINEURAL at 17:51

## 2020-07-07 RX ADMIN — Medication 62.5 MILLI-UNITS/MIN: at 19:51

## 2020-07-07 RX ADMIN — FENTANYL CITRATE 100 MCG: 50 INJECTION INTRAMUSCULAR; INTRAVENOUS at 15:44

## 2020-07-07 RX ADMIN — ROPIVACAINE HYDROCHLORIDE 4 ML: 2 INJECTION, SOLUTION EPIDURAL; INFILTRATION at 15:44

## 2020-07-07 RX ADMIN — Medication 500 MG: at 18:15

## 2020-07-07 RX ADMIN — SODIUM CHLORIDE 2.5 MILLION UNITS: 9 INJECTION, SOLUTION INTRAVENOUS at 07:26

## 2020-07-07 RX ADMIN — CLONIDINE 100 MCG: 100 INJECTION, SOLUTION EPIDURAL at 09:57

## 2020-07-07 RX ADMIN — ROPIVACAINE HYDROCHLORIDE: 2 INJECTION, SOLUTION EPIDURAL; INFILTRATION at 12:29

## 2020-07-07 NOTE — OB LABOR/OXYTOCIN SAFETY PROGRESS
Labor Progress Note - Cyn Perrin 34 y o  female MRN: [id number]    Unit/Bed#: -01 Encounter: [id number]    Dose (ki-units/min) Oxytocin: 0 ki-units/min  Contraction Frequency (minutes): 2  Contraction Quality: Strong  Tachysystole: No   Dilation: 6        Effacement (%): 70  Station: -3  Baseline Rate: 125 bpm  Fetal Heart Rate: 125 BPM  FHR Category: Category I  Oxytocin Safety Progress Check: Safety check completed  Provider Notified: Yes  Provider Name: Avis Yoon    Notes/comments:   Pt sobbing in bed in pain in back, vagina and inner thighs  Hoping this is secondary to being fully dilated  On exam, cervix (likely edematous) 6cm and fetus not well engaged    -discussed unclear picture as I have not been examining patient throughout the day, however likely cervical edema  However, meets criteria for Arrest of Dilation, as she was 7cm at noon today  Discussed safety of proceeding, if patient wishes, given Cat I FHR, but patient requesting  at this time   consent: discussed plan for  delivery  Discussed risks including bleeding, damage to surrounding organs, infection, DVT/PE  Discussed that uterotonics available for management of postpartum bleeding  Also discussed blood transfusion, should the need arise  Discussed that blood transfusions are generally safe in the [place] as blood is cleaned/screened  Discussed risk 1/1 million of HIV/Hepatitis C  Discussed small risk fevers, transfusion reaction, TRALI, both treatable  Discussed risk of infection and preoperative antibiotics  Discussed warning signs of infection postoperatively  Discussed risk of damage to surrounding organs and consultation if necessary  Discussed risk of DVT and use of SCDs          Merced Gonzalez MD 2020 5:47 PM

## 2020-07-07 NOTE — OB LABOR/OXYTOCIN SAFETY PROGRESS
Oxytocin Safety Progress Check Note - Jennifer Freitas 34 y o  female MRN: [id number]    Unit/Bed#: -01 Encounter: [id number]    Dose (ki-units/min) Oxytocin: 22 ki-units/min  Contraction Frequency (minutes): 1 5-3  Contraction Quality: Moderate  Tachysystole: No   Dilation: 3-4        Effacement (%): 60  Station: -3  Baseline Rate: 150 bpm  Fetal Heart Rate: 141 BPM  FHR Category: Category I  Oxytocin Safety Progress Check: Safety check completed          Notes/comments:     Comfortable with epidural  AROM clear fluid  Fetal head well-applied   Recheck cervix in 2 hours       Eleazar Hoskins DO 7/7/2020 1:09 AM

## 2020-07-07 NOTE — ANESTHESIA PREPROCEDURE EVALUATION
Review of Systems/Medical History  Patient summary reviewed  Chart reviewed  No history of anesthetic complications     Cardiovascular  EKG reviewed, Exercise tolerance (METS): >4,     Pulmonary       GI/Hepatic            Endo/Other    Obesity    GYN  Currently pregnant ,          Hematology   Musculoskeletal       Neurology   Psychology           Physical Exam    Airway    Mallampati score: II  TM Distance: >3 FB  Neck ROM: full     Dental   No notable dental hx     Cardiovascular      Pulmonary      Other Findings        Anesthesia Plan  ASA Score- 2     Anesthesia Type- epidural with ASA Monitors  Additional Monitors:   Airway Plan:         Plan Factors-    Induction-     Postoperative Plan-     Informed Consent- Anesthetic plan and risks discussed with patient  I personally reviewed this patient with the CRNA  Discussed and agreed on the Anesthesia Plan with the CRNA  Constance Bejarano

## 2020-07-07 NOTE — OB LABOR/OXYTOCIN SAFETY PROGRESS
Oxytocin Safety Progress Check Note - Penny Robles 34 y o  female MRN: [id number]    Unit/Bed#: -01 Encounter: [id number]    Dose (ki-units/min) Oxytocin: 20 ki-units/min  Contraction Frequency (minutes): 2-3  Contraction Quality: Moderate  Tachysystole: No   Dilation: 5        Effacement (%): 100  Station: -1  Baseline Rate: 125 bpm  Fetal Heart Rate: 122 BPM  FHR Category: Category I  Oxytocin Safety Progress Check: Safety check completed  Provider Notified: Yes  Provider Name: Halle    Notes/comments:   FWB reassuring  No fevers    OP presentation - discussed positioning, continue pitocin    Jackalyn Meigs, MD 7/7/2020 9:19 AM

## 2020-07-07 NOTE — OB LABOR/OXYTOCIN SAFETY PROGRESS
Oxytocin Safety Progress Check Note - Anna Lilly 34 y o  female MRN: [id number]    Unit/Bed#: -01 Encounter: [id number]    Dose (ki-units/min) Oxytocin: 12 ki-units/min(per Dr Lanie John)  Contraction Frequency (minutes): 2-3 5  Contraction Quality: Moderate  Tachysystole: No   Dilation: 4        Effacement (%): 70  Station: -3  Baseline Rate: 125 bpm  Fetal Heart Rate: 125 BPM  FHR Category: Category II  Oxytocin Safety Progress Check: Safety check completed  Provider Notified: Yes  Provider Name: Dr Lanie John    Notes/comments:     Cervical change appreciated  Contractions have spaced out since lower pitocin from 22 to 12 due to tachysystole  Will re-titrate pitocin       Jaspreet Marshall DO 7/7/2020 3:44 AM

## 2020-07-07 NOTE — OB LABOR/OXYTOCIN SAFETY PROGRESS
Oxytocin Safety Progress Check Note - Karoline Valencia 34 y o  female MRN: [id number]    Unit/Bed#: -01 Encounter: [id number]    Dose (ki-units/min) Oxytocin: 26 ki-units/min  Contraction Frequency (minutes): 1 5-3 0  Contraction Quality: Strong  Tachysystole: No   Dilation: 9        Effacement (%): 100  Station: 0  Baseline Rate: 120 bpm  Fetal Heart Rate: 130 BPM  FHR Category: Category I  Oxytocin Safety Progress Check: Safety check completed  Provider Notified: Yes  Provider Name: Halle    Notes/comments:     Feeling more pressure  Cervical exam as above      Matthew Trevino MD 7/7/2020 3:40 PM

## 2020-07-07 NOTE — ANESTHESIA POSTPROCEDURE EVALUATION
Post-Op Assessment Note    CV Status:  Stable  Pain Score: 0    Pain management: adequate     Mental Status:  Alert and awake   Hydration Status:  Stable   PONV Controlled:  None   Airway Patency:  Patent   Post Op Vitals Reviewed: Yes      Staff: CRNA     Post-op block assessment: site cleaned, catheter intact and no complications        BP   151/68 (shivering)   Temp   98 5   Pulse  62   Resp   14   SpO2   100% on RA   Postop VS in PACU noted above, SV non-obstructed  Denies pain or nausea  Able to move LE  Right hand fingers slightly numb; explained to pt can be related to level of epidural + BP cuff on that side and should wear off over next few hours

## 2020-07-07 NOTE — OB LABOR/OXYTOCIN SAFETY PROGRESS
Oxytocin Safety Progress Check Note - Edmond Knight 34 y o  female MRN: [id number]    Unit/Bed#: -01 Encounter: [id number]    Dose (ki-units/min) Oxytocin: 16 ki-units/min  Contraction Frequency (minutes): 1 5-3 5  Contraction Quality: Moderate  Tachysystole: No   Dilation: 4        Effacement (%): 70  Station: -3  Baseline Rate: 125 bpm  Fetal Heart Rate: 110 BPM  FHR Category: Category I  Oxytocin Safety Progress Check: Safety check completed  Provider Notified: Yes  Provider Name: Dr Ricki Amaya    Notes/comments:   Cervix unchanged  IUPC placed, titrate pitocin until contractions adequate    Venus Banuelos DO 7/7/2020 6:22 AM

## 2020-07-07 NOTE — OB LABOR/OXYTOCIN SAFETY PROGRESS
Oxytocin Safety Progress Check Note - Anabel Shaw 34 y o  female MRN: [id number]    Unit/Bed#: -01 Encounter: [id number]    Dose (ki-units/min) Oxytocin: 26 ki-units/min  Contraction Frequency (minutes): 2-3  Contraction Quality: Not applicable  Tachysystole: No   Dilation: 8-9        Effacement (%): 100  Station: 0  Baseline Rate: 120 bpm  Fetal Heart Rate: 120 BPM  FHR Category: Category I  Oxytocin Safety Progress Check: Safety check completed  Provider Notified: Yes  Provider Name: Berna Yoo    Notes/comments:   Pt feeling pressure and contraction pain "in vagina " SVE noted above  Continue pitocin titration  MVUS inadequate, however, patient continues to make cervical change       José Luis Lee MD 7/7/2020 2:29 PM

## 2020-07-07 NOTE — OP NOTE
Operative Report - OB/GYN   Hermina Expose 34 y o  female MRN: [id number]  Unit/Bed#: LD PACU- Encounter: [id number]    Indications: failure to progress: arrest of dilation    Pre-operative Diagnosis:   1  39 week 5 day pregnancy  2  GBS positive status    Post-operative Diagnosis: same, delivered    Surgeon: Michael Leo MD    Assistant(s): Kendall Gates DO    Findings:  1  Delivery of viable male on 20 at 1817, weight 9lbs 10oz; Apgar scores of 9 at one minute and 9 at five minutes  2  Normal appearing placenta with centrally-inserted 3 vessel cord  3  Clear amniotic fluid  4  Grossly normal uterus, tubes, and ovaries    Specimens:   1  Arterial and venous cord gases  2  Cord blood  3  Segment of umbilical cord  4  Placenta to storage     Quantified blood loss (mL): 1198    Drains: Amanda catheter           Complications:  None; patient tolerated the procedure well  Disposition: PACU            Condition: stable    Operative Technique:     Decision was made to proceed with  section due to arrest of dilation  Patient was made aware of these findings and the proposed plan  Risks, benefits, possible complications, alternate treatment options, and expected outcomes were discussed with the patient  The patient agreed with the proposed plan and gave informed consent  The patient was taken to the operating room where she was properly identified to the OR staff and attending physician  She had already received epidural anesthesia during her labor course, which was augmented appropriately preoperatively  Fetal heart tones were appreciated and found to be appropriate  A Amanda catheter and SCDs were in place  The abdomen was prepped with Chloraprep and following appropriate drying time, the patient was draped in the usual sterile manner for a Pfannenstiel incision  The patient had received Ancef 2 g IV, Azithromycin 500 mg IV pre-operatively for prophylaxis    A Time Out was held and the above information confirmed  The patient was identified as Valencia Dietz and the procedure verified as  Delivery  A Pfannenstiel incision was made and carried down through the underlying subcutaneous tissue to the fascia using a scalpel  Rectus fascia was then incised  We then proceeded in Jorge-Uribe fashion  All anatomic layers were well-demarcated  The rectus muscles were  and the peritoneum was identified, entered, and extended longitudinally with blunt dissection  The Federico retractor was inserted  A low transverse uterine incision was made with the scalpel and extended laterally with blunt dissection  The amnion was entered sharply  Surgeons hand was inserted through the hysterotomy and the fetal head was palpated, elevated, and delivered through the uterine incision with the assistance of fundal pressure  Baby had spontaneous cry with good color and tone  The umbilical cord was clamped and cut  The infant was handed off to the  providers  Arterial and venous cord gases, cord blood, and a segment of umbilical cord were obtained for evaluation and promptly sent to the lab  The placenta delivered spontaneously with uterine fundal massage and was noted to have a centrally inserted 3 vessel cord  The uterus was exteriorized and a moist lap sponge was used to clear the cavity of clots and products of conception  The uterine incision was closed with a running locked suture of 0 Vicryl  A second layer of the same suture was used to imbricate the first   Good hemostasis was confirmed upon uterine closure  The posterior culdusac was inspected and all clots and debris were removed  The uterus was returned to the abdomen  The paracolic gutters were inspected and cleared of all clots and debris  The fascia was closed with a running suture of 0 Vicryl  Subcutaneous tissues were closed with 2-0 Plain gut suture   The skin was closed with 4-0 Monocryl in a subcuticular fashion  Sterile dressing was applied and an abdominal binder was then placed  At the conclusion of the procedure, all needle, sponge, and instrument counts were noted to be correct x2  The patient tolerated the procedure well and was transferred to her the recovery room in stable condition  Dr Steven Snell was present and participated in all key portions of the case      Viral Rosenthal DO

## 2020-07-07 NOTE — OB LABOR/OXYTOCIN SAFETY PROGRESS
Oxytocin Safety Progress Check Note - Nelson Collazo 34 y o  female MRN: [id number]    Unit/Bed#: -01 Encounter: [id number]    Dose (ki-units/min) Oxytocin: 26 ki-units/min  Contraction Frequency (minutes): 1 5-3  Contraction Quality: Moderate  Tachysystole: No   Dilation: 7        Effacement (%): 100  Station: -1  Baseline Rate: 130 bpm  Fetal Heart Rate: 130 BPM  FHR Category: Category I  Oxytocin Safety Progress Check: Safety check completed  Provider Notified: Yes  Provider Name: Halle    Notes/comments:     Patient feeling more pressure  Making good cervical change  FHT showed a 2-3 minute prolonged deceleration as she was laying down on her back to drink something  The FHR then returned to baseline with repositioning  Will continue pitocin and monitor closely         Suhail Lofton MD 7/7/2020 12:10 PM

## 2020-07-07 NOTE — OB LABOR/OXYTOCIN SAFETY PROGRESS
Oxytocin Safety Progress Check Note - Rica Berry 34 y o  female MRN: [id number]    Unit/Bed#: -01 Encounter: [id number]    Dose (ki-units/min) Oxytocin: 22 ki-units/min  Contraction Frequency (minutes): 1 5-3  Contraction Quality: Moderate  Tachysystole: No   Dilation: 3-4        Effacement (%): 60  Station: -3  Baseline Rate: 150 bpm  Fetal Heart Rate: 141 BPM  FHR Category: Category I  Oxytocin Safety Progress Check: Safety check completed          Notes/comments:     Fetal head ballotable despite ongoing Pitocin titration  Now agreeable to epidural placement given discomfort with most recent cervical exam  Plan for epidural, reassessment for possible ROM  Plan to place an peanut ball when comfortable with epidural       Fly Gilliland DO 7/6/2020 11:19 PM

## 2020-07-07 NOTE — OB LABOR/OXYTOCIN SAFETY PROGRESS
Oxytocin Safety Progress Check Note - Valencia Dietz 34 y o  female MRN: [id number]    Unit/Bed#: -01 Encounter: [id number]    Dose (ki-units/min) Oxytocin: 22 ki-units/min  Contraction Frequency (minutes): 1 5-3 5  Contraction Quality: Mild  Tachysystole: No   Dilation: 3-4        Effacement (%): 50  Station: -3  Baseline Rate: 150 bpm  Fetal Heart Rate: 155 BPM  FHR Category: Category II  Oxytocin Safety Progress Check: Safety check completed          Notes/comments:     FHT Cat II: intermittent variable decelerations  More uncomfortable with contractions  Continue pitocin at current rate  AROM at next check if head well applied     Points, DO 7/6/2020 9:12 PM

## 2020-07-07 NOTE — PLAN OF CARE
Problem: Knowledge Deficit  Goal: Verbalizes understanding of labor plan  Description  Assess patient/family/caregiver's baseline knowledge level and ability to understand information  Provide education via patient/family/caregiver's preferred learning method at appropriate level of understanding  1  Provide teaching at level of understanding  2  Provide teaching via preferred learning method(s)  Outcome: Completed  Goal: Patient/family/caregiver demonstrates understanding of disease process, treatment plan, medications, and discharge instructions  Description  Complete learning assessment and assess knowledge base  Interventions:  - Provide teaching at level of understanding  - Provide teaching via preferred learning methods  Outcome: Completed     Problem: Labor & Delivery  Goal: Manages discomfort  Description  Assess and monitor for signs and symptoms of discomfort  Assess patient's pain level regularly and per hospital policy  Administer medications as ordered  Support use of nonpharmacological methods to help control pain such as distraction, imagery, relaxation, and application of heat and cold  Collaborate with interdisciplinary team and patient to determine appropriate pain management plan  1  Include patient in decisions related to comfort  2  Offer non-pharmacological pain management interventions  3  Report ineffective pain management to physician  Outcome: Completed  Goal: Patient vital signs are stable  Description  1  Assess vital signs - vaginal delivery    Outcome: Completed

## 2020-07-07 NOTE — OB LABOR/OXYTOCIN SAFETY PROGRESS
Oxytocin Safety Progress Check Note - Ashli Mage 34 y o  female MRN: [id number]    Unit/Bed#: -01 Encounter: [id number]    Dose (ki-units/min) Oxytocin: 16 ki-units/min  Contraction Frequency (minutes): 1-3  Contraction Quality: Moderate  Tachysystole: No   Dilation: 4        Effacement (%): 70  Station: -3  Baseline Rate: 125 bpm  Fetal Heart Rate: 122 BPM  FHR Category: Category II  Oxytocin Safety Progress Check: Safety check completed  Provider Notified: Yes  Provider Name: Dr Paulette Natarajan    Notes/comments:   Exam unchanged  IUPC placed by Dr Paulette Natarajan  Pt frustrated by lack of progress  Counseled about expectations for latent labor  She has been 4/70/-3 since 0340  Recommend continued trial of labor at this point w/ titration of oxytocin to reach adequate MVUs  Pt and her partner expressed understanding        vIana Wright MD 7/7/2020 6:11 AM

## 2020-07-07 NOTE — ANESTHESIA PROCEDURE NOTES
Epidural Block    Patient location during procedure: OB  Start time: 7/6/2020 11:40 PM  Reason for block: at surgeon's request and primary anesthetic  Staffing  Anesthesiologist: Conception Holstein, MD  Resident/CRNA: Ghassan Francois CRNA  Performed: anesthesiologist and resident/CRNA   Preanesthetic Checklist  Completed: patient identified, site marked, surgical consent, pre-op evaluation, timeout performed, IV checked, risks and benefits discussed and monitors and equipment checked  Epidural  Patient position: sitting  Prep: ChloraPrep and site prepped and draped  Patient monitoring: frequent blood pressure checks, continuous pulse ox and heart rate  Approach: midline  Location: lumbar (1-5)  Injection technique: GAURAV saline  Needle  Needle type: Tuohy   Needle gauge: 18 G  Catheter type: side hole  Catheter size: 20 G  Catheter at skin depth: 11 cm  Test dose: negativelidocaine 1 5% with epinephrine 1:200,000 test dose, 3 mL  Assessment  Sensory level: [id number] aspiration for CSF, negative aspiration for heme and no paresthesia on injection  patient tolerated the procedure well with no immediate complications

## 2020-07-08 LAB
ERYTHROCYTE [DISTWIDTH] IN BLOOD BY AUTOMATED COUNT: 14.6 % (ref 11.6–15.1)
HCT VFR BLD AUTO: 32.8 % (ref 34.8–46.1)
HGB BLD-MCNC: 11 G/DL (ref 11.5–15.4)
MCH RBC QN AUTO: 30.6 PG (ref 26.8–34.3)
MCHC RBC AUTO-ENTMCNC: 33.5 G/DL (ref 31.4–37.4)
MCV RBC AUTO: 91 FL (ref 82–98)
PLATELET # BLD AUTO: 173 THOUSANDS/UL (ref 149–390)
PMV BLD AUTO: 10.3 FL (ref 8.9–12.7)
RBC # BLD AUTO: 3.6 MILLION/UL (ref 3.81–5.12)
WBC # BLD AUTO: 21.21 THOUSAND/UL (ref 4.31–10.16)

## 2020-07-08 PROCEDURE — 85027 COMPLETE CBC AUTOMATED: CPT | Performed by: OBSTETRICS & GYNECOLOGY

## 2020-07-08 RX ADMIN — KETOROLAC TROMETHAMINE 30 MG: 30 INJECTION, SOLUTION INTRAMUSCULAR at 19:04

## 2020-07-08 RX ADMIN — KETOROLAC TROMETHAMINE 30 MG: 30 INJECTION, SOLUTION INTRAMUSCULAR at 00:45

## 2020-07-08 RX ADMIN — ACETAMINOPHEN 650 MG: 325 TABLET, FILM COATED ORAL at 15:29

## 2020-07-08 RX ADMIN — ACETAMINOPHEN 650 MG: 325 TABLET, FILM COATED ORAL at 02:34

## 2020-07-08 RX ADMIN — HYDROXYZINE HYDROCHLORIDE 25 MG: 25 TABLET ORAL at 02:09

## 2020-07-08 RX ADMIN — ACETAMINOPHEN 650 MG: 325 TABLET, FILM COATED ORAL at 09:06

## 2020-07-08 RX ADMIN — KETOROLAC TROMETHAMINE 30 MG: 30 INJECTION, SOLUTION INTRAMUSCULAR at 12:47

## 2020-07-08 RX ADMIN — KETOROLAC TROMETHAMINE 30 MG: 30 INJECTION, SOLUTION INTRAMUSCULAR at 06:37

## 2020-07-08 RX ADMIN — ONDANSETRON 4 MG: 2 INJECTION INTRAMUSCULAR; INTRAVENOUS at 01:00

## 2020-07-08 RX ADMIN — ACETAMINOPHEN 650 MG: 325 TABLET, FILM COATED ORAL at 23:10

## 2020-07-08 RX ADMIN — DOCUSATE SODIUM 100 MG: 100 CAPSULE, LIQUID FILLED ORAL at 19:03

## 2020-07-08 RX ADMIN — DOCUSATE SODIUM 100 MG: 100 CAPSULE, LIQUID FILLED ORAL at 09:05

## 2020-07-08 NOTE — UTILIZATION REVIEW
Notification of Maternity/Delivery & [facility] Birth Information for Admission   Notification of Maternity/Delivery for Admission to our facility [facility]  Be advised that this patient was admitted to our facility under Inpatient Status  Contact Lauar Akhtar at [phone] for additional admission information  [facility] PARENT/CHILD HEALTH  DEPT DEDICATED [facility]  [phone]  Mother &  Information   Patient Name: Kate Argueta   YOB: 1990   Delivering clinician: Manuel   OB History        3    Para   2    Term   2            AB   1    Living   2       SAB   1    TAB        Ectopic        Multiple   0    Live Births   2               [facility] Name & MRN:   Information for the patient's :  Esvin Blairt Zhong Blend) [[id number]]      Delivery Information:  Sex: male  Delivered 2020 6:17 PM by , Low Transverse; Gestational Age: 38w11d     Measurements:  Weight: 9 lb 10 oz (4365 g); Height: 21 5"    APGAR 1 minute 5 minutes 10 minutes   Totals: 8 9       Birth Information: 34 y o  female MRN: [id number] Unit/Bed#: -01 Estimated Date of Delivery: 20  Birthweight: No birth weight on file  Gestational Age: <None> Delivery Type: , Low Transverse      Authorization Information   Servicing Facility:   [facility]  Tax ID: [id number]  NPI: [id number] Attending Provider/NPI:  Phone:  Address: Erica Parks [[id number]]  [phone]  Same as Facility   Place of Service Code:  24 Place of Service Name: [address] [place] [address]   Start Date: 20   Discharge Date & Time: No discharge date for patient encounter      Type of Admission: Inpatient Status Discharge Disposition (if discharged): Left against medical advice or discontinued care   Patient Diagnoses:   Delivery of  [O80]  43 weeks gestation of pregnancy [Z3A 39]  The primary encounter diagnosis was 39 weeks gestation of pregnancy  A diagnosis of Failure to progress in labor was also pertinent to this visit  1  39 weeks gestation of pregnancy    2  Failure to progress in labor       Orders: Admission Orders (From admission, onward)     Ordered        07/05/20 2255  Inpatient Admission  Once                    Assigned Utilization Review Contact: Tracie Valladares  Utilization   Network Utilization Review Department  Phone: [phone]; Fax [phone]  Email: [email or web address]

## 2020-07-08 NOTE — PROGRESS NOTES
Progress Note - OB/GYN  Post-Op Check  [facility] 34 y o  MRN: [id number]  Unit/Bed#: -01 Encounter: [id number]      A/P:  Post-Op day # 1 status post Primary low transverse  section  1) Routine Post-op Care: Continue   2) Diet: Advance as tolerated  3) Daugherty: D/c 12 hrs post-op then f/u 1st void  4) UOP adequate  5) Labs: f/u CBC in am  6) DVT ppx: Cont  SCDs  7)  Monitor vital signs    SUBJECTIVE:  Pain: minimal --at incision site only  Tolerating Oral Intake: is tolerating PO liquids and solids  Voiding: daugherty inserted  Flatus: no  Bowel Movement: no  Ambulating: No (daugherty still inserted)  Breastfeeding: Breastfeeding  Chest Pain: no  Shortness of Breath: no  Leg Pain/Discomfort: no  Lochia: minimal    Other:       OBJECTIVE:     Vitals:   Vitals:    20 2002 20 2100 20 2200 20 2300   BP:  128/75 128/79 125/81   BP Location:  Right arm Right arm Right arm   Pulse: 70 72 81 74   Resp:  18 18 18   Temp:  98 4 °F (36 9 °C) 97 7 °F (36 5 °C) 97 9 °F (36 6 °C)   TempSrc:  Axillary Oral Oral   SpO2:  94% 94% 95%   Weight:       Height:           I/O        07 -  0700  07 -  0700    I V  (mL/kg) 2700 (31 5) 1000 (11 7)    Total Intake(mL/kg) 2700 (31 5) 1000 (11 7)    Urine (mL/kg/hr) 475 (0 2) 1775 (0 9)    Emesis/NG output  200    Blood  1198    Total Output 475 3173    Net +2225 -2173                Physical exam:  Physical Exam   Constitutional: She is oriented to person, place, and time  She appears well-developed and well-nourished  Cardiovascular: Normal rate, regular rhythm, normal heart sounds and intact distal pulses  Pulmonary/Chest: Effort normal and breath sounds normal  No stridor  No respiratory distress  Abdominal: Soft  Bowel sounds are normal  She exhibits no distension  There is no tenderness  Incision covered with dressing    Neurological: She is alert and oriented to person, place, and time  Skin: Skin is warm and dry  Psychiatric: She has a normal mood and affect   Her behavior is normal          Results from last 7 days   Lab Units 07/05/20  2340   WBC Thousand/uL 11 15*   HEMOGLOBIN g/dL 14 2   MCV fL 90   PLATELETS Thousands/uL 214                   Invalid input(s): GLU, CA        Invalid input(s): ALP        MEDS:   Current Facility-Administered Medications   Medication Dose Route Frequency    acetaminophen (TYLENOL) tablet 650 mg  650 mg Oral Q6H PRN    acetaminophen (TYLENOL) tablet 650 mg  650 mg Oral Q6H    acetaminophen (TYLENOL) tablet 650 mg  650 mg Oral Q4H PRN    benzocaine-menthol-lanolin-aloe (DERMOPLAST) 20-0 5 % topical spray 1 application  1 application Topical A2G PRN    calcium carbonate (TUMS) chewable tablet 1,000 mg  1,000 mg Oral Daily PRN    ceFAZolin (ANCEF) IVPB (premix) 2,000 mg 50 mL  2,000 mg Intravenous Once    dexamethasone (PF) (DECADRON) injection 8 mg  8 mg Intravenous Once PRN    diphenhydrAMINE (BENADRYL) injection 12 5 mg  12 5 mg Intravenous Once PRN    diphenhydrAMINE (BENADRYL) injection 25 mg  25 mg Intravenous Q6H PRN    diphenhydrAMINE (BENADRYL) tablet 25 mg  25 mg Oral Q6H PRN    docusate sodium (COLACE) capsule 100 mg  100 mg Oral BID    fentaNYL (SUBLIMAZE) injection 25 mcg  25 mcg Intravenous Q3 min PRN    hydrocortisone 1 % cream 1 application  1 application Topical Daily PRN    HYDROmorphone (DILAUDID) injection 0 2 mg  0 2 mg Intravenous Q5 Min PRN    HYDROmorphone (DILAUDID) injection 0 5 mg  0 5 mg Intravenous Q2H PRN    HYDROmorphone (DILAUDID) injection 1 mg  1 mg Intravenous Q2H PRN    hydrOXYzine HCL (ATARAX) tablet 25 mg  25 mg Oral Q6H PRN    ibuprofen (MOTRIN) tablet 600 mg  600 mg Oral Q6H PRN    ketorolac (TORADOL) injection 30 mg  30 mg Intravenous Q6H    lactated ringers infusion  125 mL/hr Intravenous Continuous    meperidine (DEMEROL) injection 12 5 mg  12 5 mg Intravenous Q10 Min PRN    metoclopramide (REGLAN) injection 5 mg  5 mg Intravenous Q6H PRN    naloxone (NARCAN) injection 0 1 mg  0 1 mg Intravenous Q3 min PRN    ondansetron (ZOFRAN) injection 4 mg  4 mg Intravenous Q4H PRN    ondansetron (ZOFRAN) injection 4 mg  4 mg Intravenous Once PRN    ondansetron (ZOFRAN) injection 4 mg  4 mg Intravenous Q8H PRN    oxyCODONE (ROXICODONE) immediate release tablet 10 mg  10 mg Oral Q4H PRN    oxyCODONE (ROXICODONE) IR tablet 5 mg  5 mg Oral Q4H PRN    oxytocin (PITOCIN) 30 Units in lactated ringers 500 mL infusion  62 5 ki-units/min Intravenous Continuous    promethazine (PHENERGAN) injection 12 5 mg  12 5 mg Intravenous Once PRN    ropivacaine 0 2% PCEA   Epidural Continuous    tranexamic acid (CYKLOKAPRON) 1,000 mg in  mL IVPB  1,000 mg Intravenous Once    witch hazel-glycerin (TUCKS) topical pad 1 pad  1 pad Topical Q4H PRN     Invasive Devices     Peripheral Intravenous Line            Peripheral IV 07/05/20 Left Hand 2 days          Drain            Urethral Catheter Double-lumen;Non-latex 16 Fr  less than 1 day              Medication Administration - last 24 hours from 07/07/2020 0013 to 07/08/2020 0013       Date/Time Order Dose Route Action Action by     07/07/2020 1924 lactated ringers infusion 0 mL/hr Intravenous Stopped Yue Brown, NESTOR     07/07/2020 1859 lactated ringers infusion   Intravenous Anesthesia Volume Adjustment Jayla Goodwin, CRNA     07/07/2020 1807 lactated ringers infusion   Intravenous New Bag Jayla Goodwin, CRNA     07/07/2020 1756 lactated ringers infusion   Intravenous Continue from [id number], CRNA     07/07/2020 1058 lactated ringers infusion 125 mL/hr Intravenous New 4951 Gabriel Yates, RN     07/07/2020 1056 lactated ringers infusion 0 mL/hr Intravenous Stopped Chavez Vernon, NESTOR     07/07/2020 0106 lactated ringers infusion 125 mL/hr Intravenous [address] Kelly Myers, NESTOR     07/07/2020 0105 lactated ringers infusion 0 mL/hr Intravenous Stopped Kelly Myers, RN 07/07/2020 1708 ondansetron (ZOFRAN) injection 4 mg 4 mg Intravenous Given Leesa Cristina, RN     07/07/2020 0152 ondansetron (ZOFRAN) injection 4 mg 4 mg Intravenous Given Aquiles Fillers, RN     07/07/2020 1926 penicillin G (PFIZERPEN-G) in 0 9% sodium chloride 100 mL IVPB 2 5 Million Units 2 5 Million Units Intravenous Not Given Rica Davies, RN     07/07/2020 1640 penicillin G (PFIZERPEN-G) in 0 9% sodium chloride 100 mL IVPB 2 5 Million Units 0 Million Units Intravenous Stopped Leesa Cristina, RN     07/07/2020 1535 penicillin G (PFIZERPEN-G) in 0 9% sodium chloride 100 mL IVPB 2 5 Million Units 2 5 Million Units Intravenous New Bag Leesa Yates, RN     07/07/2020 1515 penicillin G (PFIZERPEN-G) in 0 9% sodium chloride 100 mL IVPB 2 5 Million Units   Intravenous Canceled Entry Leesa Cristina, RN     07/07/2020 1215 penicillin G (PFIZERPEN-G) in 0 9% sodium chloride 100 mL IVPB 2 5 Million Units 0 Million Units Intravenous Stopped Leesa Cristina, RN     07/07/2020 1111 penicillin G (PFIZERPEN-G) in 0 9% sodium chloride 100 mL IVPB 2 5 Million Units 2 5 Million Units Intravenous New Bag Leesa Yates, RN     07/07/2020 0830 penicillin G (PFIZERPEN-G) in 0 9% sodium chloride 100 mL IVPB 2 5 Million Units 0 Million Units Intravenous Stopped Leesa Cristina, RN     07/07/2020 5566 penicillin G (PFIZERPEN-G) in 0 9% sodium chloride 100 mL IVPB 2 5 Million Units 2 5 Million Units Intravenous New Bag Leesa Yates, RN     07/07/2020 0511 penicillin G (PFIZERPEN-G) in 0 9% sodium chloride 100 mL IVPB 2 5 Million Units 0 Million Units Intravenous Stopped Aquiles Fillers, RN     07/07/2020 0318 penicillin G (PFIZERPEN-G) in 0 9% sodium chloride 100 mL IVPB 2 5 Million Units 2 5 Million Units Intravenous [address] [place] Fillers, NESTOR     07/07/2020 0015 penicillin G (PFIZERPEN-G) in 0 9% sodium chloride 100 mL IVPB 2 5 Million Units 0 Million Units Intravenous Stopped Rolley Lick Desmond Marvin, RN     07/07/2020 1950 oxytocin (PITOCIN) 30 Units in lactated ringers 500 mL infusion 0 ki-units/min Intravenous Stopped [place]a [place], RN     07/07/2020 1744 oxytocin (PITOCIN) 30 Units in lactated ringers 500 mL infusion 0 ki-units/min Intravenous Stopped MIESHA Douglas, RN     07/07/2020 1628 oxytocin (PITOCIN) 30 Units in lactated ringers 500 mL infusion 28 ki-units/min Intravenous Rate/Dose Change [facility], RN     07/07/2020 1401 oxytocin (PITOCIN) 30 Units in lactated ringers 500 mL infusion 26 ki-units/min Intravenous New [address]1 [address] J[facility], RN     07/07/2020 1400 oxytocin (PITOCIN) 30 Units in lactated ringers 500 mL infusion 0 ki-units/min Intravenous Stopped Leno Cristina, RN     07/07/2020 1144 oxytocin (PITOCIN) 30 Units in lactated ringers 500 mL infusion 26 ki-units/min Intravenous Rate/Dose Change [facility], RN     07/07/2020 1059 oxytocin (PITOCIN) 30 Units in lactated ringers 500 mL infusion 24 ki-units/min Intravenous Rate/Dose Change [facility], RN     07/07/2020 1028 oxytocin (PITOCIN) 30 Units in lactated ringers 500 mL infusion 22 ki-units/min Intravenous Rate/Dose Change [facility], RN     07/07/2020 9283 oxytocin (PITOCIN) 30 Units in lactated ringers 500 mL infusion 20 ki-units/min Intravenous Rate/Dose Change MIESHA [facility], RN     07/07/2020 8098 oxytocin (PITOCIN) 30 Units in lactated ringers 500 mL infusion 18 ki-units/min Intravenous Rate/Dose Change [facility], RN     07/07/2020 9969 oxytocin (PITOCIN) 30 Units in lactated ringers 500 mL infusion 16 ki-units/min Intravenous Rate/Dose Change Jacqueline Varghese RN     07/07/2020 0452 oxytocin (PITOCIN) 30 Units in lactated ringers 500 mL infusion 18 ki-units/min Intravenous Rate/Dose Change Jacqueline Varghese RN     07/07/2020 0421 oxytocin (PITOCIN) 30 Units in lactated ringers 500 mL infusion 16 ki-units/min Intravenous Rate/Dose Change Jacqueline Varghese RN     07/07/2020 0351 oxytocin (PITOCIN) 30 Units in lactated ringers 500 mL infusion 14 ki-units/min Intravenous Rate/Dose Change Jacqueline Varghese RN     07/07/2020 0238 oxytocin (PITOCIN) 30 Units in lactated ringers 500 mL infusion 12 ki-units/min Intravenous Rate/Dose Change Jacqueline Varghese RN     07/07/2020 1745 ropivacaine 0 2% PCEA   Epidural Stopped Rise Ishaan Cadenaacher, RN     07/07/2020 1229 ropivacaine 0 2% PCEA   Epidural New Bag [place] Ishaan Yates, RN     07/07/2020 1227 ropivacaine 0 2% PCEA   Epidural Stopped Rise Ishaan Cadenaacher, RN     07/07/2020 0631 ropivacaine 0 2% PCEA   Epidural New Bag Jacqueline Varghese, RN     07/07/2020 0630 ropivacaine 0 2% PCEA   Epidural Stopped Jacqueline Varghese RN     07/07/2020 0331 acetaminophen (TYLENOL) tablet 650 mg 650 mg Oral Given [place] Every     07/07/2020 1926 ceFAZolin (ANCEF) IVPB (premix) 2,000 mg 50 mL 2,000 mg Intravenous Not Given [place], NESTOR     07/07/2020 1900 acetaminophen (TYLENOL) tablet 650 mg 650 mg Oral Not Given [place], NESTOR     07/07/2020 1951 oxytocin (PITOCIN) 30 Units in lactated ringers 500 mL infusion 62 5 ki-units/min Intravenous [address] Cas[place], NESTOR     07/07/2020 1926 lactated ringers infusion 125 mL/hr Intravenous New 178 Juliann Patten, RN     07/07/2020 2100 docusate sodium (COLACE) capsule 100 mg 100 mg Oral Not Given Conchis Thomas RN     07/07/2020 1927 tranexamic acid (CYKLOKAPRON) 1,000 mg in  mL IVPB 1,000 mg Intravenous Not Given [place]a [place]ona, NESTOR     07/07/2020 2100 methylergonovine (METHERGINE) injection 0 2 mg   Intramuscular Canceled Entry Conchis Thomas RN              Signature / Title: Sumit Manjarrez DO, Resident - Ob/Gyn    Date: 7/8/2020  Time: 12:13 AM

## 2020-07-08 NOTE — PLAN OF CARE
Problem: Potential for Falls  Goal: Patient will remain free of falls  Description  INTERVENTIONS:  - Assess patient frequently for physical needs  -  Identify cognitive and physical deficits and behaviors that affect risk of falls    -  [place] fall precautions as indicated by assessment   - Educate patient/family on patient safety including physical limitations  - Instruct patient to call for assistance with activity based on assessment  - Modify environment to reduce risk of injury  - Consider OT/PT consult to assist with strengthening/mobility  Outcome: Progressing     Problem: PAIN - ADULT  Goal: Verbalizes/displays adequate comfort level or baseline comfort level  Description  Interventions:  - Encourage patient to monitor pain and request assistance  - Assess pain using appropriate pain scale  - Administer analgesics based on type and severity of pain and evaluate response  - Implement non-pharmacological measures as appropriate and evaluate response  - Consider cultural and social influences on pain and pain management  - Notify physician/advanced practitioner if interventions unsuccessful or patient reports new pain  Outcome: Progressing     Problem: INFECTION - ADULT  Goal: Absence or prevention of progression during hospitalization  Description  INTERVENTIONS:  - Assess and monitor for signs and symptoms of infection  - Monitor lab/diagnostic results  - Monitor all insertion sites, i e  indwelling lines, tubes, and drains  - Monitor endotracheal if appropriate and nasal secretions for changes in amount and color  - [place] appropriate cooling/warming therapies per order  - Administer medications as ordered  - Instruct and encourage patient and family to use good hand hygiene technique  - Identify and instruct in appropriate isolation precautions for identified infection/condition  Outcome: Progressing  Goal: Absence of fever/infection during neutropenic period  Description  INTERVENTIONS:  - Monitor WBC    Outcome: Progressing     Problem: SAFETY ADULT  Goal: Maintain or return to baseline ADL function  Description  INTERVENTIONS:  -  Assess patient's ability to carry out ADLs; assess patient's baseline for ADL function and identify physical deficits which impact ability to perform ADLs (bathing, care of mouth/teeth, toileting, grooming, dressing, etc )  - Assess/evaluate cause of self-care deficits   - Assess range of motion  - Assess patient's mobility; develop plan if impaired  - Assess patient's need for assistive devices and provide as appropriate  - Encourage maximum independence but intervene and supervise when necessary  - Involve family in performance of ADLs  - Assess for home care needs following discharge   - Consider OT consult to assist with ADL evaluation and planning for discharge  - Provide patient education as appropriate  Outcome: Progressing  Goal: Maintain or return mobility status to optimal level  Description  INTERVENTIONS:  - Assess patient's baseline mobility status (ambulation, transfers, stairs, etc )    - Identify cognitive and physical deficits and behaviors that affect mobility  - Identify mobility aids required to assist with transfers and/or ambulation (gait belt, sit-to-stand, lift, walker, cane, etc )  - [place] fall precautions as indicated by assessment  - Record patient progress and toleration of activity level on Mobility SBAR; progress patient to next Phase/Stage  - Instruct patient to call for assistance with activity based on assessment  - Consider rehabilitation consult to assist with strengthening/weightbearing, etc   Outcome: Progressing     Problem: DISCHARGE PLANNING  Goal: Discharge to home or other facility with appropriate resources  Description  INTERVENTIONS:  - Identify barriers to discharge w/patient and caregiver  - Arrange for needed discharge resources and transportation as appropriate  - Identify discharge learning needs (meds, wound care, etc )  - Arrange for interpretive services to assist at discharge as needed  - Refer to Case Management Department for coordinating discharge planning if the patient needs post-hospital services based on physician/advanced practitioner order or complex needs related to functional status, cognitive ability, or social support system  Outcome: Progressing     Problem: ALTERATION IN THE BREAST  Goal: Optimize infant feeding at the breast  Description  INTERVENTIONS:  - Latch, breast and nipple assessment  - Assess prior breast feeding history  - Hand expression of breast milk  - For cracked, bleeding and or sore nipples reassess latch, treat damaged nipple  -Educate mother on feeding cues  -Positioning/latch techniques  Outcome: Progressing     Problem: INADEQUATE LATCH, SUCK OR SWALLOW  Goal: Demonstrate ability to latch and sustain latch, audible swallowing and satiety  Description  INTERVENTIONS:  - Assess oral anatomy, notify Physician/AP for abnormal findings  - Establish milk expression  - Maximize feeding opportunity (skin to skin, behavioral state)  - Position/latch techniques  - Discourage use of pacifier-artificial nipple  - Mechanical pumping  - Nipple Shield  - Supplemental formula feeding (Physician/AP order)  - Alternative feeding method  Outcome: Progressing     Problem: POSTPARTUM  Goal: Experiences normal postpartum course  Description  INTERVENTIONS:  - Monitor maternal vital signs  - Assess uterine involution and lochia  Outcome: Progressing  Goal: Appropriate maternal -  bonding  Description  INTERVENTIONS:  - Identify family support  - Assess for appropriate maternal/infant bonding   -Encourage maternal/infant bonding opportunities  - Referral to  or  as needed  Outcome: Progressing  Goal: Establishment of infant feeding pattern  Description  INTERVENTIONS:  - Assess breast/bottle feeding  - Refer to lactation as needed  Outcome: Progressing  Goal: Incision(s), wounds(s) or drain site(s) healing without S/S of infection  Description  INTERVENTIONS  - Assess and document risk factors for skin impairment   - Assess and document dressing, incision, wound bed, drain sites and surrounding tissue  - Consider nutrition services referral as needed  - Oral mucous membranes remain intact  - Provide patient/ family education  Outcome: Progressing

## 2020-07-08 NOTE — LACTATION NOTE
This note was copied from a baby's chart  Information on hand expression given  Discussed benefits of knowing how to manually express breast including stimulating milk supply, softening nipple for latch and evacuating breast in the event of engorgement  Worked on positioning infant up at chest level and starting to feed infant with nose arriving at the nipple  Then, using areolar compression to achieve a deep latch that is comfortable and exchanges optimum amounts of milk  Deep latch and stimulate till suckling well on right breast using football hold  Burped baby  Used football hold again on left breast  Baby engaged quickly  Mom and dad noted better suckling at the breast     Encouraged parents to call for assistance, questions, and concerns about breastfeeding  Extension provided

## 2020-07-08 NOTE — PLAN OF CARE
Problem: Potential for Falls  Goal: Patient will remain free of falls  Description  INTERVENTIONS:  - Assess patient frequently for physical needs  -  Identify cognitive and physical deficits and behaviors that affect risk of falls    -  [place] fall precautions as indicated by assessment   - Educate patient/family on patient safety including physical limitations  - Instruct patient to call for assistance with activity based on assessment  - Modify environment to reduce risk of injury  - Consider OT/PT consult to assist with strengthening/mobility  Outcome: Progressing     Problem: PAIN - ADULT  Goal: Verbalizes/displays adequate comfort level or baseline comfort level  Description  Interventions:  - Encourage patient to monitor pain and request assistance  - Assess pain using appropriate pain scale  - Administer analgesics based on type and severity of pain and evaluate response  - Implement non-pharmacological measures as appropriate and evaluate response  - Consider cultural and social influences on pain and pain management  - Notify physician/advanced practitioner if interventions unsuccessful or patient reports new pain  Outcome: Progressing     Problem: INFECTION - ADULT  Goal: Absence or prevention of progression during hospitalization  Description  INTERVENTIONS:  - Assess and monitor for signs and symptoms of infection  - Monitor lab/diagnostic results  - Monitor all insertion sites, i e  indwelling lines, tubes, and drains  - Monitor endotracheal if appropriate and nasal secretions for changes in amount and color  - [place] appropriate cooling/warming therapies per order  - Administer medications as ordered  - Instruct and encourage patient and family to use good hand hygiene technique  - Identify and instruct in appropriate isolation precautions for identified infection/condition  Outcome: Progressing  Goal: Absence of fever/infection during neutropenic period  Description  INTERVENTIONS:  - Monitor WBC    Outcome: Progressing     Problem: SAFETY ADULT  Goal: Maintain or return to baseline ADL function  Description  INTERVENTIONS:  -  Assess patient's ability to carry out ADLs; assess patient's baseline for ADL function and identify physical deficits which impact ability to perform ADLs (bathing, care of mouth/teeth, toileting, grooming, dressing, etc )  - Assess/evaluate cause of self-care deficits   - Assess range of motion  - Assess patient's mobility; develop plan if impaired  - Assess patient's need for assistive devices and provide as appropriate  - Encourage maximum independence but intervene and supervise when necessary  - Involve family in performance of ADLs  - Assess for home care needs following discharge   - Consider OT consult to assist with ADL evaluation and planning for discharge  - Provide patient education as appropriate  Outcome: Progressing  Goal: Maintain or return mobility status to optimal level  Description  INTERVENTIONS:  - Assess patient's baseline mobility status (ambulation, transfers, stairs, etc )    - Identify cognitive and physical deficits and behaviors that affect mobility  - Identify mobility aids required to assist with transfers and/or ambulation (gait belt, sit-to-stand, lift, walker, cane, etc )  - [place] fall precautions as indicated by assessment  - Record patient progress and toleration of activity level on Mobility SBAR; progress patient to next Phase/Stage  - Instruct patient to call for assistance with activity based on assessment  - Consider rehabilitation consult to assist with strengthening/weightbearing, etc   Outcome: Progressing     Problem: DISCHARGE PLANNING  Goal: Discharge to home or other facility with appropriate resources  Description  INTERVENTIONS:  - Identify barriers to discharge w/patient and caregiver  - Arrange for needed discharge resources and transportation as appropriate  - Identify discharge learning needs (meds, wound care, etc )  - Arrange for interpretive services to assist at discharge as needed  - Refer to Case Management Department for coordinating discharge planning if the patient needs post-hospital services based on physician/advanced practitioner order or complex needs related to functional status, cognitive ability, or social support system  Outcome: Progressing     Problem: ALTERATION IN THE BREAST  Goal: Optimize infant feeding at the breast  Description  INTERVENTIONS:  - Latch, breast and nipple assessment  - Assess prior breast feeding history  - Hand expression of breast milk  - For cracked, bleeding and or sore nipples reassess latch, treat damaged nipple  -Educate mother on feeding cues  -Positioning/latch techniques  Outcome: Progressing     Problem: INADEQUATE LATCH, SUCK OR SWALLOW  Goal: Demonstrate ability to latch and sustain latch, audible swallowing and satiety  Description  INTERVENTIONS:  - Assess oral anatomy, notify Physician/AP for abnormal findings  - Establish milk expression  - Maximize feeding opportunity (skin to skin, behavioral state)  - Position/latch techniques  - Discourage use of pacifier-artificial nipple  - Mechanical pumping  - Nipple Shield  - Supplemental formula feeding (Physician/AP order)  - Alternative feeding method  Outcome: Progressing     Problem: POSTPARTUM  Goal: Experiences normal postpartum course  Description  INTERVENTIONS:  - Monitor maternal vital signs  - Assess uterine involution and lochia  Outcome: Progressing  Goal: Appropriate maternal -  bonding  Description  INTERVENTIONS:  - Identify family support  - Assess for appropriate maternal/infant bonding   -Encourage maternal/infant bonding opportunities  - Referral to  or  as needed  Outcome: Progressing  Goal: Establishment of infant feeding pattern  Description  INTERVENTIONS:  - Assess breast/bottle feeding  - Refer to lactation as needed  Outcome: Progressing  Goal: Incision(s), wounds(s) or drain site(s) healing without S/S of infection  Description  INTERVENTIONS  - Assess and document risk factors for skin impairment   - Assess and document dressing, incision, wound bed, drain sites and surrounding tissue  - Consider nutrition services referral as needed  - Oral mucous membranes remain intact  - Provide patient/ family education  Outcome: Progressing

## 2020-07-08 NOTE — LACTATION NOTE
This note was copied from a baby's chart  CONSULT - LACTATION  Baby Boy Demetria Sorenson Watty 1 days male MRN: [id number]    [facility] NURSERY Room / Bed: (N)/(N) Encounter: [id number]    Maternal Information     MOTHER:  Andrea Saleem  Maternal Age: 34 y o    OB History: #: 1, Date: 04/11/15, Sex: Female, Weight: None, GA: 39w4d, Delivery: Vaginal, Spontaneous, Apgar1: None, Apgar5: None, Living: None, Birth Comments: None    #: 2, Date: 2019, Sex: None, Weight: None, GA: 5w0d, Delivery: None, Apgar1: None, Apgar5: None, Living: None, Birth Comments: None    #: 3, Date: 20, Sex: Male, Weight: 4365 g (9 lb 10 oz), GA: 39w5d, Delivery: , Low Transverse, Apgar1: 8, Apgar5: 9, Living: Living, Birth Comments: None   Previouse breast reduction surgery? No    Lactation history:   Has patient previously breast fed: Yes   How long had patient previously breast fed: a few weeks with supplementation   Previous breast feeding complications: Breast/nipple pain, Low milk supply(difficulty latching baby)     Past Surgical History:   Procedure Laterality Date    ADENOIDECTOMY      OR  DELIVERY ONLY N/A 2020    Procedure:  SECTION (); Surgeon: Ty Maldonado MD;  Location: [facility] ;  Service: Obstetrics    TONSILLECTOMY  2013    WISDOM TOOTH EXTRACTION         Birth information:  YOB: 2020   Time of birth: 6:17 PM   Sex: male   Delivery type: , Low Transverse   Birth Weight: 4365 g (9 lb 10 oz)   Percent of Weight Change: 0%     Gestational Age: 38w11d   [unfilled]    Assessment     Breast and nipple assessment: denies need for assistance at this time    [place] Assessment: baby sleeping at this time    Feeding assessment: baby sleeping at this time  LATCH:  Latch:      Audible Swallowing:     Type of Nipple:     Comfort (Breast/Nipple):     Hold (Positioning):     LATCH Score:            Feeding recommendations: breast feed on demand     Met with mother and father  Provided mother with Ready, Set, Baby booklet  Discussed Skin to Skin contact an benefits to mom and baby  Talked about the delay of the first bath until baby has adjusted  Spoke about the benefits of rooming in  Feeding on cue and what that means for recognizing infant's hunger  Avoidance of pacifiers for the first month discussed  Talked about exclusive breastfeeding for the first 6 months  Positioning and latch reviewed as well as showing images of other feeding positions  Discussed the properties of a good latch in any position  Reviewed hand/manual expression  Discussed s/s that baby is getting enough milk and some s/s that breastfeeding dyad may need further help  Gave information on common concerns, what to expect the first few weeks after delivery, preparing for other caregivers, and how partners can help  Resources for support also provided  Dad at bedside  Discussed risks for early supplementation: over feeding, longer digestion times, engorgement for mom, lower milk supply for mom, and nipple confusion  Benefits of breast feeding for infant's intestinal tract, less engorgement for mom, protection from multiple disease processes as infant develops, avoidance of over feeding for infant, less nipple confusion, and increased health benefits for mom  Encouraged parents to call for assistance, questions, and concerns about breastfeeding  Extension provided      Alea Hooks RN 7/8/2020 12:56 PM

## 2020-07-08 NOTE — OP NOTE
OPERATIVE REPORT  PATIENT NAME: Fco Jeronimo    :  1990  MRN: [id number]  Pt Location: [facility] L&D OR ROOM 01    SURGERY DATE: 2020    Surgeon(s) and Role:     * Lorelei Paniagua MD - Primary     * Kendall Gates DO - Assisting    Preop Diagnosis:  Arrest of Dilation    Post-Op Diagnosis Codes:     * Failure to progress in labor [O62 2]   Intraop Hemorrhage   OP presentation   Large for Gestational Age    Procedure(s) (LRB):   SECTION () (N/A)    Specimen(s):  ID Type Source Tests Collected by Time Destination   A :  Tissue (Placenta on Hold) OB Only Placenta PLACENTA IN STORAGE Lorelei Paniagua MD 2020    B :  Cord Blood Cord BLOOD GAS, VENOUS, CORD, BLOOD GAS, ARTERIAL, CORD Michael Leo MD 2020        Estimated Blood Loss: 1198mL      Drains:  Urethral Catheter Double-lumen;Non-latex 16 Fr  (Active)   Goal for Removal Remove POD#1 2020  5:30 PM   Site Assessment Clean;Skin intact 2020  8:30 PM   Collection Container Standard drainage bag 2020  5:30 PM   Securement Method Securing device (Describe) 2020  5:30 PM   Output (mL) 700 mL 2020  8:30 PM   Number of days: 0       Anesthesia Type:   Spinal    Findings:  1  Delivery of viable male on 20 at 6:17pm, weight 9lbs 10oz; Apgar scores of 8 at one minute and 9 at five minutes  Umbilical artery pH  Results from last 7 days   Lab Units 20  1818   PH COA  7 352   PCO2 COA  34 6   PO2 COA mm HG 27 7*   HCO3 COA mmol/L 18 8   BASE EXC COA mmol/L -5 8*   O2 CONTENT CORD ART ml/dl 14 4   O2 HGB, ARTERIAL CORD % 61 5     Venous pH  Results from last 7 days   Lab Units 20  1818   PH COV  7 352   PCO2 COV mm HG 34 1   HCO3 COV mmol/L 18 5   BASE EXC COV mmol/L -6 0*   O2 CT CD VB mL/dL 14 4   O2 HGB, VENOUS CORD % 59 9     2  Atonic uterus following delivery  3  Normal appearing placenta with centrally-inserted 3 vessel cord  4  Clear amniotic fluid  5   Grossly normal uterus, tubes, and ovaries    Specimens:   1  Arterial and venous cord gases  2  Cord blood  3  Segment of umbilical cord  4  Placenta to storage     Estimated Blood Loss:  1182 mL           Drains: Amanda catheter           Complications:  Postpartum hemorrhage           Disposition: PACU  and hemodynamically stable           Condition: stable    Procedure Details   Decision was made to proceed with  section due to Arrest of Dilation  Patient was made aware of these findings and the proposed plan  Risks, benefits, possible complications, alternate treatment options, and expected outcomes were discussed with the patient as documented elsewhere  The patient agreed with the proposed plan and gave informed consent  The patient was taken to the operating room where she was properly identified to the OR staff and attending physician  She had already received epidural anesthesia during her labor course, which was augmented appropriately  Fetal heart tones were appreciated and found to be appropriate  A Amanda catheter and SCDs were in place  The vagina was prepped with betadine and abdomen was prepped with Chloraprep  Following appropriate drying time, the patient was draped in the usual sterile manner for a Pfannenstiel incision  The patient received Ancef 2g and azithromycin 500mg IV pre-operatively for prophylaxis  A Time Out was held and the above information confirmed  The patient was identified as Raman Expose and the procedure verified as  Delivery  A Pfannenstiel incision was made and carried down through the underlying subcutaneous tissue to the fascia using a scalpel  Rectus fascia was then incised in the midline and extended bluntly, with entry completed in the [place] fashion  Federico retractor was inserted after visualization of the bowel    A low transverse uterine incision was made with the scalpel and extended in a cephalocaudad fashion, bluntly  The amnion was entered sharply    Surgeons hand was inserted through the hysterotomy and the fetal head was notably Occiput Posterior  The head was palpated, elevated, and delivered through the uterine incision with the assistance of fundal pressure  Baby had spontaneous cry with good color and tone  The umbilical cord was clamped and cut  The infant was handed off to the  providers  Arterial and venous cord gases, cord blood, and a segment of umbilical cord were obtained for evaluation and promptly sent to the lab  The placenta expressed and manually removed; noted to have a centrally inserted 3 vessel cord  This was sent to storage  The uterus was exteriorized and a moist lap sponge was used to clear the cavity of clots and products of conception  The uterine incision was closed with a running locked suture of 0 Vicryl  A second layer of the same suture was used to imbricate the first   Good hemostasis was confirmed upon uterine closure  The paracolic gutters were inspected and cleared of all clots and debris with irrigation and moist lap sponges  The fascia was closed with a running suture of 0 Vicryl  Subcutaneous tissues were closed with 2-0 plain gut suture  The skin was closed with 4-0 Monocryl in a subcuticular fashion  Sterile dressing was applied and an abdominal binder was then placed  At the conclusion of the procedure, all needle, sponge, and instrument counts were noted to be correct x2  The patient tolerated the procedure well and was transferred to her the recovery room in stable condition       Augustin Brown MD      I was present for the entire procedure    SIGNATURE: Josy Phipps MD  DATE: 2020  TIME: 9:39 PM

## 2020-07-09 ENCOUNTER — TELEPHONE (OUTPATIENT)
Dept: OBGYN CLINIC | Facility: CLINIC | Age: 30
End: 2020-07-09

## 2020-07-09 ENCOUNTER — TRANSITIONAL CARE MANAGEMENT (OUTPATIENT)
Dept: INTERNAL MEDICINE CLINIC | Facility: CLINIC | Age: 30
End: 2020-07-09

## 2020-07-09 VITALS
SYSTOLIC BLOOD PRESSURE: 133 MMHG | RESPIRATION RATE: 18 BRPM | BODY MASS INDEX: 34.78 KG/M2 | HEIGHT: 62 IN | HEART RATE: 90 BPM | OXYGEN SATURATION: 98 % | WEIGHT: 189 LBS | DIASTOLIC BLOOD PRESSURE: 85 MMHG | TEMPERATURE: 98.6 F

## 2020-07-09 PROCEDURE — 99024 POSTOP FOLLOW-UP VISIT: CPT | Performed by: OBSTETRICS & GYNECOLOGY

## 2020-07-09 RX ORDER — DOCUSATE SODIUM 100 MG/1
100 CAPSULE, LIQUID FILLED ORAL 2 TIMES DAILY
Qty: 10 CAPSULE | Refills: 0
Start: 2020-07-09 | End: 2020-07-31 | Stop reason: ALTCHOICE

## 2020-07-09 RX ORDER — IBUPROFEN 600 MG/1
600 TABLET ORAL EVERY 6 HOURS PRN
Qty: 30 TABLET | Refills: 0
Start: 2020-07-09 | End: 2020-07-31 | Stop reason: ALTCHOICE

## 2020-07-09 RX ORDER — ACETAMINOPHEN 325 MG/1
650 TABLET ORAL EVERY 6 HOURS PRN
Qty: 30 TABLET | Refills: 0
Start: 2020-07-09 | End: 2020-07-31 | Stop reason: ALTCHOICE

## 2020-07-09 RX ADMIN — IBUPROFEN 600 MG: 600 TABLET ORAL at 03:57

## 2020-07-09 RX ADMIN — ACETAMINOPHEN 650 MG: 325 TABLET, FILM COATED ORAL at 09:25

## 2020-07-09 RX ADMIN — DOCUSATE SODIUM 100 MG: 100 CAPSULE, LIQUID FILLED ORAL at 09:17

## 2020-07-09 NOTE — TELEPHONE ENCOUNTER
----- Message from Lesley Isabel sent at 2020  2:44 PM EDT -----  Regarding: Pre-Op/Post-Op Question  Contact: [phone]  Hi Dr Ashley Russell,    We are finally discharged from the hospital and home! I ended up delivering Roldan Tuesday night with a   He ended up being 9lb 10oz! My , Yesenia Franco, is going to take FMLA time off of work to help me at home  Would it be possible for you to write a letter stating this? I understand that you're probably swamped right now but if this could be done in a timely manner due to Dimitri's work protocol that would be greatly appreciated! Can the letter be faxed to : [address] [phone]  Please state the delivery date in the letter of 2020 and that he Yesenia Franco) is taking time off of work to take care of his wife after a  delivery  Excepted return to work date: 2020    If you could send me a message stating that this is complete or if someone from the office can call me when this is complete that would be wonderful! Thank you so much for everything!     [facility]

## 2020-07-09 NOTE — PROGRESS NOTES
Progress Note - OB/GYN   Nelson Collazo 34 y o  female MRN: [id number]  Unit/Bed#:  318-01 Encounter: [id number]    Assessment:  POD#2 s/p primary low transverse  section, stable    Plan:  Routine postoperative care  Hgb 11 0 g/dL --> pending  Encourage ambulation  Encourage breastfeeding  Amanda catheter   - Removed yesterday am   - Making adequate uine output  Bandage was taken off, she plans a shower today      Subjective/Objective   Chief Complaint:     POD#2 s/p primary low transverse  section    Subjective:     Pain: no  Tolerating PO: yes  Voiding: yes  Flatus: yes  BM: no  Ambulating: yes  Breastfeeding: Breastfeeding  Chest pain: no  Shortness of breath: no  Leg pain: no    Objective:     Vitals:  Vitals:    20 1629 20 2036 20 2329 20 0419   BP: 109/70 120/64 112/67 123/82   BP Location: Left arm      Pulse: 87 80 89 78   Resp: 18 18 16 16   Temp: 98 1 °F (36 7 °C) 97 9 °F (36 6 °C) 98 1 °F (36 7 °C) 97 9 °F (36 6 °C)   TempSrc: Oral Oral Oral Oral   SpO2: 98%      Weight:       Height:           Physical Exam:   Uterine fundus firm and non-tender, -1 cm below the umbilicus   GEN:appears well, alert and oriented x 3, pleasant and cooperative   CV: RRR, no m/r/g  Lungs: CTA b/l, no w/r/r  ABDOMEN: soft, moderate tenderness throughout abdomen,   Uterine fundus firm and non-tender, -1 cm below the umbilicus, Incision C/D/I  EXTREMITIES: non-tender, no edema       Lab, Imaging and other studies: I have personally reviewed pertinent reports        Lab Results   Component Value Date    WBC 21 21 (H) 2020    HGB 11 0 (L) 2020    HCT 32 8 (L) 2020    MCV 91 2020     2020               Mamadou Villalobos DO, PGY-1  Obstetrics & Gynecology  20  6:35 AM

## 2020-07-09 NOTE — PLAN OF CARE
Problem: Potential for Falls  Goal: Patient will remain free of falls  Description  INTERVENTIONS:  - Assess patient frequently for physical needs  -  Identify cognitive and physical deficits and behaviors that affect risk of falls    -  [place] fall precautions as indicated by assessment   - Educate patient/family on patient safety including physical limitations  - Instruct patient to call for assistance with activity based on assessment  - Modify environment to reduce risk of injury  - Consider OT/PT consult to assist with strengthening/mobility  Outcome: Completed     Problem: PAIN - ADULT  Goal: Verbalizes/displays adequate comfort level or baseline comfort level  Description  Interventions:  - Encourage patient to monitor pain and request assistance  - Assess pain using appropriate pain scale  - Administer analgesics based on type and severity of pain and evaluate response  - Implement non-pharmacological measures as appropriate and evaluate response  - Consider cultural and social influences on pain and pain management  - Notify physician/advanced practitioner if interventions unsuccessful or patient reports new pain  Outcome: Completed     Problem: INFECTION - ADULT  Goal: Absence or prevention of progression during hospitalization  Description  INTERVENTIONS:  - Assess and monitor for signs and symptoms of infection  - Monitor lab/diagnostic results  - Monitor all insertion sites, i e  indwelling lines, tubes, and drains  - Monitor endotracheal if appropriate and nasal secretions for changes in amount and color  - [place] appropriate cooling/warming therapies per order  - Administer medications as ordered  - Instruct and encourage patient and family to use good hand hygiene technique  - Identify and instruct in appropriate isolation precautions for identified infection/condition  Outcome: Completed  Goal: Absence of fever/infection during neutropenic period  Description  INTERVENTIONS:  - Monitor WBC    Outcome: Completed     Problem: SAFETY ADULT  Goal: Maintain or return to baseline ADL function  Description  INTERVENTIONS:  -  Assess patient's ability to carry out ADLs; assess patient's baseline for ADL function and identify physical deficits which impact ability to perform ADLs (bathing, care of mouth/teeth, toileting, grooming, dressing, etc )  - Assess/evaluate cause of self-care deficits   - Assess range of motion  - Assess patient's mobility; develop plan if impaired  - Assess patient's need for assistive devices and provide as appropriate  - Encourage maximum independence but intervene and supervise when necessary  - Involve family in performance of ADLs  - Assess for home care needs following discharge   - Consider OT consult to assist with ADL evaluation and planning for discharge  - Provide patient education as appropriate  Outcome: Completed  Goal: Maintain or return mobility status to optimal level  Description  INTERVENTIONS:  - Assess patient's baseline mobility status (ambulation, transfers, stairs, etc )    - Identify cognitive and physical deficits and behaviors that affect mobility  - Identify mobility aids required to assist with transfers and/or ambulation (gait belt, sit-to-stand, lift, walker, cane, etc )  - [place] fall precautions as indicated by assessment  - Record patient progress and toleration of activity level on Mobility SBAR; progress patient to next Phase/Stage  - Instruct patient to call for assistance with activity based on assessment  - Consider rehabilitation consult to assist with strengthening/weightbearing, etc   Outcome: Completed     Problem: DISCHARGE PLANNING  Goal: Discharge to home or other facility with appropriate resources  Description  INTERVENTIONS:  - Identify barriers to discharge w/patient and caregiver  - Arrange for needed discharge resources and transportation as appropriate  - Identify discharge learning needs (meds, wound care, etc )  - Arrange for interpretive services to assist at discharge as needed  - Refer to Case Management Department for coordinating discharge planning if the patient needs post-hospital services based on physician/advanced practitioner order or complex needs related to functional status, cognitive ability, or social support system  Outcome: Completed     Problem: ALTERATION IN THE BREAST  Goal: Optimize infant feeding at the breast  Description  INTERVENTIONS:  - Latch, breast and nipple assessment  - Assess prior breast feeding history  - Hand expression of breast milk  - For cracked, bleeding and or sore nipples reassess latch, treat damaged nipple  -Educate mother on feeding cues  -Positioning/latch techniques  Outcome: Completed     Problem: INADEQUATE LATCH, SUCK OR SWALLOW  Goal: Demonstrate ability to latch and sustain latch, audible swallowing and satiety  Description  INTERVENTIONS:  - Assess oral anatomy, notify Physician/AP for abnormal findings  - Establish milk expression  - Maximize feeding opportunity (skin to skin, behavioral state)  - Position/latch techniques  - Discourage use of pacifier-artificial nipple  - Mechanical pumping  - Nipple Shield  - Supplemental formula feeding (Physician/AP order)  - Alternative feeding method  Outcome: Completed     Problem: POSTPARTUM  Goal: Experiences normal postpartum course  Description  INTERVENTIONS:  - Monitor maternal vital signs  - Assess uterine involution and lochia  Outcome: Completed  Goal: Appropriate maternal -  bonding  Description  INTERVENTIONS:  - Identify family support  - Assess for appropriate maternal/infant bonding   -Encourage maternal/infant bonding opportunities  - Referral to  or  as needed  Outcome: Completed  Goal: Establishment of infant feeding pattern  Description  INTERVENTIONS:  - Assess breast/bottle feeding  - Refer to lactation as needed  Outcome: Completed  Goal: Incision(s), wounds(s) or drain site(s) healing without S/S of infection  Description  INTERVENTIONS  - Assess and document risk factors for skin impairment   - Assess and document dressing, incision, wound bed, drain sites and surrounding tissue  - Consider nutrition services referral as needed  - Oral mucous membranes remain intact  - Provide patient/ family education  Outcome: Completed

## 2020-07-09 NOTE — PLAN OF CARE
Problem: Potential for Falls  Goal: Patient will remain free of falls  Description  INTERVENTIONS:  - Assess patient frequently for physical needs  -  Identify cognitive and physical deficits and behaviors that affect risk of falls    -  [place] fall precautions as indicated by assessment   - Educate patient/family on patient safety including physical limitations  - Instruct patient to call for assistance with activity based on assessment  - Modify environment to reduce risk of injury  - Consider OT/PT consult to assist with strengthening/mobility  Outcome: Progressing     Problem: PAIN - ADULT  Goal: Verbalizes/displays adequate comfort level or baseline comfort level  Description  Interventions:  - Encourage patient to monitor pain and request assistance  - Assess pain using appropriate pain scale  - Administer analgesics based on type and severity of pain and evaluate response  - Implement non-pharmacological measures as appropriate and evaluate response  - Consider cultural and social influences on pain and pain management  - Notify physician/advanced practitioner if interventions unsuccessful or patient reports new pain  Outcome: Progressing     Problem: INFECTION - ADULT  Goal: Absence or prevention of progression during hospitalization  Description  INTERVENTIONS:  - Assess and monitor for signs and symptoms of infection  - Monitor lab/diagnostic results  - Monitor all insertion sites, i e  indwelling lines, tubes, and drains  - Monitor endotracheal if appropriate and nasal secretions for changes in amount and color  - [place] appropriate cooling/warming therapies per order  - Administer medications as ordered  - Instruct and encourage patient and family to use good hand hygiene technique  - Identify and instruct in appropriate isolation precautions for identified infection/condition  Outcome: Progressing  Goal: Absence of fever/infection during neutropenic period  Description  INTERVENTIONS:  - Monitor WBC    Outcome: Progressing     Problem: SAFETY ADULT  Goal: Maintain or return to baseline ADL function  Description  INTERVENTIONS:  -  Assess patient's ability to carry out ADLs; assess patient's baseline for ADL function and identify physical deficits which impact ability to perform ADLs (bathing, care of mouth/teeth, toileting, grooming, dressing, etc )  - Assess/evaluate cause of self-care deficits   - Assess range of motion  - Assess patient's mobility; develop plan if impaired  - Assess patient's need for assistive devices and provide as appropriate  - Encourage maximum independence but intervene and supervise when necessary  - Involve family in performance of ADLs  - Assess for home care needs following discharge   - Consider OT consult to assist with ADL evaluation and planning for discharge  - Provide patient education as appropriate  Outcome: Progressing  Goal: Maintain or return mobility status to optimal level  Description  INTERVENTIONS:  - Assess patient's baseline mobility status (ambulation, transfers, stairs, etc )    - Identify cognitive and physical deficits and behaviors that affect mobility  - Identify mobility aids required to assist with transfers and/or ambulation (gait belt, sit-to-stand, lift, walker, cane, etc )  - [place] fall precautions as indicated by assessment  - Record patient progress and toleration of activity level on Mobility SBAR; progress patient to next Phase/Stage  - Instruct patient to call for assistance with activity based on assessment  - Consider rehabilitation consult to assist with strengthening/weightbearing, etc   Outcome: Progressing     Problem: DISCHARGE PLANNING  Goal: Discharge to home or other facility with appropriate resources  Description  INTERVENTIONS:  - Identify barriers to discharge w/patient and caregiver  - Arrange for needed discharge resources and transportation as appropriate  - Identify discharge learning needs (meds, wound care, etc )  - Arrange for interpretive services to assist at discharge as needed  - Refer to Case Management Department for coordinating discharge planning if the patient needs post-hospital services based on physician/advanced practitioner order or complex needs related to functional status, cognitive ability, or social support system  Outcome: Progressing     Problem: ALTERATION IN THE BREAST  Goal: Optimize infant feeding at the breast  Description  INTERVENTIONS:  - Latch, breast and nipple assessment  - Assess prior breast feeding history  - Hand expression of breast milk  - For cracked, bleeding and or sore nipples reassess latch, treat damaged nipple  -Educate mother on feeding cues  -Positioning/latch techniques  Outcome: Progressing     Problem: INADEQUATE LATCH, SUCK OR SWALLOW  Goal: Demonstrate ability to latch and sustain latch, audible swallowing and satiety  Description  INTERVENTIONS:  - Assess oral anatomy, notify Physician/AP for abnormal findings  - Establish milk expression  - Maximize feeding opportunity (skin to skin, behavioral state)  - Position/latch techniques  - Discourage use of pacifier-artificial nipple  - Mechanical pumping  - Nipple Shield  - Supplemental formula feeding (Physician/AP order)  - Alternative feeding method  Outcome: Progressing     Problem: POSTPARTUM  Goal: Experiences normal postpartum course  Description  INTERVENTIONS:  - Monitor maternal vital signs  - Assess uterine involution and lochia  Outcome: Progressing  Goal: Appropriate maternal -  bonding  Description  INTERVENTIONS:  - Identify family support  - Assess for appropriate maternal/infant bonding   -Encourage maternal/infant bonding opportunities  - Referral to  or  as needed  Outcome: Progressing  Goal: Establishment of infant feeding pattern  Description  INTERVENTIONS:  - Assess breast/bottle feeding  - Refer to lactation as needed  Outcome: Progressing  Goal: Incision(s), wounds(s) or drain site(s) healing without S/S of infection  Description  INTERVENTIONS  - Assess and document risk factors for skin impairment   - Assess and document dressing, incision, wound bed, drain sites and surrounding tissue  - Consider nutrition services referral as needed  - Oral mucous membranes remain intact  - Provide patient/ family education  Outcome: Progressing

## 2020-07-10 NOTE — UTILIZATION REVIEW
CS Discharge Summary - Aung Bachelor 34 y o  female MRN: [id number]    Unit/Bed#: -01 Encounter: [id number]    Admission Date: 2020     Discharge Date:2020    Admitting Diagnosis:   Patient Active Problem List   Diagnosis    URI (upper respiratory infection)    Abnormal EKG    S/P  section    Prenatal care, subsequent pregnancy, third trimester    Abnormal GTT (glucose tolerance test)         Discharge Diagnosis:   Same, delivered  Intraoperative hemorrhage [id number]  HGB 14 2 --> 11 0      Procedures:   primary  section, low transverse incision for arrest of dilation      Admitting Attending: Dr Norma Echeverria  Delivery Attending: Dr Norma Echeverria  Discharge Attending: Dr Sharmaine Reaves service: none  Consult attending: none    Hospital Course:      Mushtaq Lofton is a 33 yo  at 39w3d who was admitted for elective induction of labor  she had failure to progress in labor and was sectioned for arrest of dilation  She then underwent an uncomplicated  section delivery and delivered a viable male  at 36  APGARS were 8, 9 at 1 and 5 minutes, respectively   weighed 9lb 10oz   was then transferred to  nursery  Patient tolerated the procedure well and was transferred to recovery in stable condition  The patient's post partum course was unremarkable    Preoperative hemaglobin was 14 2, postoperative was 11  Her postoperative pain was well controlled with oral analgesics  On day of discharge, she was ambulating and able to reasonably perform all ADLs  She was voiding and had appropriate bowel function  Pain was well controlled  She was discharged home on post-operative day #2 without complications  Patient was instructed to follow up with her OB as an outpatient and was given appropriate warnings to call provider if she develops signs of infection or uncontrolled pain  She is breast feeding     Mom's blood type is O positive, RhoGAM is not indicated while baby's is O positive  Rhogam was not given  Complications:   None    Condition at discharge:   good     Provisions for Follow-Up Care:  See after visit summary for information related to follow-up care and any pertinent home health orders  Disposition:   See After Visit Summary for discharge disposition information  Planned Readmission:   No    Discharge Medications:   Prenatal vitamin daily for 6 months or the duration of nursing whichever is longer  Motrin 600 mg orally every 6 hours as needed for pain  Tylenol (over the counter) per bottle directions as needed for pain  Hydrocortisone cream 1% (over the counter) applied 1-2x daily to hemorrhoids as needed  Witch hazel pads for hemorrhoidal discomfort as needed      Discharge instructions :   -Do not place anything (no partner, tampons or douche) in your vagina for 6 weeks  -You may walk for exercise for the first 6 weeks then gradually return to your usual activities    -Please do not drive for 1 week if you have no stitches and for 2 weeks if you have stitches or underwent a  delivery     -You may take baths or shower per your preference    -Please look at your bust (breasts) in the mirror daily and call provider for redness or tenderness or increased warmth  - If you have had a  please look at your incision daily as well and call provider for increasing redness or steady drainage from the incision    -Please call your provider if temperature > 100 4*F or 38* C, worsening pain or a foul discharge      Thelma Florian DO  Obstetrics & Gynecology PGY-1  7/10/2020  12:16 PM

## 2020-07-11 ENCOUNTER — TELEPHONE (OUTPATIENT)
Dept: OTHER | Facility: OTHER | Age: 30
End: 2020-07-11

## 2020-07-11 ENCOUNTER — DOCUMENTATION (OUTPATIENT)
Dept: LABOR AND DELIVERY | Facility: HOSPITAL | Age: 30
End: 2020-07-11

## 2020-07-12 NOTE — TELEPHONE ENCOUNTER
[phone]/HXWELUCY Isabel  89/Since Pt's  discharge on Thurs her feet are completely swollen and has a lot of Adema  Dr Dacia Cameron was paged at [phone] [place] to call back in 20-30 mins if they did not hear back from the provider

## 2020-07-14 LAB — PLACENTA IN STORAGE: NORMAL

## 2020-07-23 ENCOUNTER — TELEPHONE (OUTPATIENT)
Dept: OBGYN CLINIC | Facility: CLINIC | Age: 30
End: 2020-07-23

## 2020-07-24 ENCOUNTER — TELEPHONE (OUTPATIENT)
Dept: OBGYN CLINIC | Facility: CLINIC | Age: 30
End: 2020-07-24

## 2020-07-24 DIAGNOSIS — B96.89 BV (BACTERIAL VAGINOSIS): Primary | ICD-10-CM

## 2020-07-24 DIAGNOSIS — N76.0 BV (BACTERIAL VAGINOSIS): Primary | ICD-10-CM

## 2020-07-24 RX ORDER — METRONIDAZOLE 500 MG/1
500 TABLET ORAL EVERY 12 HOURS SCHEDULED
Qty: 14 TABLET | Refills: 0 | Status: SHIPPED | OUTPATIENT
Start: 2020-07-24 | End: 2020-07-31

## 2020-07-24 NOTE — TELEPHONE ENCOUNTER
Patient calling with symptoms of BC  Delivered 7/7  Reports tanish discharge and pungent odor, some irritation  Has had this before and thinks may be BV  Not breastfeeding  Allergic to Sulfa antibiotics  Rtd to CS to advise

## 2020-07-28 NOTE — TELEPHONE ENCOUNTER
Pt called, never picked up rx, started feeling better, will follow up with Dr Yancey President 7/31/20

## 2020-07-31 ENCOUNTER — POSTPARTUM VISIT (OUTPATIENT)
Dept: OBGYN CLINIC | Facility: CLINIC | Age: 30
End: 2020-07-31

## 2020-07-31 VITALS
SYSTOLIC BLOOD PRESSURE: 102 MMHG | WEIGHT: 160.4 LBS | TEMPERATURE: 97.5 F | DIASTOLIC BLOOD PRESSURE: 62 MMHG | BODY MASS INDEX: 29.34 KG/M2

## 2020-07-31 DIAGNOSIS — Z98.891 S/P CESAREAN SECTION: ICD-10-CM

## 2020-07-31 DIAGNOSIS — Z30.011 ENCOUNTER FOR INITIAL PRESCRIPTION OF CONTRACEPTIVE PILLS: ICD-10-CM

## 2020-07-31 DIAGNOSIS — Z09 POSTOP CHECK: Primary | ICD-10-CM

## 2020-07-31 PROCEDURE — 99024 POSTOP FOLLOW-UP VISIT: CPT | Performed by: OBSTETRICS & GYNECOLOGY

## 2020-07-31 RX ORDER — NORGESTIMATE AND ETHINYL ESTRADIOL 7DAYSX3 LO
1 KIT ORAL DAILY
Qty: 84 TABLET | Refills: 3 | Status: SHIPPED | OUTPATIENT
Start: 2020-07-31 | End: 2021-07-12 | Stop reason: SDUPTHER

## 2020-12-01 ENCOUNTER — ANNUAL EXAM (OUTPATIENT)
Dept: OBGYN CLINIC | Facility: CLINIC | Age: 30
End: 2020-12-01
Payer: COMMERCIAL

## 2020-12-01 VITALS
DIASTOLIC BLOOD PRESSURE: 72 MMHG | HEIGHT: 63 IN | BODY MASS INDEX: 28.84 KG/M2 | WEIGHT: 162.8 LBS | SYSTOLIC BLOOD PRESSURE: 112 MMHG

## 2020-12-01 DIAGNOSIS — Z11.51 SCREENING FOR HUMAN PAPILLOMAVIRUS (HPV): ICD-10-CM

## 2020-12-01 DIAGNOSIS — Z30.41 ENCOUNTER FOR SURVEILLANCE OF CONTRACEPTIVE PILLS: ICD-10-CM

## 2020-12-01 DIAGNOSIS — Z01.419 ENCOUNTER FOR GYNECOLOGICAL EXAMINATION (GENERAL) (ROUTINE) WITHOUT ABNORMAL FINDINGS: Primary | ICD-10-CM

## 2020-12-01 PROBLEM — Z98.891 S/P CESAREAN SECTION: Status: RESOLVED | Noted: 2020-02-19 | Resolved: 2020-12-01

## 2020-12-01 PROBLEM — Z34.83 PRENATAL CARE, SUBSEQUENT PREGNANCY, THIRD TRIMESTER: Status: RESOLVED | Noted: 2020-03-23 | Resolved: 2020-12-01

## 2020-12-01 PROBLEM — R73.09 ABNORMAL GTT (GLUCOSE TOLERANCE TEST): Status: RESOLVED | Noted: 2020-04-09 | Resolved: 2020-12-01

## 2020-12-01 PROCEDURE — G0124 SCREEN C/V THIN LAYER BY MD: HCPCS | Performed by: PATHOLOGY

## 2020-12-01 PROCEDURE — 99395 PREV VISIT EST AGE 18-39: CPT | Performed by: OBSTETRICS & GYNECOLOGY

## 2020-12-01 PROCEDURE — G0145 SCR C/V CYTO,THINLAYER,RESCR: HCPCS | Performed by: PATHOLOGY

## 2020-12-01 PROCEDURE — 87624 HPV HI-RISK TYP POOLED RSLT: CPT | Performed by: OBSTETRICS & GYNECOLOGY

## 2020-12-03 LAB
HPV HR 12 DNA CVX QL NAA+PROBE: NEGATIVE
HPV16 DNA CVX QL NAA+PROBE: NEGATIVE
HPV18 DNA CVX QL NAA+PROBE: NEGATIVE

## 2020-12-09 LAB
LAB AP GYN PRIMARY INTERPRETATION: ABNORMAL
LAB AP LMP: ABNORMAL
Lab: ABNORMAL
PATH INTERP SPEC-IMP: ABNORMAL

## 2020-12-10 ENCOUNTER — TELEPHONE (OUTPATIENT)
Dept: OBGYN CLINIC | Facility: CLINIC | Age: 30
End: 2020-12-10

## 2021-01-12 ENCOUNTER — TELEMEDICINE (OUTPATIENT)
Dept: INTERNAL MEDICINE CLINIC | Facility: CLINIC | Age: 31
End: 2021-01-12
Payer: OTHER MISCELLANEOUS

## 2021-01-12 VITALS — WEIGHT: 162 LBS | TEMPERATURE: 98.5 F | BODY MASS INDEX: 28.93 KG/M2

## 2021-01-12 DIAGNOSIS — R43.2 LOSS OF TASTE: Primary | ICD-10-CM

## 2021-01-12 DIAGNOSIS — R43.2 LOSS OF TASTE: ICD-10-CM

## 2021-01-12 PROCEDURE — U0005 INFEC AGEN DETEC AMPLI PROBE: HCPCS | Performed by: INTERNAL MEDICINE

## 2021-01-12 PROCEDURE — 99214 OFFICE O/P EST MOD 30 MIN: CPT | Performed by: INTERNAL MEDICINE

## 2021-01-12 PROCEDURE — U0003 INFECTIOUS AGENT DETECTION BY NUCLEIC ACID (DNA OR RNA); SEVERE ACUTE RESPIRATORY SYNDROME CORONAVIRUS 2 (SARS-COV-2) (CORONAVIRUS DISEASE [COVID-19]), AMPLIFIED PROBE TECHNIQUE, MAKING USE OF HIGH THROUGHPUT TECHNOLOGIES AS DESCRIBED BY CMS-2020-01-R: HCPCS | Performed by: INTERNAL MEDICINE

## 2021-01-12 NOTE — ASSESSMENT & PLAN NOTE
Discussed with patient high probability of COVID  I recommend isolating for 10 days from onset of symptoms, will also check COVID test   Patient instructed to reach out if she were to develop shortness of breath    Patient referred to this [facility] web site for details on how to best isolation, and when isolation can be discontinued

## 2021-01-12 NOTE — PATIENT INSTRUCTIONS
Problem List Items Addressed This Visit        Other    Loss of taste - Primary     Discussed with patient high probability of COVID  I recommend isolating for 10 days from onset of symptoms, will also check COVID test   Patient instructed to reach out if she were to develop shortness of breath    Patient referred to this [facility] web site for details on how to best isolation, and when isolation can be discontinued         Relevant Orders    Novel Coronavirus (COVID-19), PCR [facility] - Collected at   [facility] [address] or [facility]

## 2021-01-12 NOTE — PROGRESS NOTES
COVID-19 Virtual Visit     Assessment/Plan:    Problem List Items Addressed This Visit        Other    Loss of taste - Primary     Discussed with patient high probability of COVID  I recommend isolating for 10 days from onset of symptoms, will also check COVID test   Patient instructed to reach out if she were to develop shortness of breath  Patient referred to this [facility] web site for details on how to best isolation, and when isolation can be discontinued         Relevant Orders    Novel Coronavirus (COVID-19), PCR [facility] - Collected at [facility] or [facility]         Disposition:     I have spent 15 minutes directly with the patient  Greater than 50% of this time was spent in counseling/coordination of care regarding: prognosis, instructions for management, patient and family education and impressions  Encounter provider Rojas Moseley MD    Provider located at [address] [place] [id number]    Recent Visits  No visits were found meeting these conditions  Showing recent visits within past 7 days and meeting all other requirements     Today's Visits  Date Type Provider Dept   01/12/21 Telemedicine Rojas Moseley MD [id number] today's visits and meeting all other requirements     Future Appointments  No visits were found meeting these conditions  Showing future appointments within next 150 days and meeting all other requirements    BMI Counseling: Body mass index is 28 93 kg/m²  The BMI is above normal  Nutrition recommendations include encouraging healthy choices of fruits and vegetables and moderation in carbohydrate intake  Exercise recommendations include exercising 3-5 times per week  Patient agrees to participate in a virtual check in via telephone or video visit instead of presenting to the office to address urgent/immediate medical needs  Patient is aware this is a billable service      After connecting through [facility], the patient was identified by name and date of birth  Loki Whitfield was informed that this was a telemedicine visit and that the exam was being conducted confidentially over secure lines  My office door was closed  No one else was in the room  Loki Whitfield acknowledged consent and understanding of privacy and security of the telemedicine visit  I informed the patient that I have reviewed her record in Epic and presented the opportunity for her to ask any questions regarding the visit today  The patient agreed to participate  Subjective:   Loki Whitfield is a 27 y o  female who is concerned about COVID-19  Patient's symptoms include fatigue, malaise, nasal congestion, rhinorrhea, anosmia, loss of taste, myalgias and headache  Patient denies fever, chills, sore throat, cough, shortness of breath, chest tightness, abdominal pain, nausea, vomiting and diarrhea  Date of symptom onset: 2021    Exposure:   Contact with a person who is under investigation (PUI) for or who is positive for COVID-19 within the last 14 days?: No    No results found for: Megan Carpenter  Past Medical History:   Diagnosis Date    Herpes     genital herpes x 1  5 yrs ago -no other outbreaks    Migraine     with previous pregnancy    Miscarriage     chemical pregnancy   5 wks    S/P  section 2020    Varicella     childhood chickenpox     Past Surgical History:   Procedure Laterality Date    ADENOIDECTOMY      SD  DELIVERY ONLY N/A 2020    Procedure:  SECTION ();   Surgeon: Juan Snow MD;  Location: [facility] ;  Service: Obstetrics    TONSILLECTOMY  2013    WISDOM TOOTH EXTRACTION       Current Outpatient Medications   Medication Sig Dispense Refill    norgestimate-ethinyl estradiol (ORTHO TRI-CYCLEN LO) 0 18/0 215/0 25 MG-25 MCG per tablet Take 1 tablet by mouth daily 84 tablet 3    Prenatal MV-Min-Fe Fum-FA-DHA (PRENATAL+DHA PO) Take by mouth       No current facility-administered medications for this visit  Allergies   Allergen Reactions    Sulfa Antibiotics Other (See Comments) and Hives     Rash-as infant       Review of Systems   Constitutional: Positive for fatigue  Negative for chills and fever  HENT: Positive for congestion and rhinorrhea  Negative for sore throat  Respiratory: Negative for cough, chest tightness and shortness of breath  Gastrointestinal: Negative for abdominal pain, diarrhea, nausea and vomiting  Musculoskeletal: Positive for myalgias  Neurological: Positive for headaches  Objective:    Vitals:    01/12/21 0912   Temp: 98 5 °F (36 9 °C)   TempSrc: Temporal   Weight: 73 5 kg (162 lb)       Physical Exam  VIRTUAL VISIT DISCLAIMER    Renee Chaudhary acknowledges that she has consented to an online visit or consultation  She understands that the online visit is based solely on information provided by her, and that, in the absence of a face-to-face physical evaluation by the physician, the diagnosis she receives is both limited and provisional in terms of accuracy and completeness  This is not intended to replace a full medical face-to-face evaluation by the physician  Renee Chaudhary understands and accepts these terms

## 2021-01-13 LAB — SARS-COV-2 RNA SPEC QL NAA+PROBE: DETECTED

## 2021-01-21 ENCOUNTER — TELEPHONE (OUTPATIENT)
Dept: INTERNAL MEDICINE CLINIC | Facility: CLINIC | Age: 31
End: 2021-01-21

## 2021-01-21 NOTE — TELEPHONE ENCOUNTER
Pt needs a work note entered to return to work tomorrow, pt will get letter from [employer]

## 2021-04-27 ENCOUNTER — TRANSCRIBE ORDERS (OUTPATIENT)
Dept: LAB | Facility: HOSPITAL | Age: 31
End: 2021-04-27

## 2021-04-27 ENCOUNTER — APPOINTMENT (OUTPATIENT)
Dept: LAB | Facility: HOSPITAL | Age: 31
End: 2021-04-27

## 2021-04-27 DIAGNOSIS — Z00.8 HEALTH EXAMINATION IN POPULATION SURVEY: ICD-10-CM

## 2021-04-27 DIAGNOSIS — Z00.8 HEALTH EXAMINATION IN POPULATION SURVEY: Primary | ICD-10-CM

## 2021-04-27 LAB
CHOLEST SERPL-MCNC: 176 MG/DL (ref 50–200)
EST. AVERAGE GLUCOSE BLD GHB EST-MCNC: 94 MG/DL
HBA1C MFR BLD: 4.9 %
HDLC SERPL-MCNC: 53 MG/DL
LDLC SERPL CALC-MCNC: 98 MG/DL (ref 0–100)
NONHDLC SERPL-MCNC: 123 MG/DL
TRIGL SERPL-MCNC: 125 MG/DL

## 2021-04-27 PROCEDURE — 80061 LIPID PANEL: CPT

## 2021-04-27 PROCEDURE — 36415 COLL VENOUS BLD VENIPUNCTURE: CPT

## 2021-04-27 PROCEDURE — 83036 HEMOGLOBIN GLYCOSYLATED A1C: CPT

## 2021-07-12 DIAGNOSIS — Z30.011 ENCOUNTER FOR INITIAL PRESCRIPTION OF CONTRACEPTIVE PILLS: ICD-10-CM

## 2021-07-12 RX ORDER — NORGESTIMATE AND ETHINYL ESTRADIOL 7DAYSX3 LO
1 KIT ORAL DAILY
Qty: 84 TABLET | Refills: 1 | Status: SHIPPED | OUTPATIENT
Start: 2021-07-12 | End: 2021-12-27 | Stop reason: SDUPTHER

## 2021-07-30 ENCOUNTER — TELEPHONE (OUTPATIENT)
Dept: OBGYN CLINIC | Facility: CLINIC | Age: 31
End: 2021-07-30

## 2021-07-30 NOTE — TELEPHONE ENCOUNTER
Returned pts' p c -  Pt states she is taking her OCP's everyday ; same time everyday- within the hour  Advised pt to continue doing the same , & reassured her that some months blding can be very light or no blding while on OCP's  Advised to call with any further questions/concerns

## 2021-07-30 NOTE — TELEPHONE ENCOUNTER
----- Message from Georgi Isabel sent at 7/30/2021  2:19 PM EDT -----  Regarding: Non-Urgent Medical Question  Contact: [phone]  Hi Dr Nicholson Kay! I am reaching out because I missed my period this month  I am currently taking ortho tri lo sprintec and have been taking this since Roldan was born in July 2020 as well as before my pregnancy with him  I did simeon some irregular periods for a while after giving birth but they did balance out  I took my 4th week of pills last week (my period week) and now this week on Sunday I just started my new pack  I got a negative pregnancy test last Thursday and again today  Is there anything I should worry about or look out for?

## 2021-12-27 ENCOUNTER — ANNUAL EXAM (OUTPATIENT)
Dept: OBGYN CLINIC | Facility: CLINIC | Age: 31
End: 2021-12-27
Payer: COMMERCIAL

## 2021-12-27 VITALS
DIASTOLIC BLOOD PRESSURE: 58 MMHG | WEIGHT: 158.8 LBS | SYSTOLIC BLOOD PRESSURE: 122 MMHG | HEIGHT: 62 IN | BODY MASS INDEX: 29.22 KG/M2

## 2021-12-27 DIAGNOSIS — Z01.419 ENCOUNTER FOR ANNUAL ROUTINE GYNECOLOGICAL EXAMINATION: Primary | ICD-10-CM

## 2021-12-27 DIAGNOSIS — Z30.41 ENCOUNTER FOR SURVEILLANCE OF CONTRACEPTIVE PILLS: ICD-10-CM

## 2021-12-27 PROCEDURE — 99395 PREV VISIT EST AGE 18-39: CPT | Performed by: OBSTETRICS & GYNECOLOGY

## 2021-12-27 RX ORDER — DIPHENOXYLATE HYDROCHLORIDE AND ATROPINE SULFATE 2.5; .025 MG/1; MG/1
1 TABLET ORAL DAILY
COMMUNITY

## 2021-12-27 RX ORDER — NORGESTIMATE AND ETHINYL ESTRADIOL 7DAYSX3 LO
1 KIT ORAL DAILY
Qty: 84 TABLET | Refills: 1 | Status: SHIPPED | OUTPATIENT
Start: 2021-12-27 | End: 2022-06-02 | Stop reason: SDUPTHER

## 2022-01-01 NOTE — DISCHARGE INSTRUCTIONS
Section   WHAT YOU SHOULD KNOW:   A  delivery, or , is abdominal surgery to deliver your baby  There are many reasons you may need a   · A  may be scheduled before labor if you had a  with your last baby  It may be scheduled if your baby is not positioned normally, or you are pregnant with more than 1 baby  · Your caregiver may perform an emergency  during labor to prevent life-threatening complications for you or your baby  A  may be done if your cervix does not dilate after several hours of active labor  · Other reasons for a  include maternal infections and problems with the placenta  AFTER YOU LEAVE:   Medicines:   · Prescription pain medicine  may be given  Ask how to take this medicine safely  · Acetaminophen  decreases pain and fever  It is available without a doctor's order  Ask how much to take and how often to take it  Follow directions  Acetaminophen can cause liver damage if not taken correctly  · NSAIDs  help decrease swelling and pain or fever  This medicine is available with or without a doctor's order  NSAIDs can cause stomach bleeding or kidney problems in certain people  If you take blood thinner medicine, always ask your obstetrician if NSAIDs are safe for you  Always read the medicine label and follow directions  · Take your medicine as directed  Contact your obstetrician (OB) if you think your medicine is not helping or if you have side effects  Tell him if you are allergic to any medicine  Keep a list of the medicines, vitamins, and herbs you take  Include the amounts, and when and why you take them  Bring the list or the pill bottles to follow-up visits  Carry your medicine list with you in case of an emergency  Follow up with your OB as directed: You may need to return to have your stitches or staples removed   Write down your questions so you remember to ask them during your Health Maintenance Due   Topic Date Due   • Hepatitis B Vaccine (1 of 3 - 3-dose primary series) Never done       Patient is up to date, no discussion needed.     visits  Wound care:  Carefully wash your wound with soap and water every day  Keep your wound clean and dry  Wear loose, comfortable clothes that do not rub against your wound  Ask your OB about bathing and showering  Drink plenty of liquids: You can lower your risk for a blood clot if you drink plenty of liquids  Ask how much liquid to drink each day and which liquids are best for you  Limit activity until you have fully recovered from surgery:   · Ask when it is safe for you to drive, walk up stairs, lift heavy objects, and have sex  · Ask when it is okay to exercise, and what types of exercise to do  Start slowly and do more as you get stronger  Contact your OB if:   · You have heavy vaginal bleeding that fills 1 or more sanitary pads in 1 hour  · You have a fever  · Your incision is swollen, red, or draining pus  · You have questions or concerns about yourself or your baby  Seek care immediately or call 911 if:   · Blood soaks through your bandage  · Your stitches come apart  · You feel lightheaded, short of breath, and have chest pain  · You cough up blood  · Your arm or leg feels warm, tender, and painful  It may look swollen and red  © 2014 [address] is for End User's use only and may not be sold, redistributed or otherwise used for commercial purposes  All illustrations and images included in CareNotes® are the copyrighted property of A [employer] M [employer]  or [employer] Davis  The above information is an  only  It is not intended as medical advice for individual conditions or treatments  Talk to your doctor, nurse or pharmacist before following any medical regimen to see if it is safe and effective for you

## 2022-01-03 ENCOUNTER — TELEPHONE (OUTPATIENT)
Dept: INTERNAL MEDICINE CLINIC | Facility: CLINIC | Age: 32
End: 2022-01-03

## 2022-01-03 DIAGNOSIS — R05.9 COUGH: Primary | ICD-10-CM

## 2022-01-03 PROCEDURE — U0005 INFEC AGEN DETEC AMPLI PROBE: HCPCS | Performed by: INTERNAL MEDICINE

## 2022-01-03 PROCEDURE — U0003 INFECTIOUS AGENT DETECTION BY NUCLEIC ACID (DNA OR RNA); SEVERE ACUTE RESPIRATORY SYNDROME CORONAVIRUS 2 (SARS-COV-2) (CORONAVIRUS DISEASE [COVID-19]), AMPLIFIED PROBE TECHNIQUE, MAKING USE OF HIGH THROUGHPUT TECHNOLOGIES AS DESCRIBED BY CMS-2020-01-R: HCPCS | Performed by: INTERNAL MEDICINE

## 2022-01-03 NOTE — TELEPHONE ENCOUNTER
Patient started on Friday 12/31/21 with vomiting and on Saturday 1/1 she started with congestion and cough   She is requesting a covid test? Please advise

## 2022-01-03 NOTE — TELEPHONE ENCOUNTER
Done, let pt know    She should let them know when she gets the test whether not she worsen health care with direct patient contact, and whether or not she is a [employer]

## 2022-01-06 ENCOUNTER — TELEMEDICINE (OUTPATIENT)
Dept: INTERNAL MEDICINE CLINIC | Facility: CLINIC | Age: 32
End: 2022-01-06
Payer: COMMERCIAL

## 2022-01-06 DIAGNOSIS — U07.1 COVID-19 VIRUS INFECTION: Primary | ICD-10-CM

## 2022-01-06 PROCEDURE — 99214 OFFICE O/P EST MOD 30 MIN: CPT | Performed by: INTERNAL MEDICINE

## 2022-01-06 NOTE — PROGRESS NOTES
COVID-19 Outpatient Progress Note    Assessment/Plan:    Problem List Items Addressed This Visit        Other    COVID-19 virus infection - Primary     See HPI for documentation of patient's current symptoms and past symptoms  Discussed with patient treatment for COVID includes vitamin-C, vitamin-D, and zinc   If patient is experiencing increased chest discomfort, will consider other interventions such as cardiac echo  If patient were to develop shortness of breath with hypoxia, I recommend getting to the emergency room  No wheezing  Discussed getting CXR if worsening symptoms  Patient also instructed reach out if she were developed calf pain or swelling on 1 side  BMI Counseling: There is no height or weight on file to calculate BMI  The BMI is above normal  Nutrition recommendations include encouraging healthy choices of fruits and vegetables and moderation in carbohydrate intake  Exercise recommendations include exercising 3-5 times per week  Rationale for BMI follow-up plan is due to patient being overweight or obese  Depression Screening and Follow-up Plan: Patient was screened for depression during today's encounter  They screened negative with a PHQ-2 score of 0  Disposition:     I have spent 20 minutes directly with the patient  Greater than 50% of this time was spent in counseling/coordination of care regarding: instructions for management, patient and family education and impressions        Encounter provider Dara Garcia MD    Provider located at [address] [place] [id number]    Recent Visits  Date Type Provider Dept   01/03/22 Telephone Dara Garcia MD [address] [facility] recent visits within past 7 days and meeting all other requirements  Today's Visits  Date Type Provider Dept   01/06/22 Telemedicine Dara Garcia MD [address] [facility] today's visits and meeting all other requirements  Future Appointments  No visits were found meeting these conditions  Showing future appointments within next 150 days and meeting all other requirements     This virtual check-in was done via [facility] and patient was informed that this is a secure, HIPAA-compliant platform  She agrees to proceed  Patient agrees to participate in a virtual check in via telephone or video visit instead of presenting to the office to address urgent/immediate medical needs  Patient is aware this is a billable service  After connecting through [facility], the patient was identified by name and date of birth  Brando Flores was informed that this was a telemedicine visit and that the exam was being conducted confidentially over secure lines  My office door was closed  No one else was in the room  Brando Flores acknowledged consent and understanding of privacy and security of the telemedicine visit  I informed the patient that I have reviewed her record in Epic and presented the opportunity for her to ask any questions regarding the visit today  The patient agreed to participate  Verification of patient location:  Patient is located in the following state in which I hold an active license: [place]    Subjective:   Brando Flores is a 32 y o  female who is concerned about COVID-19  Patient's symptoms include cough and chest tightness (achey and sharp)  Patient denies fever, chills, fatigue, malaise, congestion, rhinorrhea, sore throat, anosmia, loss of taste, shortness of breath, abdominal pain, nausea, vomiting, diarrhea, myalgias and headaches  Date of symptom onset: 12/30/2021  COVID-19 vaccination status: Not vaccinated    Pt has had the chest pain at night over the past two nights when lying down  Pt denies pleuritic pain  No SOB  Pulse ox is good  No calf pain or swelling  No GERD symptoms, but in location where she would expect GERD  Pepcid and Tums have not helped  No problems with food getting stuck  Patient's other symptoms of COVID have been improving and these included nausea, vomiting, sore throat, congestion, headache  Lab Results   Component Value Date    SARSCOV2 Positive (A) 2022    SARSCOV2 Detected (A) 2021     Past Medical History:   Diagnosis Date    Herpes     genital herpes x 1  5 yrs ago -no other outbreaks    Migraine     with previous pregnancy    Miscarriage     chemical pregnancy   5 wks    S/P  section 2020    Varicella     childhood chickenpox     Past Surgical History:   Procedure Laterality Date    ADENOIDECTOMY      UT  DELIVERY ONLY N/A 2020    Procedure:  SECTION (); Surgeon: Chandu Singh MD;  Location: [facility] ;  Service: Obstetrics    TONSILLECTOMY  2013    WISDOM TOOTH EXTRACTION       Current Outpatient Medications   Medication Sig Dispense Refill    multivitamin (THERAGRAN) TABS Take 1 tablet by mouth daily      norgestimate-ethinyl estradiol (ORTHO TRI-CYCLEN LO) 0 18/0 215/0 25 MG-25 MCG per tablet Take 1 tablet by mouth daily 84 tablet 1     No current facility-administered medications for this visit  Allergies   Allergen Reactions    Sulfa Antibiotics Other (See Comments) and Hives     Rash-as infant       Review of Systems   Constitutional: Negative for chills, fatigue and fever  HENT: Negative for congestion, rhinorrhea and sore throat  Respiratory: Positive for cough and chest tightness (achey and sharp)  Negative for shortness of breath  Gastrointestinal: Negative for abdominal pain, diarrhea, nausea and vomiting  Musculoskeletal: Negative for myalgias  Neurological: Negative for headaches  Objective: There were no vitals filed for this visit  Physical Exam  Constitutional:       General: She is not in acute distress  Appearance: Normal appearance  She is not toxic-appearing  Pulmonary:      Effort: Pulmonary effort is normal  No respiratory distress     Skin: Coloration: Skin is not jaundiced or pale  Neurological:      Mental Status: She is alert and oriented to person, place, and time  Psychiatric:         Mood and Affect: Mood normal          VIRTUAL VISIT DISCLAIMER    Nelson Collazo verbally agrees to participate in [employer]  Pt is aware that [employer] could be limited without vital signs or the ability to perform a full hands-on physical Edmar Golden understands she or the provider may request at any time to terminate the video visit and request the patient to seek care or treatment in person

## 2022-01-06 NOTE — PATIENT INSTRUCTIONS
Problem List Items Addressed This Visit        Other    COVID-19 virus infection - Primary     See HPI for documentation of patient's current symptoms and past symptoms  Discussed with patient treatment for COVID includes vitamin-C, vitamin-D, and zinc   If patient is experiencing increased chest discomfort, will consider other interventions such as cardiac echo  If patient were to develop shortness of breath with hypoxia, I recommend getting to the emergency room  No wheezing  Discussed getting CXR if worsening symptoms  Patient also instructed reach out if she were developed calf pain or swelling on 1 side

## 2022-01-06 NOTE — ASSESSMENT & PLAN NOTE
See HPI for documentation of patient's current symptoms and past symptoms  Discussed with patient treatment for COVID includes vitamin-C, vitamin-D, and zinc   If patient is experiencing increased chest discomfort, will consider other interventions such as cardiac echo  If patient were to develop shortness of breath with hypoxia, I recommend getting to the emergency room  No wheezing  Discussed getting CXR if worsening symptoms  Patient also instructed reach out if she were developed calf pain or swelling on 1 side

## 2022-03-30 ENCOUNTER — APPOINTMENT (OUTPATIENT)
Dept: LAB | Facility: HOSPITAL | Age: 32
End: 2022-03-30

## 2022-03-30 DIAGNOSIS — Z00.8 HEALTH EXAMINATION IN POPULATION SURVEYS: ICD-10-CM

## 2022-03-30 LAB
CHOLEST SERPL-MCNC: 218 MG/DL
EST. AVERAGE GLUCOSE BLD GHB EST-MCNC: 97 MG/DL
HBA1C MFR BLD: 5 %
HDLC SERPL-MCNC: 58 MG/DL
LDLC SERPL CALC-MCNC: 126 MG/DL (ref 0–100)
NONHDLC SERPL-MCNC: 160 MG/DL
TRIGL SERPL-MCNC: 171 MG/DL

## 2022-03-30 PROCEDURE — 83036 HEMOGLOBIN GLYCOSYLATED A1C: CPT

## 2022-03-30 PROCEDURE — 80061 LIPID PANEL: CPT

## 2022-03-30 PROCEDURE — 36415 COLL VENOUS BLD VENIPUNCTURE: CPT

## 2022-06-02 DIAGNOSIS — Z30.41 ENCOUNTER FOR SURVEILLANCE OF CONTRACEPTIVE PILLS: ICD-10-CM

## 2022-06-02 DIAGNOSIS — E55.9 VITAMIN D DEFICIENCY: ICD-10-CM

## 2022-06-02 DIAGNOSIS — E78.00 HYPERCHOLESTEREMIA: Primary | ICD-10-CM

## 2022-06-02 RX ORDER — NORGESTIMATE AND ETHINYL ESTRADIOL 7DAYSX3 LO
1 KIT ORAL DAILY
Qty: 84 TABLET | Refills: 1 | Status: SHIPPED | OUTPATIENT
Start: 2022-06-02

## 2022-06-29 ENCOUNTER — LAB (OUTPATIENT)
Dept: LAB | Facility: HOSPITAL | Age: 32
End: 2022-06-29
Payer: COMMERCIAL

## 2022-06-29 DIAGNOSIS — E55.9 VITAMIN D DEFICIENCY: ICD-10-CM

## 2022-06-29 DIAGNOSIS — E78.00 HYPERCHOLESTEREMIA: ICD-10-CM

## 2022-06-29 LAB
25(OH)D3 SERPL-MCNC: 44.2 NG/ML (ref 30–100)
ALBUMIN SERPL BCP-MCNC: 4 G/DL (ref 3.5–5)
ALP SERPL-CCNC: 59 U/L (ref 46–116)
ALT SERPL W P-5'-P-CCNC: 22 U/L (ref 12–78)
ANION GAP SERPL CALCULATED.3IONS-SCNC: 2 MMOL/L (ref 4–13)
AST SERPL W P-5'-P-CCNC: 13 U/L (ref 5–45)
BASOPHILS # BLD AUTO: 0.03 THOUSANDS/ΜL (ref 0–0.1)
BASOPHILS NFR BLD AUTO: 0 % (ref 0–1)
BILIRUB SERPL-MCNC: 0.52 MG/DL (ref 0.2–1)
BUN SERPL-MCNC: 14 MG/DL (ref 5–25)
CALCIUM SERPL-MCNC: 9.1 MG/DL (ref 8.3–10.1)
CHLORIDE SERPL-SCNC: 107 MMOL/L (ref 100–108)
CHOLEST SERPL-MCNC: 182 MG/DL
CO2 SERPL-SCNC: 29 MMOL/L (ref 21–32)
CREAT SERPL-MCNC: 0.75 MG/DL (ref 0.6–1.3)
EOSINOPHIL # BLD AUTO: 0.29 THOUSAND/ΜL (ref 0–0.61)
EOSINOPHIL NFR BLD AUTO: 4 % (ref 0–6)
ERYTHROCYTE [DISTWIDTH] IN BLOOD BY AUTOMATED COUNT: 13.1 % (ref 11.6–15.1)
GFR SERPL CREATININE-BSD FRML MDRD: 106 ML/MIN/1.73SQ M
GLUCOSE P FAST SERPL-MCNC: 83 MG/DL (ref 65–99)
HCT VFR BLD AUTO: 44.3 % (ref 34.8–46.1)
HDLC SERPL-MCNC: 51 MG/DL
HGB BLD-MCNC: 14.5 G/DL (ref 11.5–15.4)
IMM GRANULOCYTES # BLD AUTO: 0.01 THOUSAND/UL (ref 0–0.2)
IMM GRANULOCYTES NFR BLD AUTO: 0 % (ref 0–2)
LDLC SERPL CALC-MCNC: 107 MG/DL (ref 0–100)
LYMPHOCYTES # BLD AUTO: 2.06 THOUSANDS/ΜL (ref 0.6–4.47)
LYMPHOCYTES NFR BLD AUTO: 27 % (ref 14–44)
MCH RBC QN AUTO: 28.8 PG (ref 26.8–34.3)
MCHC RBC AUTO-ENTMCNC: 32.7 G/DL (ref 31.4–37.4)
MCV RBC AUTO: 88 FL (ref 82–98)
MONOCYTES # BLD AUTO: 0.49 THOUSAND/ΜL (ref 0.17–1.22)
MONOCYTES NFR BLD AUTO: 6 % (ref 4–12)
NEUTROPHILS # BLD AUTO: 4.79 THOUSANDS/ΜL (ref 1.85–7.62)
NEUTS SEG NFR BLD AUTO: 63 % (ref 43–75)
NRBC BLD AUTO-RTO: 0 /100 WBCS
PLATELET # BLD AUTO: 316 THOUSANDS/UL (ref 149–390)
PMV BLD AUTO: 9.7 FL (ref 8.9–12.7)
POTASSIUM SERPL-SCNC: 4.3 MMOL/L (ref 3.5–5.3)
PROT SERPL-MCNC: 7.5 G/DL (ref 6.4–8.2)
RBC # BLD AUTO: 5.03 MILLION/UL (ref 3.81–5.12)
SODIUM SERPL-SCNC: 138 MMOL/L (ref 136–145)
TRIGL SERPL-MCNC: 120 MG/DL
TSH SERPL DL<=0.05 MIU/L-ACNC: 2.48 UIU/ML (ref 0.45–4.5)
WBC # BLD AUTO: 7.67 THOUSAND/UL (ref 4.31–10.16)

## 2022-06-29 PROCEDURE — 82306 VITAMIN D 25 HYDROXY: CPT

## 2022-06-29 PROCEDURE — 36415 COLL VENOUS BLD VENIPUNCTURE: CPT

## 2022-06-29 PROCEDURE — 85025 COMPLETE CBC W/AUTO DIFF WBC: CPT

## 2022-06-29 PROCEDURE — 80061 LIPID PANEL: CPT

## 2022-06-29 PROCEDURE — 80053 COMPREHEN METABOLIC PANEL: CPT

## 2022-06-29 PROCEDURE — 84443 ASSAY THYROID STIM HORMONE: CPT

## 2022-07-05 ENCOUNTER — OFFICE VISIT (OUTPATIENT)
Dept: INTERNAL MEDICINE CLINIC | Facility: CLINIC | Age: 32
End: 2022-07-05
Payer: COMMERCIAL

## 2022-07-05 VITALS
OXYGEN SATURATION: 98 % | WEIGHT: 155.2 LBS | BODY MASS INDEX: 28.56 KG/M2 | DIASTOLIC BLOOD PRESSURE: 64 MMHG | HEART RATE: 78 BPM | HEIGHT: 62 IN | SYSTOLIC BLOOD PRESSURE: 132 MMHG

## 2022-07-05 DIAGNOSIS — Z00.00 WELLNESS EXAMINATION: Primary | ICD-10-CM

## 2022-07-05 PROCEDURE — 99395 PREV VISIT EST AGE 18-39: CPT | Performed by: INTERNAL MEDICINE

## 2022-07-05 NOTE — ASSESSMENT & PLAN NOTE
Discussed preventative health, cancer screening, immunizations, and safety issues  I recommend yearly flu shot     Patient up-to-date with Tdap vaccination

## 2022-07-05 NOTE — PATIENT INSTRUCTIONS
Problem List Items Addressed This Visit          Other    Wellness examination - Primary      Discussed preventative health, cancer screening, immunizations, and safety issues  I recommend yearly flu shot     Patient up-to-date with Tdap vaccination

## 2022-07-05 NOTE — PROGRESS NOTES
Assessment/Plan:    Wellness examination   Discussed preventative health, cancer screening, immunizations, and safety issues  I recommend yearly flu shot  Patient up-to-date with Tdap vaccination       Diagnoses and all orders for this visit:    Wellness examination      BMI Counseling: Body mass index is 28 39 kg/m²  The BMI is above normal  Nutrition recommendations include encouraging healthy choices of fruits and vegetables and moderation in carbohydrate intake  Exercise recommendations include exercising 3-5 times per week  Rationale for BMI follow-up plan is due to patient being overweight or obese  Subjective:      Patient ID: Nida Hernandez is a 32 y o  female  Wellness:  Patient sees a dentist regularly, no smoking cigarettes, patient eats healthy diet, and is active      The following portions of the patient's history were reviewed and updated as appropriate: allergies, current medications, past family history, past medical history, past social history, past surgical history and problem list     Review of Systems   Constitutional: Negative for chills, fatigue and fever  HENT: Negative for congestion, nosebleeds, postnasal drip, sore throat and trouble swallowing  Eyes: Negative for pain  Respiratory: Negative for cough, chest tightness, shortness of breath and wheezing  Cardiovascular: Negative for chest pain, palpitations and leg swelling  Gastrointestinal: Negative for abdominal pain, constipation, diarrhea, nausea and vomiting  Endocrine: Negative for polydipsia and polyuria  Genitourinary: Negative for dysuria, flank pain and hematuria  Musculoskeletal: Negative for arthralgias, back pain and myalgias  Skin: Negative for rash  Neurological: Negative for dizziness, tremors, light-headedness and headaches  Hematological: Does not bruise/bleed easily  Psychiatric/Behavioral: Negative for confusion and dysphoric mood  The patient is not nervous/anxious  Objective:      /64 (BP Location: Left arm, Patient Position: Sitting, Cuff Size: Standard)   Pulse 78   Ht 5' 2" (1 575 m)   Wt 70 4 kg (155 lb 3 2 oz)   SpO2 98%   BMI 28 39 kg/m²          Physical Exam  Vitals reviewed  Constitutional:       General: She is not in acute distress  Appearance: Normal appearance  She is well-developed  HENT:      Head: Normocephalic and atraumatic  Right Ear: Tympanic membrane, ear canal and external ear normal       Left Ear: Tympanic membrane, ear canal and external ear normal       Nose: Nose normal       Mouth/Throat:      Mouth: Mucous membranes are moist       Pharynx: No oropharyngeal exudate  Eyes:      General: No scleral icterus  Conjunctiva/sclera: Conjunctivae normal    Neck:      Thyroid: No thyroid mass, thyromegaly or thyroid tenderness  Vascular: No carotid bruit  Trachea: No tracheal deviation  Cardiovascular:      Rate and Rhythm: Normal rate and regular rhythm  Heart sounds: Normal heart sounds  No murmur heard  Pulmonary:      Effort: Pulmonary effort is normal  No respiratory distress  Breath sounds: Normal breath sounds  No wheezing or rales  Abdominal:      General: Bowel sounds are normal       Palpations: Abdomen is soft  Tenderness: There is no abdominal tenderness  There is no guarding or rebound  Musculoskeletal:      Cervical back: Normal range of motion and neck supple  Right lower leg: No edema  Left lower leg: No edema  Lymphadenopathy:      Cervical: No cervical adenopathy  Skin:     Coloration: Skin is not jaundiced or pale  Neurological:      General: No focal deficit present  Mental Status: She is alert and oriented to person, place, and time  Psychiatric:         Mood and Affect: Mood normal          Behavior: Behavior normal          Thought Content:  Thought content normal          Judgment: Judgment normal

## 2022-07-21 NOTE — PROGRESS NOTES
H&P Exam - Gynecology   Nimesh Elizabeth 29 y o  female MRN: [id number]    Assessment/Plan     Assessment:    @ 8 weeks 6 days [facility] [place] 2020    Plan:  Schedule initial OB intake and visit      HPI:  Nimesh Elizabeth is a 29 y o  female who presents with amenorrhea and positive home pregnancy test   Denies any VB  Mild nausea  Historical Information   No past medical history on file  Past Surgical History:   Procedure Laterality Date    ADENOIDECTOMY      TONSILLECTOMY  2013    WISDOM TOOTH EXTRACTION       OB/GYN History:   OB History        2    Para   1    Term   1            AB        Living   1       SAB        TAB        Ectopic        Multiple        Live Births   1                 Family History   Problem Relation Age of Onset    Lymphoma Father     Hypertension Father     Osteoporosis Mother     Hypertension Mother     Breast cancer Maternal Grandmother     Heart disease Paternal Grandmother      Social History   Social History     Substance and Sexual Activity   Alcohol Use Yes    Comment: socially     Social History     Substance and Sexual Activity   Drug Use No     Social History     Tobacco Use   Smoking Status Never Smoker   Smokeless Tobacco Never Used       Meds/Allergies     Current Outpatient Medications:     Prenatal MV-Min-Fe Fum-FA-DHA (PRENATAL+DHA PO), Take by mouth, Disp: , Rfl:   Allergies   Allergen Reactions    Sulfa Antibiotics Other (See Comments) and Hives     Rash-as infant       Objective   Vitals: Blood pressure 126/82, weight 67 9 kg (149 lb 9 6 oz), last menstrual period 10/03/2019  Physical Exam   Constitutional: She is oriented to person, place, and time  She appears well-developed and well-nourished  Genitourinary: Vagina normal    HENT:   Head: Normocephalic  Cardiovascular: Normal rate  Pulmonary/Chest: Effort normal    Abdominal: Soft  She exhibits no distension  Musculoskeletal: She exhibits no edema     Neurological: She is alert and Topical Retinoid counseling:  Patient advised to apply a pea-sized amount only at bedtime and wait 30 minutes after washing their face before applying.  If too drying, patient may add a non-comedogenic moisturizer. The patient verbalized understanding of the proper use and possible adverse effects of retinoids.  All of the patient's questions and concerns were addressed. oriented to person, place, and time  Skin: Skin is warm and dry  Psychiatric: She has a normal mood and affect  Vitals reviewed  AMB US Pelvic Non OB  Date/Time: 12/5/2019 4:01 PM  Performed by: Paula Villalta MD  Authorized by: Paula Villalta MD     Procedure details:     Indications comment:  Amenorrhea    Technique:  Transvaginal US, Non-OB    Position: lithotomy exam    Uterine findings:     Adnexal mass: not identified      Myomas: not identified    Left ovary findings:     Left ovary:  Visualized    Cysts: not identified    Right ovary findings:     Right ovary:  Visualized    Cysts: not identified    Other findings:     Free pelvic fluid: not identified    Post-Procedure Details:     Impression:  Single viable intrauterine gestation CRL 2 37cm +yolk sac EGA 9 weeks 0d FHM 177bpm    Tolerance:   Tolerated well, no immediate complications    Complications: no complications Birth Control Pills Counseling: Birth Control Pill Counseling: I discussed with the patient the potential side effects of OCPs including but not limited to increased risk of stroke, heart attack, thrombophlebitis, deep venous thrombosis, hepatic adenomas, breast changes, GI upset, headaches, and depression.  The patient verbalized understanding of the proper use and possible adverse effects of OCPs. All of the patient's questions and concerns were addressed. Topical Clindamycin Counseling: Patient counseled that this medication may cause skin irritation or allergic reactions.  In the event of skin irritation, the patient was advised to reduce the amount of the drug applied or use it less frequently.   The patient verbalized understanding of the proper use and possible adverse effects of clindamycin.  All of the patient's questions and concerns were addressed. Minocycline Pregnancy And Lactation Text: This medication is Pregnancy Category D and not consider safe during pregnancy. It is also excreted in breast milk. Doxycycline Counseling:  Patient counseled regarding possible photosensitivity and increased risk for sunburn.  Patient instructed to avoid sunlight, if possible.  When exposed to sunlight, patients should wear protective clothing, sunglasses, and sunscreen.  The patient was instructed to call the office immediately if the following severe adverse effects occur:  hearing changes, easy bruising/bleeding, severe headache, or vision changes.  The patient verbalized understanding of the proper use and possible adverse effects of doxycycline.  All of the patient's questions and concerns were addressed. Detail Level: Zone Azithromycin Counseling:  I discussed with the patient the risks of azithromycin including but not limited to GI upset, allergic reaction, drug rash, diarrhea, and yeast infections. Tetracycline Counseling: Patient counseled regarding possible photosensitivity and increased risk for sunburn.  Patient instructed to avoid sunlight, if possible.  When exposed to sunlight, patients should wear protective clothing, sunglasses, and sunscreen.  The patient was instructed to call the office immediately if the following severe adverse effects occur:  hearing changes, easy bruising/bleeding, severe headache, or vision changes.  The patient verbalized understanding of the proper use and possible adverse effects of tetracycline.  All of the patient's questions and concerns were addressed. Patient understands to avoid pregnancy while on therapy due to potential birth defects. Doxycycline Pregnancy And Lactation Text: This medication is Pregnancy Category D and not consider safe during pregnancy. It is also excreted in breast milk but is considered safe for shorter treatment courses. Azelaic Acid Pregnancy And Lactation Text: This medication is considered safe during pregnancy and breast feeding. Isotretinoin Pregnancy And Lactation Text: This medication is Pregnancy Category X and is considered extremely dangerous during pregnancy. It is unknown if it is excreted in breast milk. Topical Retinoid Pregnancy And Lactation Text: This medication is Pregnancy Category C. It is unknown if this medication is excreted in breast milk. High Dose Vitamin A Counseling: Side effects reviewed, pt to contact office should one occur. Topical Clindamycin Pregnancy And Lactation Text: This medication is Pregnancy Category B and is considered safe during pregnancy. It is unknown if it is excreted in breast milk. Sarecycline Counseling: Patient advised regarding possible photosensitivity and discoloration of the teeth, skin, lips, tongue and gums.  Patient instructed to avoid sunlight, if possible.  When exposed to sunlight, patients should wear protective clothing, sunglasses, and sunscreen.  The patient was instructed to call the office immediately if the following severe adverse effects occur:  hearing changes, easy bruising/bleeding, severe headache, or vision changes.  The patient verbalized understanding of the proper use and possible adverse effects of sarecycline.  All of the patient's questions and concerns were addressed. Use Enhanced Medication Counseling?: No Azithromycin Pregnancy And Lactation Text: This medication is considered safe during pregnancy and is also secreted in breast milk. Topical Sulfur Applications Counseling: Topical Sulfur Counseling: Patient counseled that this medication may cause skin irritation or allergic reactions.  In the event of skin irritation, the patient was advised to reduce the amount of the drug applied or use it less frequently.   The patient verbalized understanding of the proper use and possible adverse effects of topical sulfur application.  All of the patient's questions and concerns were addressed. Winlevi Counseling:  I discussed with the patient the risks of topical clascoterone including but not limited to erythema, scaling, itching, and stinging. Patient voiced their understanding. Birth Control Pills Pregnancy And Lactation Text: This medication should be avoided if pregnant and for the first 30 days post-partum. Winlevi Pregnancy And Lactation Text: This medication is considered safe during pregnancy and breastfeeding. Dapsone Counseling: I discussed with the patient the risks of dapsone including but not limited to hemolytic anemia, agranulocytosis, rashes, methemoglobinemia, kidney failure, peripheral neuropathy, headaches, GI upset, and liver toxicity.  Patients who start dapsone require monitoring including baseline LFTs and weekly CBCs for the first month, then every month thereafter.  The patient verbalized understanding of the proper use and possible adverse effects of dapsone.  All of the patient's questions and concerns were addressed. Erythromycin Counseling:  I discussed with the patient the risks of erythromycin including but not limited to GI upset, allergic reaction, drug rash, diarrhea, increase in liver enzymes, and yeast infections. Erythromycin Pregnancy And Lactation Text: This medication is Pregnancy Category B and is considered safe during pregnancy. It is also excreted in breast milk. Benzoyl Peroxide Counseling: Patient counseled that medicine may cause skin irritation and bleach clothing.  In the event of skin irritation, the patient was advised to reduce the amount of the drug applied or use it less frequently.   The patient verbalized understanding of the proper use and possible adverse effects of benzoyl peroxide.  All of the patient's questions and concerns were addressed. Bactrim Counseling:  I discussed with the patient the risks of sulfa antibiotics including but not limited to GI upset, allergic reaction, drug rash, diarrhea, dizziness, photosensitivity, and yeast infections.  Rarely, more serious reactions can occur including but not limited to aplastic anemia, agranulocytosis, methemoglobinemia, blood dyscrasias, liver or kidney failure, lung infiltrates or desquamative/blistering drug rashes. Tazorac Counseling:  Patient advised that medication is irritating and drying.  Patient may need to apply sparingly and wash off after an hour before eventually leaving it on overnight.  The patient verbalized understanding of the proper use and possible adverse effects of tazorac.  All of the patient's questions and concerns were addressed. High Dose Vitamin A Pregnancy And Lactation Text: High dose vitamin A therapy is contraindicated during pregnancy and breast feeding. Minocycline Counseling: Patient advised regarding possible photosensitivity and discoloration of the teeth, skin, lips, tongue and gums.  Patient instructed to avoid sunlight, if possible.  When exposed to sunlight, patients should wear protective clothing, sunglasses, and sunscreen.  The patient was instructed to call the office immediately if the following severe adverse effects occur:  hearing changes, easy bruising/bleeding, severe headache, or vision changes.  The patient verbalized understanding of the proper use and possible adverse effects of minocycline.  All of the patient's questions and concerns were addressed. Bactrim Pregnancy And Lactation Text: This medication is Pregnancy Category D and is known to cause fetal risk.  It is also excreted in breast milk. Tazorac Pregnancy And Lactation Text: This medication is not safe during pregnancy. It is unknown if this medication is excreted in breast milk. Dapsone Pregnancy And Lactation Text: This medication is Pregnancy Category C and is not considered safe during pregnancy or breast feeding. Topical Sulfur Applications Pregnancy And Lactation Text: This medication is Pregnancy Category C and has an unknown safety profile during pregnancy. It is unknown if this topical medication is excreted in breast milk. Spironolactone Counseling: Patient advised regarding risks of diarrhea, abdominal pain, hyperkalemia, birth defects (for female patients), liver toxicity and renal toxicity. The patient may need blood work to monitor liver and kidney function and potassium levels while on therapy. The patient verbalized understanding of the proper use and possible adverse effects of spironolactone.  All of the patient's questions and concerns were addressed. Azelaic Acid Counseling: Patient counseled that medicine may cause skin irritation and to avoid applying near the eyes.  In the event of skin irritation, the patient was advised to reduce the amount of the drug applied or use it less frequently.   The patient verbalized understanding of the proper use and possible adverse effects of azelaic acid.  All of the patient's questions and concerns were addressed. Spironolactone Pregnancy And Lactation Text: This medication can cause feminization of the male fetus and should be avoided during pregnancy. The active metabolite is also found in breast milk. Benzoyl Peroxide Pregnancy And Lactation Text: This medication is Pregnancy Category C. It is unknown if benzoyl peroxide is excreted in breast milk. Isotretinoin Counseling: Patient should get monthly blood tests, not donate blood, not drive at night if vision affected, not share medication, and not undergo elective surgery for 6 months after tx completed. Side effects reviewed, pt to contact office should one occur.

## 2022-11-01 ENCOUNTER — COMPLETE EYE EXAM (OUTPATIENT)
Dept: URBAN - METROPOLITAN AREA CLINIC 6 | Facility: CLINIC | Age: 32
End: 2022-11-01

## 2022-11-01 DIAGNOSIS — Z01.00: ICD-10-CM

## 2022-11-01 PROCEDURE — 92015 DETERMINE REFRACTIVE STATE: CPT

## 2022-11-01 PROCEDURE — 92014 COMPRE OPH EXAM EST PT 1/>: CPT

## 2022-11-01 ASSESSMENT — VISUAL ACUITY
OS_CC: J1+
OD_CC: J1+
OD_CC: 20/25
OS_CC: 20/20

## 2022-11-01 ASSESSMENT — TONOMETRY
OD_IOP_MMHG: 13
OS_IOP_MMHG: 15

## 2022-11-15 DIAGNOSIS — Z30.41 ENCOUNTER FOR SURVEILLANCE OF CONTRACEPTIVE PILLS: ICD-10-CM

## 2022-11-15 RX ORDER — NORGESTIMATE AND ETHINYL ESTRADIOL 7DAYSX3 LO
1 KIT ORAL DAILY
Qty: 84 TABLET | Refills: 0 | Status: SHIPPED | OUTPATIENT
Start: 2022-11-15

## 2023-01-03 ENCOUNTER — ANNUAL EXAM (OUTPATIENT)
Dept: OBGYN CLINIC | Facility: CLINIC | Age: 33
End: 2023-01-03

## 2023-01-03 VITALS
HEIGHT: 62 IN | SYSTOLIC BLOOD PRESSURE: 122 MMHG | BODY MASS INDEX: 28.05 KG/M2 | DIASTOLIC BLOOD PRESSURE: 74 MMHG | WEIGHT: 152.4 LBS

## 2023-01-03 DIAGNOSIS — Z01.419 ENCOUNTER FOR ANNUAL ROUTINE GYNECOLOGICAL EXAMINATION: Primary | ICD-10-CM

## 2023-01-03 DIAGNOSIS — Z30.41 ENCOUNTER FOR SURVEILLANCE OF CONTRACEPTIVE PILLS: ICD-10-CM

## 2023-01-03 PROBLEM — J06.9 URI (UPPER RESPIRATORY INFECTION): Status: RESOLVED | Noted: 2018-02-26 | Resolved: 2023-01-03

## 2023-01-03 PROBLEM — R43.2 LOSS OF TASTE: Status: RESOLVED | Noted: 2021-01-12 | Resolved: 2023-01-03

## 2023-01-03 RX ORDER — NORGESTIMATE AND ETHINYL ESTRADIOL 7DAYSX3 LO
1 KIT ORAL DAILY
Qty: 84 TABLET | Refills: 3 | Status: SHIPPED | OUTPATIENT
Start: 2023-01-03

## 2023-01-03 NOTE — PROGRESS NOTES
Assessment/Plan     1  Encounter for annual routine gynecological examination      2  Encounter for surveillance of contraceptive pills    - norgestimate-ethinyl estradiol (ORTHO TRI-CYCLEN LO) 0 18/0 215/0 25 MG-25 MCG per tablet; Take 1 tablet by mouth daily  Dispense: 84 tablet; Refill: 3        Subjective      Anna Lilly is a 28 y o  female who presents for annual exam  Periods are overall regular; occasionally has changes in flow  Dysmenorrhea:none  Cyclic symptoms:  none  No intermenstrual bleeding, spotting, or discharge  The patient denies any urinary or sexual concerns  Current contraception: OCP (estrogen/progesterone)  History of abnormal Pap smear: no  Regular self breast exam: yes  History of abnormal mammogram: no  History of abnormal lipids: no    Menstrual History:  OB History        3    Para   2    Term   2            AB   1    Living   2       SAB   1    IAB        Ectopic        Multiple   0    Live Births   2                Patient's last menstrual period was 2022 (exact date)  Period Cycle (Days): 28 (Occasionally skips menses, or just has brown d/c   Or can be a normal bleed )  Period Duration (Days): 5  Period Pattern: Regular  Menstrual Flow: Heavy, Light (Heavy x 2 days)  Menstrual Control: Tampon, Maxi pad  Menstrual Control Change Freq (Hours): 2-3  Dysmenorrhea: None    Past Medical History:   Diagnosis Date   • Abnormal ECG 19   • Abnormal Pap smear of cervix 2020    Abnormal pap, negative HPV   • Herpes     genital herpes x 1  5 yrs ago -no other outbreaks   • Migraine     with previous pregnancy   • Miscarriage     chemical pregnancy   5 wks   • S/P  section 2020   • Varicella     childhood chickenpox       Family History   Problem Relation Age of Onset   • Lymphoma Father    • Hypertension Father    • Cancer Father         recovered non hodskins lymphoma   • Osteoporosis Mother    • Hypertension Mother    • Supraventricular tachycardia Mother    • Breast cancer Maternal Grandmother    • Hypertension Maternal Grandmother    • Diabetes Maternal Grandmother    • Heart disease Maternal Grandmother    • Diabetes Paternal Grandmother    • Obesity Brother    • Angina Maternal Grandfather    • Diabetes Maternal Grandfather    • Cancer Maternal Grandfather    • Heart defect Maternal Grandfather    • Heart disease Maternal Grandfather    • Obesity Paternal Grandfather        The following portions of the patient's history were reviewed and updated as appropriate: allergies, current medications, past family history, past medical history, past social history, past surgical history and problem list     Review of Systems  Pertinent items are noted in HPI  Objective      /74 (BP Location: Right arm, Patient Position: Sitting, Cuff Size: Large)   Ht 5' 2" (1 575 m)   Wt 69 1 kg (152 lb 6 4 oz)   LMP 12/07/2022 (Exact Date)   BMI 27 87 kg/m²     General:   alert and oriented, in no acute distress   Heart:  Breasts: regular rate and rhythm   appear normal, no suspicious masses, no skin or nipple changes or axillary nodes     Lungs: effort normal   Abdomen: soft, non-tender, without masses or organomegaly   Vulva: normal   Vagina: normal mucosa   Cervix: no lesions   Uterus: normal size, mobile, non-tender   Adnexa: normal adnexa and no mass, fullness, tenderness

## 2023-04-05 ENCOUNTER — TRANSCRIBE ORDERS (OUTPATIENT)
Dept: LAB | Facility: CLINIC | Age: 33
End: 2023-04-05

## 2023-04-05 DIAGNOSIS — Z00.8 ENCOUNTER FOR OTHER GENERAL EXAMINATION: Primary | ICD-10-CM

## 2023-07-04 ENCOUNTER — OFFICE VISIT (OUTPATIENT)
Dept: URGENT CARE | Age: 33
End: 2023-07-04
Payer: COMMERCIAL

## 2023-07-04 VITALS
TEMPERATURE: 97.2 F | OXYGEN SATURATION: 98 % | SYSTOLIC BLOOD PRESSURE: 136 MMHG | HEART RATE: 92 BPM | RESPIRATION RATE: 18 BRPM | DIASTOLIC BLOOD PRESSURE: 56 MMHG

## 2023-07-04 DIAGNOSIS — H66.93 BILATERAL OTITIS MEDIA, UNSPECIFIED OTITIS MEDIA TYPE: Primary | ICD-10-CM

## 2023-07-04 PROCEDURE — 99213 OFFICE O/P EST LOW 20 MIN: CPT | Performed by: PHYSICIAN ASSISTANT

## 2023-07-04 RX ORDER — AMOXICILLIN 875 MG/1
875 TABLET, COATED ORAL 2 TIMES DAILY
Qty: 14 TABLET | Refills: 0 | Status: SHIPPED | OUTPATIENT
Start: 2023-07-04 | End: 2023-07-11

## 2023-07-04 NOTE — PROGRESS NOTES
[facility]        NAME: Alin Fry is a 28 y.o. female  : 1990    MRN: [id number]  DATE: 2023  TIME: 12:05 PM    Assessment and Plan   Bilateral otitis media, unspecified otitis media type [H66.93]  1. Bilateral otitis media, unspecified otitis media type  amoxicillin (AMOXIL) 875 mg tablet      Pt presents with symptoms and examination consistent with developing otitis media. Pt will be started on high does Amoxicillin 875 mg BID x  1 week to treat. Patient Instructions   Patient Instructions   Take antibiotic as prescribed. Recommend taking with food and probiotic or yogurt with live cultures. If symptoms are not improved in 2-3 days, follow-up with PCP. If symptoms worsen or new symptoms develop, report to the emergency department immediately. Chief Complaint     Chief Complaint   Patient presents with   • Earache     Patient been having right earache since Friday         History of Present Illness       28year old female presents with complaint of bilateral ear pain R > L since Friday. Pt reports that she feels her posterior cervical lymph nodes are swollen and it hurts to turn her head to the left and pulls along the right side of her neck. Denies fever and chills, reports sore throat last week that has since resolved. Notes she has been swimming in her pool in the last couple of weeks as well. Review of Systems   Review of Systems   Constitutional: Negative for chills and fever. HENT: Positive for ear pain. Negative for congestion, ear discharge, postnasal drip, rhinorrhea and sore throat. Respiratory: Negative for cough.           Current Medications       Current Outpatient Medications:   •  amoxicillin (AMOXIL) 875 mg tablet, Take 1 tablet (875 mg total) by mouth 2 (two) times a day for 7 days, Disp: 14 tablet, Rfl: 0  •  norgestimate-ethinyl estradiol (ORTHO TRI-CYCLEN LO) 0.18/0.215/0.25 MG-25 MCG per tablet, Take 1 tablet by mouth daily, Disp: 84 tablet, Rfl: 3    Current Allergies     Allergies as of 2023 - Reviewed 2023   Allergen Reaction Noted   • Sulfa antibiotics Other (See Comments) and Hives 01/10/2010            The following portions of the patient's history were reviewed and updated as appropriate: allergies, current medications, past family history, past medical history, past social history, past surgical history and problem list.     Past Medical History:   Diagnosis Date   • Abnormal ECG 19   • Abnormal Pap smear of cervix 2020    Abnormal pap, negative HPV   • Herpes     genital herpes x 1  5 yrs ago -no other outbreaks   • Migraine     with previous pregnancy   • Miscarriage     chemical pregnancy   5 wks   • S/P  section 2020   • Varicella     childhood chickenpox       Past Surgical History:   Procedure Laterality Date   • ADENOIDECTOMY     •  SECTION  2020   • NE  DELIVERY ONLY N/A 2020    Procedure:  SECTION (); Surgeon: Erika Herron MD;  Location: [facility] ;  Service: Obstetrics   • TONSILLECTOMY     • WISDOM TOOTH EXTRACTION         Family History   Problem Relation Age of Onset   • Lymphoma Father    • Hypertension Father    • Cancer Father         recovered non hodskins lymphoma   • Osteoporosis Mother    • Hypertension Mother    • Supraventricular tachycardia Mother    • Breast cancer Maternal Grandmother    • Hypertension Maternal Grandmother    • Diabetes Maternal Grandmother    • Heart disease Maternal Grandmother    • Diabetes Paternal Grandmother    • Obesity Brother    • Angina Maternal Grandfather    • Diabetes Maternal Grandfather    • Cancer Maternal Grandfather    • Heart defect Maternal Grandfather    • Heart disease Maternal Grandfather    • Obesity Paternal Grandfather          Medications have been verified.         Objective   /56 (BP Location: Right arm, Patient Position: Sitting, Cuff Size: Standard)   Pulse 92   Temp (!) 97.2 °F (36.2 °C)   Resp 18   SpO2 98%   No LMP recorded. (Menstrual status: Birth Control). Physical Exam     Physical Exam  Vitals and nursing note reviewed. Constitutional:       General: She is not in acute distress. Appearance: Normal appearance. She is not ill-appearing or toxic-appearing. HENT:      Head: Normocephalic and atraumatic. Jaw: No trismus. Right Ear: Ear canal and external ear normal. No swelling or tenderness. A middle ear effusion is present. There is no impacted cerumen. No foreign body. No mastoid tenderness. Tympanic membrane is bulging. Tympanic membrane is not erythematous or retracted. Left Ear: Ear canal and external ear normal. No swelling or tenderness. A middle ear effusion is present. There is no impacted cerumen. No foreign body. Ears:      Comments: Bilateral ear effusions with purulent material in the right middle ear, concerning for developing bilateral otitis media. Mild swelling of the canals with no erythema or irritation present. No auricular motion tenderness. Nose: No nasal deformity, mucosal edema, congestion or rhinorrhea. Right Nostril: No foreign body, epistaxis or occlusion. Left Nostril: No foreign body, epistaxis or occlusion. Right Turbinates: Not enlarged, swollen or pale. Left Turbinates: Not enlarged, swollen or pale. Mouth/Throat:      Lips: Pink. No lesions. Mouth: Mucous membranes are moist. No injury, oral lesions or angioedema. Dentition: Normal dentition. Tongue: No lesions. Tongue does not deviate from midline. Palate: No mass and lesions. Pharynx: Uvula midline. No pharyngeal swelling, oropharyngeal exudate, posterior oropharyngeal erythema or uvula swelling. Tonsils: No tonsillar exudate or tonsillar abscesses. Eyes:      General: Lids are normal. Gaze aligned appropriately. No allergic shiner. Extraocular Movements: Extraocular movements intact. Cardiovascular:      Rate and Rhythm: Normal rate. Pulmonary:      Effort: Pulmonary effort is normal.      Comments: Patient speaking in full sentences with no increased respiratory effort. No audible wheezing or stridor. Lymphadenopathy:      Cervical: No cervical adenopathy. Skin:     General: Skin is warm and dry. Neurological:      Mental Status: She is alert and oriented to person, place, and time. Coordination: Coordination is intact. Gait: Gait is intact. Psychiatric:         Attention and Perception: Attention and perception normal.         Mood and Affect: Mood and affect normal.         Speech: Speech normal.         Behavior: Behavior is cooperative. Note: Portions of this record may have been created with voice recognition software. Occasional wrong word or "sound a like" substitutions may have occurred due to the inherent limitations of voice recognition software. Please read the chart carefully and recognize, using context, where substitutions have occurred. *

## 2023-07-04 NOTE — PATIENT INSTRUCTIONS
Take antibiotic as prescribed. Recommend taking with food and probiotic or yogurt with live cultures. If symptoms are not improved in 2-3 days, follow-up with PCP. If symptoms worsen or new symptoms develop, report to the emergency department immediately.

## 2023-07-22 ENCOUNTER — OFFICE VISIT (OUTPATIENT)
Dept: URGENT CARE | Age: 33
End: 2023-07-22
Payer: COMMERCIAL

## 2023-07-22 VITALS
OXYGEN SATURATION: 99 % | DIASTOLIC BLOOD PRESSURE: 75 MMHG | HEART RATE: 94 BPM | SYSTOLIC BLOOD PRESSURE: 128 MMHG | RESPIRATION RATE: 18 BRPM | TEMPERATURE: 97.2 F

## 2023-07-22 DIAGNOSIS — H69.83 DYSFUNCTION OF BOTH EUSTACHIAN TUBES: Primary | ICD-10-CM

## 2023-07-22 PROCEDURE — 99213 OFFICE O/P EST LOW 20 MIN: CPT | Performed by: NURSE PRACTITIONER

## 2023-07-22 NOTE — PROGRESS NOTES
[facility]        NAME: Aquiles Forrest is a 28 y.o. female  : 1990    MRN: [id number]  DATE: 2023  TIME: 11:13 AM    Assessment and Plan   Dysfunction of both eustachian tubes [H69.83]  1. Dysfunction of both eustachian tubes              Patient Instructions     No sign of infection  Likely Eustachian tube dysfunction s/p ear infection  Decongestant OTC prn  Follow up with PCP in 3-5 days. Proceed to  ER if symptoms worsen. Chief Complaint     Chief Complaint   Patient presents with   • Earache     Patient states that she has had B/L ear pain since . She states that she got abx for possible ear infection  and that her right ear has improved but the left seems to be worsening. Notes frequent swimming          History of Present Illness       HPI   Reports intermittent pain in the bilateral ears. Worse in the left. Treated for bilateral ear infected x 7 days, starting 18 days ago, and treatment duration x 7 days. Review of Systems   Review of Systems   Constitutional: Negative for fever. HENT: Positive for ear pain. Negative for ear discharge, hearing loss and sneezing. Respiratory: Negative for chest tightness and shortness of breath. Cardiovascular: Negative for chest pain. Neurological: Negative for light-headedness.          Current Medications       Current Outpatient Medications:   •  norgestimate-ethinyl estradiol (ORTHO TRI-CYCLEN LO) 0.18/0.215/0.25 MG-25 MCG per tablet, Take 1 tablet by mouth daily, Disp: 84 tablet, Rfl: 3    Current Allergies     Allergies as of 2023 - Reviewed 2023   Allergen Reaction Noted   • Sulfa antibiotics Other (See Comments) and Hives 01/10/2010            The following portions of the patient's history were reviewed and updated as appropriate: allergies, current medications, past family history, past medical history, past social history, past surgical history and problem list.     Past Medical History:   Diagnosis Date   • Abnormal ECG 19   • Abnormal Pap smear of cervix 2020    Abnormal pap, negative HPV   • Herpes     genital herpes x 1  5 yrs ago -no other outbreaks   • Migraine     with previous pregnancy   • Miscarriage     chemical pregnancy   5 wks   • S/P  section 2020   • Varicella     childhood chickenpox       Past Surgical History:   Procedure Laterality Date   • ADENOIDECTOMY     •  SECTION  2020   • NC  DELIVERY ONLY N/A 2020    Procedure:  SECTION (); Surgeon: Codi Otero MD;  Location: [facility] ;  Service: Obstetrics   • TONSILLECTOMY     • WISDOM TOOTH EXTRACTION         Family History   Problem Relation Age of Onset   • Lymphoma Father    • Hypertension Father    • Cancer Father         recovered non hodskins lymphoma   • Osteoporosis Mother    • Hypertension Mother    • Supraventricular tachycardia Mother    • Breast cancer Maternal Grandmother    • Hypertension Maternal Grandmother    • Diabetes Maternal Grandmother    • Heart disease Maternal Grandmother    • Diabetes Paternal Grandmother    • Obesity Brother    • Angina Maternal Grandfather    • Diabetes Maternal Grandfather    • Cancer Maternal Grandfather    • Heart defect Maternal Grandfather    • Heart disease Maternal Grandfather    • Obesity Paternal Grandfather          Medications have been verified. Objective   /75   Pulse 94   Temp (!) 97.2 °F (36.2 °C)   Resp 18   SpO2 99%   No LMP recorded. (Menstrual status: Birth Control). Physical Exam     Physical Exam  HENT:      Right Ear: Tympanic membrane and external ear normal. There is no impacted cerumen. Left Ear: Tympanic membrane and external ear normal. There is no impacted cerumen. Nose: No rhinorrhea. Cardiovascular:      Rate and Rhythm: Regular rhythm. Heart sounds: Normal heart sounds. Pulmonary:      Effort: Pulmonary effort is normal.      Breath sounds: Normal breath sounds. Musculoskeletal:      Cervical back: No rigidity.

## 2023-09-29 ENCOUNTER — PROBLEM (OUTPATIENT)
Dept: URBAN - METROPOLITAN AREA CLINIC 6 | Facility: CLINIC | Age: 33
End: 2023-09-29

## 2023-09-29 DIAGNOSIS — H10.33: ICD-10-CM

## 2023-09-29 PROCEDURE — 92012 INTRM OPH EXAM EST PATIENT: CPT

## 2023-09-29 ASSESSMENT — TONOMETRY
OS_IOP_MMHG: 23
OD_IOP_MMHG: 25

## 2023-09-29 ASSESSMENT — VISUAL ACUITY
OD_CC: 20/20
OS_CC: 20/20

## 2023-11-24 ENCOUNTER — OFFICE VISIT (OUTPATIENT)
Dept: INTERNAL MEDICINE CLINIC | Facility: CLINIC | Age: 33
End: 2023-11-24
Payer: COMMERCIAL

## 2023-11-24 VITALS
BODY MASS INDEX: 27.96 KG/M2 | DIASTOLIC BLOOD PRESSURE: 78 MMHG | HEART RATE: 78 BPM | OXYGEN SATURATION: 99 % | HEIGHT: 64 IN | WEIGHT: 163.8 LBS | SYSTOLIC BLOOD PRESSURE: 122 MMHG

## 2023-11-24 DIAGNOSIS — G44.89 OTHER HEADACHE SYNDROME: Primary | ICD-10-CM

## 2023-11-24 PROCEDURE — 99214 OFFICE O/P EST MOD 30 MIN: CPT | Performed by: INTERNAL MEDICINE

## 2023-11-24 RX ORDER — SUMATRIPTAN 50 MG/1
50 TABLET, FILM COATED ORAL ONCE AS NEEDED
Qty: 10 TABLET | Refills: 5 | Status: SHIPPED | OUTPATIENT
Start: 2023-11-24

## 2023-11-24 NOTE — ASSESSMENT & PLAN NOTE
Neurologic exam grossly normal.  Will give prescription for abortive therapy with sumatriptan hand, discussed the possibility of preventative treatment also, but with patient having this new onset of severe headaches, imaging would be indicated, and also referral to neurology.

## 2023-11-24 NOTE — PROGRESS NOTES
Name: Juan F Gunderson      : 1990      MRN: [id number]  Encounter Provider: Iggy Pineda MD  Encounter Date: 2023   Encounter department: [facility]     1. Other headache syndrome  Assessment & Plan:  Neurologic exam grossly normal.  Will give prescription for abortive therapy with sumatriptan hand, discussed the possibility of preventative treatment also, but with patient having this new onset of severe headaches, imaging would be indicated, and also referral to neurology. Orders:  -     SUMAtriptan (IMITREX) 50 mg tablet; Take 1 tablet (50 mg total) by mouth once as needed for migraine may repeat in 2 hours if necessary  -     CT head wo contrast; Future; Expected date: 2023  -     Ambulatory referral to Neurology; Future      BMI Counseling: Body mass index is 28.12 kg/m². The BMI is above normal. Nutrition recommendations include encouraging healthy choices of fruits and vegetables and moderation in carbohydrate intake. Exercise recommendations include exercising 3-5 times per week. Rationale for BMI follow-up plan is due to patient being overweight or obese. Subjective     Patient here for concerns of headaches, over the past 3 years she reports about 1 "migraine" per month on average. She got a flu shot November 3, developed bad headache , and has had a bad headache about once a week since then. She usually takes ibuprofen and acetaminophen which helps. Headaches are described as sharp pain right side of head, unsure if throbbing, she does have some aura of foggy vision. Some associated nausea, no vomiting. No photophobia or phonophobia. Patient reports the headache is severe. No other neurologic complaints. Headache usually lasts about 4 hours. No new foods, meds, supplements, etc.  Stress levels are the same as her baseline. No urinary incontinence. Patient has not identified any triggers.     Migraine  Pertinent negatives include no fever or neck pain. Review of Systems   Constitutional:  Negative for chills and fever. HENT:  Negative for congestion. Musculoskeletal:  Negative for neck pain and neck stiffness. Neurological:  Positive for headaches. Past Medical History:   Diagnosis Date   • Abnormal ECG 19   • Abnormal Pap smear of cervix 2020    Abnormal pap, negative HPV   • Herpes     genital herpes x 1  5 yrs ago -no other outbreaks   • Migraine     with previous pregnancy   • Miscarriage     chemical pregnancy   5 wks   • S/P  section 2020   • Varicella     childhood chickenpox     Past Surgical History:   Procedure Laterality Date   • ADENOIDECTOMY     •  SECTION  2020   • CT  DELIVERY ONLY N/A 2020    Procedure:  SECTION ();   Surgeon: Alessandro Das MD;  Location: [facility] ;  Service: Obstetrics   • TONSILLECTOMY     • WISDOM TOOTH EXTRACTION       Family History   Problem Relation Age of Onset   • Lymphoma Father    • Hypertension Father    • Cancer Father         recovered non hodskins lymphoma   • Osteoporosis Mother    • Hypertension Mother    • Supraventricular tachycardia Mother    • Breast cancer Maternal Grandmother    • Hypertension Maternal Grandmother    • Diabetes Maternal Grandmother    • Heart disease Maternal Grandmother    • Diabetes Paternal Grandmother    • Obesity Brother    • Angina Maternal Grandfather    • Diabetes Maternal Grandfather    • Cancer Maternal Grandfather    • Heart defect Maternal Grandfather    • Heart disease Maternal Grandfather    • Obesity Paternal Grandfather      Social History     Socioeconomic History   • Marital status: /Civil Union     Spouse name: None   • Number of children: None   • Years of education: None   • Highest education level: None   Occupational History   • Occupation: Orthopedics   Tobacco Use   • Smoking status: Never   • Smokeless tobacco: Never   Vaping Use   • Vaping Use: Never used   Substance and Sexual Activity   • Alcohol use: Yes     Comment: socially- none with pregnancy   • Drug use: No   • Sexual activity: Yes     Partners: Male     Birth control/protection: OCP     Comment:    Other Topics Concern   • None   Social History Narrative   • None     Social Determinants of Health     Financial Resource Strain: Not on file   Food Insecurity: Not on file   Transportation Needs: Not on file   Physical Activity: Not on file   Stress: Not on file   Social Connections: Not on file   Intimate Partner Violence: Not on file   Housing Stability: Not on file     Current Outpatient Medications on File Prior to Visit   Medication Sig   • norgestimate-ethinyl estradiol (ORTHO TRI-CYCLEN LO) 0.18/0.215/0.25 MG-25 MCG per tablet Take 1 tablet by mouth daily     Allergies   Allergen Reactions   • Sulfa Antibiotics Other (See Comments) and Hives     Rash-as infant     Immunization History   Administered Date(s) Administered   • H1N1, All Formulations 01/09/2010   • HPV Quadrivalent 03/01/2007, 05/01/2007, 09/21/2007   • Hep B, adult 07/29/1994, 09/02/1994, 02/24/1995   • INFLUENZA 11/04/2014   • Meningococcal MCV4P 06/09/2006   • Tdap 02/23/2015, 04/21/2020   • Tuberculin Skin Test-PPD Intradermal 08/19/2009       Objective     /78 (BP Location: Left arm, Patient Position: Sitting, Cuff Size: Standard)   Pulse 78   Ht 5' 4" (1.626 m)   Wt 74.3 kg (163 lb 12.8 oz)   SpO2 99%   BMI 28.12 kg/m²     Physical Exam  Vitals reviewed. Constitutional:       General: She is not in acute distress. Appearance: Normal appearance. She is well-developed. HENT:      Head: Normocephalic and atraumatic. Right Ear: External ear normal.      Left Ear: External ear normal.   Eyes:      General: No scleral icterus. Conjunctiva/sclera: Conjunctivae normal.   Neck:      Thyroid: No thyromegaly. Trachea: No tracheal deviation.    Cardiovascular:      Rate and Rhythm: Normal rate and regular rhythm. Heart sounds: Normal heart sounds. Pulmonary:      Effort: Pulmonary effort is normal. No respiratory distress. Breath sounds: Normal breath sounds. No wheezing or rales. Abdominal:      General: Bowel sounds are normal.      Palpations: Abdomen is soft. Tenderness: There is no abdominal tenderness. There is no guarding or rebound. Musculoskeletal:      Cervical back: Normal range of motion and neck supple. Lymphadenopathy:      Cervical: No cervical adenopathy. Skin:     Coloration: Skin is not jaundiced or pale. Neurological:      Mental Status: She is alert and oriented to person, place, and time. Comments: Normal gait   Psychiatric:         Behavior: Behavior normal.         Thought Content:  Thought content normal.         Judgment: Judgment normal.       Fortino Gutierrez MD

## 2023-11-24 NOTE — PATIENT INSTRUCTIONS
Problem List Items Addressed This Visit          Other    Other headache syndrome - Primary     Neurologic exam grossly normal.  Will give prescription for abortive therapy with sumatriptan hand, discussed the possibility of preventative treatment also, but with patient having this new onset of severe headaches, imaging would be indicated, and also referral to neurology.          Relevant Medications    SUMAtriptan (IMITREX) 50 mg tablet    Other Relevant Orders    CT head wo contrast    Ambulatory referral to Neurology

## 2023-12-04 ENCOUNTER — HOSPITAL ENCOUNTER (OUTPATIENT)
Dept: CT IMAGING | Facility: HOSPITAL | Age: 33
Discharge: HOME | End: 2023-12-04
Payer: COMMERCIAL

## 2023-12-04 DIAGNOSIS — G44.89 OTHER HEADACHE SYNDROME: ICD-10-CM

## 2023-12-04 PROCEDURE — G1004 CDSM NDSC: HCPCS

## 2023-12-04 PROCEDURE — 70450 CT HEAD/BRAIN W/O DYE: CPT

## 2024-01-09 ENCOUNTER — ANNUAL EXAM (OUTPATIENT)
Age: 34
End: 2024-01-09
Payer: COMMERCIAL

## 2024-01-09 VITALS
WEIGHT: 168.2 LBS | DIASTOLIC BLOOD PRESSURE: 64 MMHG | SYSTOLIC BLOOD PRESSURE: 126 MMHG | HEIGHT: 62 IN | BODY MASS INDEX: 30.95 KG/M2

## 2024-01-09 DIAGNOSIS — Z30.41 ENCOUNTER FOR SURVEILLANCE OF CONTRACEPTIVE PILLS: ICD-10-CM

## 2024-01-09 DIAGNOSIS — Z01.419 ENCOUNTER FOR ANNUAL ROUTINE GYNECOLOGICAL EXAMINATION: Primary | ICD-10-CM

## 2024-01-09 DIAGNOSIS — Z11.51 SCREENING FOR HUMAN PAPILLOMAVIRUS (HPV): ICD-10-CM

## 2024-01-09 PROBLEM — Z00.00 WELLNESS EXAMINATION: Status: RESOLVED | Noted: 2022-07-05 | Resolved: 2024-01-09

## 2024-01-09 PROCEDURE — G0145 SCR C/V CYTO,THINLAYER,RESCR: HCPCS | Performed by: OBSTETRICS & GYNECOLOGY

## 2024-01-09 PROCEDURE — S0612 ANNUAL GYNECOLOGICAL EXAMINA: HCPCS | Performed by: OBSTETRICS & GYNECOLOGY

## 2024-01-09 PROCEDURE — G0476 HPV COMBO ASSAY CA SCREEN: HCPCS | Performed by: OBSTETRICS & GYNECOLOGY

## 2024-01-09 RX ORDER — NORGESTIMATE AND ETHINYL ESTRADIOL 0.25-0.035
1 KIT ORAL DAILY
Qty: 84 TABLET | Refills: 3 | Status: SHIPPED | OUTPATIENT
Start: 2024-01-09

## 2024-01-09 NOTE — PROGRESS NOTES
Assessment/Plan:    1. Encounter for annual routine gynecological examination    - Liquid-based pap, screening    2. Screening for human papillomavirus (HPV)    - Liquid-based pap, screening    3. Encounter for surveillance of contraceptive pills    - norgestimate-ethinyl estradiol (MonoNessa) 0.25-35 MG-MCG per tablet; Take 1 tablet by mouth daily  Dispense: 84 tablet; Refill: 3      Subjective      Chelsy Isabel is a 33 y.o. female who presents for annual exam. Periods are irregular - sometimes they come, sometimes they do not.  She denies any breast, urinary or sexual concerns.    Current contraception: OCP (estrogen/progesterone)  History of abnormal Pap smear: no  Regular self breast exam: yes  History of abnormal mammogram: no  History of abnormal lipids: no    Menstrual History:  OB History          3    Para   2    Term   2       0    AB   1    Living   2         SAB   1    IAB   0    Ectopic   0    Multiple   0    Live Births   2                Patient's last menstrual period was 2024 (exact date).  Menstrual Flow: Light  Menstrual Control: Tampon  Menstrual Control Change Freq (Hours): 8  Dysmenorrhea: (!) Mild  Dysmenorrhea Symptoms: Headache    Past Medical History:   Diagnosis Date    Abnormal ECG 19    Abnormal Pap smear of cervix 2020    Abnormal pap, negative HPV    Herpes     genital herpes x 1  5 yrs ago -no other outbreaks    Migraine     with previous pregnancy    Miscarriage     chemical pregnancy   5 wks    S/P  section 2020    Varicella     childhood chickenpox       Family History   Problem Relation Age of Onset    Lymphoma Father     Hypertension Father     Cancer Father         recovered non hodskins lymphoma    Osteoporosis Mother     Hypertension Mother     Supraventricular tachycardia Mother     Breast cancer Maternal Grandmother     Hypertension Maternal Grandmother     Diabetes Maternal Grandmother     Heart disease Maternal Grandmother      "Diabetes Paternal Grandmother     Obesity Brother     Angina Maternal Grandfather     Diabetes Maternal Grandfather     Cancer Maternal Grandfather     Heart defect Maternal Grandfather     Heart disease Maternal Grandfather     Obesity Paternal Grandfather        The following portions of the patient's history were reviewed and updated as appropriate: allergies, current medications, past family history, past medical history, past social history, past surgical history, and problem list.    Review of Systems  Pertinent items are noted in HPI.      Objective      /64 (BP Location: Right arm, Patient Position: Sitting, Cuff Size: Large)   Ht 5' 2\" (1.575 m)   Wt 76.3 kg (168 lb 3.2 oz)   LMP 01/03/2024 (Exact Date)   BMI 30.76 kg/m²     General:   alert and oriented, in no acute distress   Heart:  Breasts: regular rate and rhythm  appear normal, no suspicious masses, no skin or nipple changes or axillary nodes.   Lungs: Effort normal   Abdomen: soft, non-tender, without masses or organomegaly   Vulva: normal   Vagina: normal mucosa; scant menstrual blood   Cervix: no lesions   Uterus: normal size, mobile, non-tender   Adnexa: normal adnexa and no mass, fullness, tenderness               "

## 2024-01-15 LAB
LAB AP GYN PRIMARY INTERPRETATION: NORMAL
LAB AP LMP: NORMAL
Lab: NORMAL

## 2024-01-17 ENCOUNTER — TELEPHONE (OUTPATIENT)
Dept: NEUROLOGY | Facility: CLINIC | Age: 34
End: 2024-01-17

## 2024-01-17 NOTE — TELEPHONE ENCOUNTER
Patient called to cancel and reschedule 9am with Ian 1/17/2024 due to snow and kids' school.  Rescheduled.

## 2024-01-29 ENCOUNTER — CONSULT (OUTPATIENT)
Dept: NEUROLOGY | Facility: CLINIC | Age: 34
End: 2024-01-29
Payer: COMMERCIAL

## 2024-01-29 VITALS
SYSTOLIC BLOOD PRESSURE: 132 MMHG | BODY MASS INDEX: 31.19 KG/M2 | TEMPERATURE: 97.3 F | HEART RATE: 80 BPM | WEIGHT: 169.5 LBS | HEIGHT: 62 IN | DIASTOLIC BLOOD PRESSURE: 84 MMHG

## 2024-01-29 DIAGNOSIS — G44.89 OTHER HEADACHE SYNDROME: Primary | ICD-10-CM

## 2024-01-29 PROCEDURE — 99245 OFF/OP CONSLTJ NEW/EST HI 55: CPT | Performed by: PSYCHIATRY & NEUROLOGY

## 2024-01-29 NOTE — PROGRESS NOTES
NEUROLOGY OUTPATIENT - NEW PATIENT VISIT NOTE        NAME: Chelsy Isabel  MRN: [id number]  : 1990     TODAY'S DATE: 24         HISTORY OF PRESENT ILLNESS (HPI):    Ms. Isabel is a 33 y.o. female presenting with Migraine and Results      HEADACHE DESCRIPTION:  (Stabbing headache)  Onset of headaches: Suddenly around Nov after she got the flu shot  Location of headaches: Right side of the temple  Radiation: No   Quality of the pain:  stabbing head  Intensity of the headache: At present: 0/10;  At its worst:  10/10  Duration of the headaches:hour(s)  Frequency of the headaches: 4 times in month in Nov and then it resolved.   Presence of aura:  Yes  Associated symptoms with headaches: no vomiting , no sound sensitivity , +nausea, and +light sensitivity --there was some concern about the brain fog as well.   Triggers:N/A   Positional component: No   Alleviating factors: No   Any specific time of the day when get the headaches: no particular time of day    Family history of migraine headaches: Yes  History of neck and head trauma: No  Smoking status: No  Oral contraceptive use: Yes    Life style factors:  -Hydration: adequate  -Sleep: adequate  -Caffeine intake: one cup in the morning  -Alcohol intake: socially     Impact of headches:  -Number of headaches in past 30 days: none/30 days.   -Number of days missed per month because of headache: none in the last 30 days.   -Number of ER visits for her headaches: none  in the last 30 days.       Treatment:  -Over the counter medications tried for headaches:   Tylenol been ineffective and Ibuprofen been ineffective     -Prescription medications:  Abortive or Rescue Medications used:   Imitrex did not took it     Preventative or daily medications used for headaches:   No prophylactic medicine tried in the past           She also gets the typical migraine headaches around her menstrual cycle which are well controlled with Tylenol and Ibuprofen.     Red Flags for  "headache:  Age <5 or >50: No  First worst headaches: No  Maximal at intensity: No  Change in pattern: No  New headache in pregnancy, cancer or immunocompromised patient: No  Loss of consciousness or convulsions: No  Headache triggered by exertion, Valsalva maneuver or sexual activity: No  Abnormal exam: please see below in Neurological examination.    OUTSIDE RECORDS:    historical medical records  Patient here for concerns of headaches, over the past 3 years she reports about 1 \"migraine\" per month on average.  She got a flu shot November 3, developed bad headache November 8, and has had a bad headache about once a week since then.  She usually takes ibuprofen and acetaminophen which helps.  Headaches are described as sharp pain right side of head, unsure if throbbing, she does have some aura of foggy vision.  Some associated nausea, no vomiting.  No photophobia or phonophobia.  Patient reports the headache is severe.  No other neurologic complaints.  Headache usually lasts about 4 hours.  No new foods, meds, supplements, etc.  Stress levels are the same as her baseline.  No urinary incontinence.  Patient has not identified any triggers.   CURRENT OUTPATIENT MEDICATIONS:    Chelsy Isabel has a current medication list which includes the following prescription(s): norgestimate-ethinyl estradiol and sumatriptan.       PHYSICAL EXAMINATION:   VITAL SIGNS:  height is 5' 2\" (1.575 m) and weight is 76.9 kg (169 lb 8 oz). Her temporal temperature is 97.3 °F (36.3 °C) (abnormal). Her blood pressure is 132/84 and her pulse is 80.        NEUROLOGICAL EXAMINATION:    MENTAL STATUS EXAM: Alert, oriented to time place and person     CRANIAL NERVE EXAMINATION:  I Not tested   II Normal visual fields to finger counting.   II, Ill,  IV, VI Pupils were symmetric, briskly reactive. No afferent pupillary defect.  Eye movements are full without nystagmus. No ptosis.   V Facial sensation were intact   VII Facial movements were " "symmetrical    VIII Hearing was intact   IX, X Intact   XI Shoulder shrug and head turn was normal   XII Tongue protrusion is in the mid line.          MOTOR EXAM:        Arm Right  Left Leg Right  Left   Deltoid 5/5 5/5 lliopsoas 5/5 5/5   Biceps 5/5 5/5 Quads 5/5 5/5   Triceps 5/5 5/5 Hamstrings 5/5 5/5   Wrist Extension 5/5 5/5 Ankle Dorsi Flexion 5/5 5/5   Wrist Flexion 5/5 5/5 Ankle Plantar Flexion 5/5 5/5               DEEP TENDON REFLEXES:     Brachioradialis Biceps Triceps Patellar Achilles   Right 2+ 2+ 2+ 3+ 1+   Left 2+ 2+ 2+ 3+ 1+            SENSORY EXAMINATION:   Sensation to dull touch Intact     COORDINATION:   normal finger to nose testing     GAIT/STATION:   normal        DIAGNOSTIC STUDIES:   PERTINENT LABS:     Lab Results   Component Value Date    WBC 7.67 06/29/2022     Lab Results   Component Value Date    HGB 14.5 06/29/2022     Lab Results   Component Value Date     06/29/2022     Lab Results   Component Value Date    LYMPHSABS 2.06 06/29/2022     No results found for: \"LABLYMP\"  Lab Results   Component Value Date    EOSABS 0.29 06/29/2022     No components found for: \"NEUTROPHILS\"    No results found for: \"LABMONO\"         No results found for: \"LABGLUC\"  Lab Results   Component Value Date    CREATININE 0.75 06/29/2022     Lab Results   Component Value Date    AST 13 06/29/2022     Lab Results   Component Value Date    ALT 22 06/29/2022     Lab Results   Component Value Date    ALKPHOS 59 06/29/2022     Lab Results   Component Value Date    AST 13 06/29/2022        No results found for: \"FOLATE\"  No results found for: \"XXXWDLOK48\"  No results found for: \"COPPER\"  No results found for: \"METHYLMALON\"  No results found for: \"VITAMINB6\"  No components found for: \"MIOPGLJI4YZ\"  No components found for: \"VITAMINE\"  No components found for: \"ANADIRECT\"     No components found for: \"HIVPCR\"     No components found for: \"GLUCOSECSF\"  No components found for: \"CSFINTERP\"  No results found for: " "\"RBCCSF\"  No results found for: \"WBCCSF\"  No results found for: \"IGGSYNRATE\"  No components found for: \"IGGINDEX\"  No results found for: \"PROTEINCSF\"  No components found for: \"CSFMONO\"  No components found for: \"FUNGUSCX\"  No components found for: \"CSFCS\"  No results found for: \"CRYPTOAG\"  No components found for: \"DRLCSF\"  No components found for: \"FLOWCYTEVAL\"     Lab Results   Component Value Date    HEPBSAG Non-reactive 2019            NEUROIMAGING:        CT BRAIN - WITHOUT CONTRAST     INDICATION:   G44.89: Other headache syndrome.     COMPARISON:  None.     TECHNIQUE:  CT examination of the brain was performed.  Multiplanar 2D reformatted images were created from the source data.     Radiation dose length product (DLP) for this visit:  836 mGy-cm .  This examination, like all CT scans performed in the [facility], was performed utilizing techniques to minimize radiation dose exposure, including the use of iterative   reconstruction and automated exposure control.     IMAGE QUALITY:  Diagnostic.     FINDINGS:     PARENCHYMA:  No intracranial mass, mass effect or midline shift. No CT signs of acute infarction.  No acute parenchymal hemorrhage.     VENTRICLES AND EXTRA-AXIAL SPACES:  Normal for the patient's age.     VISUALIZED ORBITS: Normal visualized orbits.     PARANASAL SINUSES: Normal visualized paranasal sinuses.     CALVARIUM AND EXTRACRANIAL SOFT TISSUES:  Normal.     IMPRESSION:     No acute intracranial hemorrhage, mass effect or edema.       ASSESSMENT AND PLAN:    Ms. Isabel is a 33 y.o.female  presenting with 4 episodes of stabbing right-sided headache after receiving a flu shot in November. Patient  has a past medical history of Abnormal ECG (19), Abnormal Pap smear of cervix (2020), Herpes, Migraine, Miscarriage, S/P  section (2020), and Varicella.. Neurological exam is concerning for brisk reflexes in the bilateral lower extremities but symmetrical. " Neuroimaging including CT head came back within normal limits.  Unsure about the exact etiology of her stabbing headaches but my concern is about any intracranial pathology that might have been contributing towards the stabbing headaches after the flu shot and concern for trigeminal neuralgia or atypical migraine headaches.     Other headache syndrome  Unsure about the exact etiology but would recommend getting an MRI of the brain to rule out any intracranial pathology  - Ambulatory referral to Neurology  - MRI brain without contrast; Future           Return if symptoms worsen or fail to improve.       The management plan was discussed in detail with the patient and all questions were answered.     Ms. Isabel was encouraged to contact our office with any questions or concerns and to contact the clinic or go to the nearest emergency room if symptoms change or worsen.       I have spent a total of 61 min in reviewing and/or ordering tests, medications, or procedures, performing an examination or evaluation, reviewing pertinent history, counseling and educating the patient, referring and/or communicating with other health care professionals, documenting in the EMR and general coordination of care of Ms. Isabel  today.           Garfield Sosa MD.   Staff Neurologist,   Neuroimmunology and Neuroinfectious disease  01/29/24     This report has been created through the use of voice recognition/text compilation software.  Typographical and content errors may occur with this process.  While efforts are made to detect and correct such errors, in some cases errors will persist.  For this reason, wording in this document should be considered in the proper context and not strictly verbatim.  If, when reviewing the document, an error is discovered, please let the office know at [phone]

## 2024-01-29 NOTE — PROGRESS NOTES
Review of Systems   Constitutional:  Negative for appetite change, fatigue and fever.   HENT: Negative.  Negative for hearing loss, tinnitus, trouble swallowing and voice change.    Eyes: Negative.  Negative for photophobia, pain and visual disturbance.   Respiratory: Negative.  Negative for shortness of breath.    Cardiovascular: Negative.  Negative for palpitations.   Gastrointestinal: Negative.  Negative for nausea and vomiting.   Endocrine: Negative.  Negative for cold intolerance.   Genitourinary: Negative.  Negative for dysuria, frequency and urgency.   Musculoskeletal:  Negative for back pain, gait problem, myalgias, neck pain and neck stiffness.   Skin: Negative.  Negative for rash.   Allergic/Immunologic: Negative.    Neurological:  Positive for headaches. Negative for dizziness, tremors, seizures, syncope, facial asymmetry, speech difficulty, weakness, light-headedness and numbness.   Hematological: Negative.  Does not bruise/bleed easily.   Psychiatric/Behavioral: Negative.  Negative for confusion, hallucinations and sleep disturbance.    All other systems reviewed and are negative.

## 2024-02-02 NOTE — PATIENT INSTRUCTIONS
Continue nasocort daily  Azithromycin 250mg 2 tablets today then 1 tablet daily for the next 4 days  Increase fluid intake 02-Feb-2024 14:33

## 2024-02-26 ENCOUNTER — OFFICE VISIT (OUTPATIENT)
Dept: INTERNAL MEDICINE CLINIC | Facility: CLINIC | Age: 34
End: 2024-02-26
Payer: COMMERCIAL

## 2024-02-26 VITALS
HEIGHT: 62 IN | HEART RATE: 80 BPM | OXYGEN SATURATION: 99 % | SYSTOLIC BLOOD PRESSURE: 110 MMHG | DIASTOLIC BLOOD PRESSURE: 72 MMHG | BODY MASS INDEX: 30.25 KG/M2 | WEIGHT: 164.4 LBS

## 2024-02-26 DIAGNOSIS — G44.89 OTHER HEADACHE SYNDROME: Primary | ICD-10-CM

## 2024-02-26 DIAGNOSIS — N62 LARGE BREASTS: ICD-10-CM

## 2024-02-26 PROCEDURE — 99214 OFFICE O/P EST MOD 30 MIN: CPT | Performed by: INTERNAL MEDICINE

## 2024-02-26 NOTE — ASSESSMENT & PLAN NOTE
Reports getting some intermittent upper back pain from this, she has done physical therapy for this in the past and has ongoing issues.  She wonders if perhaps sometimes she gets headache related to this.  She is going to get evaluation with plastic surgeon for the possibility of breast reduction

## 2024-02-26 NOTE — PROGRESS NOTES
Name: Chelsy Isabel      : 1990      MRN: [id number]  Encounter Provider: Ted Portillo MD  Encounter Date: 2024   Encounter department: [facility]    Assessment & Plan     1. Other headache syndrome  Assessment & Plan:  Pt has been doing well.  She did see neurology, an MRI was ordered, and she is considering getting that done.  She has not needed to use the sumatriptan.  He seemed to have these headaches after the flu shot in November, she has been doing well since that month.      2. Large breasts  Assessment & Plan:  Reports getting some intermittent upper back pain from this, she has done physical therapy for this in the past and has ongoing issues.  She wonders if perhaps sometimes she gets headache related to this.  She is going to get evaluation with plastic surgeon for the possibility of breast reduction          Depression Screening and Follow-up Plan: Patient was screened for depression during today's encounter. They screened negative with a PHQ-2 score of 0.        Subjective     Patient here for follow-up, she has not been having headaches like she had which prompted her last visit.  She had a flu shot and had headaches for about a month after that and thinks perhaps the headache could be related to that.  No change in stress levels, no change in diet, patient has not needed to use the sumatriptan.  She has seen neurology.  Pt is also considering breast reduction.  She has looked into this in the past.  She has done physical therapy for the past, wonders if this could possibly cause headaches at times.  She does get some upper back pain also, and reports having a poor posture.      Review of Systems   Constitutional:  Negative for chills, fatigue and fever.   HENT:  Negative for congestion, nosebleeds, postnasal drip, sore throat and trouble swallowing.    Eyes:  Negative for pain.   Respiratory:  Negative for cough, chest tightness, shortness of breath and  wheezing.    Cardiovascular:  Negative for chest pain, palpitations and leg swelling.   Gastrointestinal:  Negative for abdominal pain, constipation, diarrhea, nausea and vomiting.   Endocrine: Negative for polydipsia and polyuria.   Genitourinary:  Negative for dysuria, flank pain and hematuria.   Musculoskeletal:  Negative for arthralgias, back pain and myalgias.   Skin:  Negative for rash.   Neurological:  Negative for dizziness, tremors, light-headedness and headaches.   Hematological:  Does not bruise/bleed easily.   Psychiatric/Behavioral:  Negative for confusion and dysphoric mood. The patient is not nervous/anxious.        Past Medical History:   Diagnosis Date   • Abnormal ECG 19   • Abnormal Pap smear of cervix 2020    Abnormal pap, negative HPV   • Herpes     genital herpes x 1  5 yrs ago -no other outbreaks   • Migraine     with previous pregnancy   • Miscarriage     chemical pregnancy   5 wks   • S/P  section 2020   • Varicella     childhood chickenpox     Past Surgical History:   Procedure Laterality Date   • ADENOIDECTOMY     •  SECTION  2020   • [place]  DELIVERY ONLY N/A 2020    Procedure:  SECTION ();  Surgeon: Lorelei Hogan MD;  Location: [facility] ;  Service: Obstetrics   • TONSILLECTOMY     • WISDOM TOOTH EXTRACTION       Family History   Problem Relation Age of Onset   • Lymphoma Father    • Hypertension Father    • Cancer Father         recovered non hodskins lymphoma   • Osteoporosis Mother    • Hypertension Mother    • Supraventricular tachycardia Mother    • Breast cancer Maternal Grandmother    • Hypertension Maternal Grandmother    • Diabetes Maternal Grandmother    • Heart disease Maternal Grandmother    • Diabetes Paternal Grandmother    • Obesity Brother    • Angina Maternal Grandfather    • Diabetes Maternal Grandfather    • Cancer Maternal Grandfather    • Heart defect Maternal Grandfather    • Heart disease Maternal  "Grandfather    • Obesity Paternal Grandfather      Social History     Socioeconomic History   • Marital status: /Civil Union     Spouse name: None   • Number of children: None   • Years of education: None   • Highest education level: None   Occupational History   • Occupation: Orthopedics   Tobacco Use   • Smoking status: Never   • Smokeless tobacco: Never   Vaping Use   • Vaping status: Never Used   Substance and Sexual Activity   • Alcohol use: Yes     Comment: socially- none with pregnancy   • Drug use: No   • Sexual activity: Yes     Partners: Male     Birth control/protection: OCP     Comment:    Other Topics Concern   • None   Social History Narrative   • None     Social Determinants of Health     Financial Resource Strain: Not on file   Food Insecurity: Not on file   Transportation Needs: Not on file   Physical Activity: Not on file   Stress: Not on file   Social Connections: Not on file   Intimate Partner Violence: Not on file   Housing Stability: Not on file     Current Outpatient Medications on File Prior to Visit   Medication Sig   • norgestimate-ethinyl estradiol (MonoNessa) 0.25-35 MG-MCG per tablet Take 1 tablet by mouth daily   • SUMAtriptan (IMITREX) 50 mg tablet Take 1 tablet (50 mg total) by mouth once as needed for migraine may repeat in 2 hours if necessary     Allergies   Allergen Reactions   • Sulfa Antibiotics Other (See Comments) and Hives     Rash-as infant     Immunization History   Administered Date(s) Administered   • H1N1, All Formulations 01/09/2010   • HPV Quadrivalent 03/01/2007, 05/01/2007, 09/21/2007   • Hep B, adult 07/29/1994, 09/02/1994, 02/24/1995   • INFLUENZA 11/04/2014   • Meningococcal MCV4P 06/09/2006   • Tdap 02/23/2015, 04/21/2020   • Tuberculin Skin Test-PPD Intradermal 08/19/2009       Objective     /72 (BP Location: Left arm, Patient Position: Sitting, Cuff Size: Large)   Pulse 80   Ht 5' 2\" (1.575 m)   Wt 74.6 kg (164 lb 6.4 oz)   SpO2 99%   " BMI 30.07 kg/m²     Physical Exam  Vitals reviewed.   Constitutional:       General: She is not in acute distress.     Appearance: Normal appearance. She is well-developed.   HENT:      Head: Normocephalic and atraumatic.      Right Ear: External ear normal.      Left Ear: External ear normal.   Eyes:      General: No scleral icterus.     Conjunctiva/sclera: Conjunctivae normal.   Neck:      Thyroid: No thyromegaly.      Trachea: No tracheal deviation.   Cardiovascular:      Rate and Rhythm: Normal rate and regular rhythm.      Heart sounds: Normal heart sounds. No murmur heard.  Pulmonary:      Effort: Pulmonary effort is normal. No respiratory distress.      Breath sounds: Normal breath sounds. No wheezing or rales.   Abdominal:      General: Bowel sounds are normal.      Palpations: Abdomen is soft.      Tenderness: There is no abdominal tenderness. There is no guarding or rebound.   Musculoskeletal:      Cervical back: Normal range of motion and neck supple.      Right lower leg: No edema.      Left lower leg: No edema.   Lymphadenopathy:      Cervical: No cervical adenopathy.   Skin:     Coloration: Skin is not jaundiced or pale.   Neurological:      General: No focal deficit present.      Mental Status: She is alert and oriented to person, place, and time.   Psychiatric:         Mood and Affect: Mood normal.         Behavior: Behavior normal.         Thought Content: Thought content normal.         Judgment: Judgment normal.       Ted Portillo MD

## 2024-02-26 NOTE — ASSESSMENT & PLAN NOTE
Pt has been doing well.  She did see neurology, an MRI was ordered, and she is considering getting that done.  She has not needed to use the sumatriptan.  He seemed to have these headaches after the flu shot in November, she has been doing well since that month.

## 2024-02-26 NOTE — PATIENT INSTRUCTIONS
Problem List Items Addressed This Visit          Other    Other headache syndrome - Primary     Pt has been doing well.  She did see neurology, an MRI was ordered, and she is considering getting that done.  She has not needed to use the sumatriptan.  He seemed to have these headaches after the flu shot in November, she has been doing well since that month.         Large breasts     Reports getting some intermittent upper back pain from this, she has done physical therapy for this in the past and has ongoing issues.  She wonders if perhaps sometimes she gets headache related to this.  She is going to get evaluation with plastic surgeon for the possibility of breast reduction

## 2024-04-03 NOTE — PROGRESS NOTES
OB POSTPARTUM VISIT PROGRESS NOTE  Date of Encounter: 2020    Cate Feliz    : 1990  (34 y o )  MR: [id number]    Alphonso Chen is in for her postpartum visit  She is now [id number]  She delivered by primary  section  She's generally doing well, denies current pain or bleeding issues, and has no significant depression issues  She is bottle feeding  We discussed all appropriate contraceptive options and she chooses OCP  Objective   EXAM:  VITALS: Blood pressure 102/62, temperature 97 5 °F (36 4 °C), temperature source Tympanic, weight 72 8 kg (160 lb 6 4 oz), last menstrual period 10/03/2019, not currently breastfeeding  BMI: Body mass index is 29 34 kg/m²  Physical Exam   Constitutional: She is oriented to person, place, and time  She appears well-developed and well-nourished  HENT:   Head: Normocephalic  Cardiovascular: Normal rate and regular rhythm  Pulmonary/Chest: Effort normal and breath sounds normal    Abdominal: Soft  There is no tenderness  Wound - C/D/I; well-healed   Musculoskeletal: She exhibits no edema  Neurological: She is alert and oriented to person, place, and time  Skin: Skin is warm and dry  Psychiatric: She has a normal mood and affect  Vitals reviewed  Assessment/Plan   Diagnoses and all orders for this visit:    Encounter for initial prescription of contraceptive pills  -     norgestimate-ethinyl estradiol (ORTHO TRI-CYCLEN LO) 0 18/0 215/0 25 MG-25 MCG per tablet;  Take 1 tablet by mouth daily    RTO 3 months for annual    Valley MD Nimesh LR

## 2024-04-08 ENCOUNTER — PROBLEM (OUTPATIENT)
Dept: URBAN - METROPOLITAN AREA CLINIC 6 | Facility: CLINIC | Age: 34
End: 2024-04-08

## 2024-04-08 DIAGNOSIS — H04.123: ICD-10-CM

## 2024-04-08 PROCEDURE — 92012 INTRM OPH EXAM EST PATIENT: CPT

## 2024-04-08 ASSESSMENT — VISUAL ACUITY
OU_CC: 20/20
OS_CC: 20/25
OD_CC: 20/20

## 2024-04-08 ASSESSMENT — TONOMETRY
OD_IOP_MMHG: 22
OS_IOP_MMHG: 23

## 2024-06-17 ENCOUNTER — HOSPITAL ENCOUNTER (OUTPATIENT)
Dept: MRI IMAGING | Facility: HOSPITAL | Age: 34
Discharge: HOME | End: 2024-06-17
Attending: PSYCHIATRY & NEUROLOGY
Payer: COMMERCIAL

## 2024-06-17 DIAGNOSIS — G44.89 OTHER HEADACHE SYNDROME: ICD-10-CM

## 2024-06-17 PROCEDURE — G1004 CDSM NDSC: HCPCS

## 2024-06-17 PROCEDURE — 70551 MRI BRAIN STEM W/O DYE: CPT

## 2024-06-19 ENCOUNTER — TRANSCRIBE ORDERS (OUTPATIENT)
Dept: LAB | Facility: AMBULARY SURGERY CENTER | Age: 34
End: 2024-06-19

## 2024-06-19 ENCOUNTER — APPOINTMENT (OUTPATIENT)
Dept: LAB | Facility: AMBULARY SURGERY CENTER | Age: 34
End: 2024-06-19

## 2024-06-19 DIAGNOSIS — Z00.8 OTHER SPECIFIED GENERAL MEDICAL EXAMINATION: ICD-10-CM

## 2024-06-19 DIAGNOSIS — Z00.8 OTHER SPECIFIED GENERAL MEDICAL EXAMINATION: Primary | ICD-10-CM

## 2024-06-19 LAB
CHOLEST SERPL-MCNC: 219 MG/DL
EST. AVERAGE GLUCOSE BLD GHB EST-MCNC: 103 MG/DL
HBA1C MFR BLD: 5.2 %
HDLC SERPL-MCNC: 67 MG/DL
LDLC SERPL CALC-MCNC: 125 MG/DL (ref 0–100)
NONHDLC SERPL-MCNC: 152 MG/DL
TRIGL SERPL-MCNC: 135 MG/DL

## 2024-06-19 PROCEDURE — 36415 COLL VENOUS BLD VENIPUNCTURE: CPT

## 2024-06-19 PROCEDURE — 83036 HEMOGLOBIN GLYCOSYLATED A1C: CPT

## 2024-06-19 PROCEDURE — 80061 LIPID PANEL: CPT

## 2024-09-10 ENCOUNTER — APPOINTMENT (OUTPATIENT)
Dept: LAB | Facility: AMBULARY SURGERY CENTER | Age: 34
End: 2024-09-10
Payer: COMMERCIAL

## 2024-09-10 ENCOUNTER — APPOINTMENT (OUTPATIENT)
Dept: RADIOLOGY | Facility: AMBULARY SURGERY CENTER | Age: 34
End: 2024-09-10
Payer: COMMERCIAL

## 2024-09-10 ENCOUNTER — OFFICE VISIT (OUTPATIENT)
Dept: OBGYN CLINIC | Facility: CLINIC | Age: 34
End: 2024-09-10
Payer: COMMERCIAL

## 2024-09-10 VITALS — WEIGHT: 164 LBS | HEIGHT: 62 IN | BODY MASS INDEX: 30.18 KG/M2

## 2024-09-10 DIAGNOSIS — M25.571 PAIN, JOINT, ANKLE AND FOOT, RIGHT: ICD-10-CM

## 2024-09-10 DIAGNOSIS — S93.491A SPRAIN OF ANTERIOR TALOFIBULAR LIGAMENT OF RIGHT ANKLE, INITIAL ENCOUNTER: Primary | ICD-10-CM

## 2024-09-10 PROCEDURE — 99204 OFFICE O/P NEW MOD 45 MIN: CPT | Performed by: PHYSICAL MEDICINE & REHABILITATION

## 2024-09-10 PROCEDURE — 73610 X-RAY EXAM OF ANKLE: CPT

## 2024-09-10 NOTE — PROGRESS NOTES
1. Sprain of anterior talofibular ligament of right ankle, initial encounter    2. Pain, joint, ankle and foot, right      Orders Placed This Encounter   Procedures    XR ankle 3+ vw right     No orders of the defined types were placed in this encounter.      Impression:  Right ankle pain likely secondary to ATFL sprain with date of injury is 9/10/2024.  We discussed different treatment options and decided to proceed with ankle lace up brace for weightbearing.  She will ice and elevate over the next 2-3 days at least.  She can also do over-the-counter anti-inflammatories.  I will see her back in 3 weeks if still symptomatic.    Imaging Studies (I personally reviewed images in PACS and report):  Right ankle x-rays most recent to this encounter reviewed.  These images show no acute osseous abnormalities.    No follow-ups on file.    Patient is in agreement with the above plan.    HPI:  Chelsy Isabel is a 33 y.o. female  who presents for evaluation of   Chief Complaint   Patient presents with    Right Ankle - Pain       Onset/Mechanism: 9/10/2024-she was looking at a helicopter and she plantarflexed her foot.  Location: Anterolateral ankle.  Radiation: Denies.  Provocative: Weightbearing.  Severity: Painful.  Associated Symptoms: Denies.  Treatment so far: Activity modification.    Following history reviewed and updated:  Past Medical History:   Diagnosis Date    Abnormal ECG 19    Abnormal Pap smear of cervix 2020    Abnormal pap, negative HPV    Herpes     genital herpes x 1  5 yrs ago -no other outbreaks    Migraine     with previous pregnancy    Miscarriage     chemical pregnancy   5 wks    S/P  section 2020    Varicella     childhood chickenpox     Past Surgical History:   Procedure Laterality Date    ADENOIDECTOMY       SECTION  2020    [place]  DELIVERY ONLY N/A 2020    Procedure:  SECTION ();  Surgeon: Lorelei Hogan MD;  Location: [facility] ;  Service:  "Obstetrics    TONSILLECTOMY  2013    WISDOM TOOTH EXTRACTION       Social History   Social History     Substance and Sexual Activity   Alcohol Use Yes    Comment: socially- none with pregnancy     Social History     Substance and Sexual Activity   Drug Use No     Social History     Tobacco Use   Smoking Status Never   Smokeless Tobacco Never     Family History   Problem Relation Age of Onset    Lymphoma Father     Hypertension Father     Cancer Father         recovered non hodskins lymphoma    Osteoporosis Mother     Hypertension Mother     Supraventricular tachycardia Mother     Breast cancer Maternal Grandmother     Hypertension Maternal Grandmother     Diabetes Maternal Grandmother     Heart disease Maternal Grandmother     Diabetes Paternal Grandmother     Obesity Brother     Angina Maternal Grandfather     Diabetes Maternal Grandfather     Cancer Maternal Grandfather     Heart defect Maternal Grandfather     Heart disease Maternal Grandfather     Obesity Paternal Grandfather      Allergies   Allergen Reactions    Sulfa Antibiotics Other (See Comments) and Hives     Rash-as infant        Constitutional:  Ht 5' 2\" (1.575 m)   Wt 74.4 kg (164 lb)   BMI 30.00 kg/m²    General: NAD.  Eyes: Anicteric sclerae.  Neck: Supple.  Lungs: Unlabored breathing.  Cardiovascular: No lower extremity edema.  Skin: Intact without erythema.  Neurologic: Sensation intact to light touch.  Psychiatric: Mood and affect are appropriate.    Right Ankle Exam     Tenderness   The patient is experiencing tenderness in the ATF.  Swelling: none    Range of Motion   Dorsiflexion:  normal   Plantar flexion:  normal   Eversion:  normal   Inversion:  abnormal     Other   Erythema: absent  Scars: absent  Sensation: normal  Pulse: present     Comments:  Normal Hopkin's and Kleiger's test.  No plantar bruising/swelling and no pain with midfoot compression.  Sensory-motor testing is WNL.  WWP.             Procedures              "

## 2024-11-12 ENCOUNTER — APPOINTMENT (OUTPATIENT)
Dept: LAB | Facility: AMBULARY SURGERY CENTER | Age: 34
End: 2024-11-12
Payer: COMMERCIAL

## 2024-11-15 RX ORDER — CEPHALEXIN 500 MG/1
500 CAPSULE ORAL
COMMUNITY
Start: 2024-09-10

## 2024-11-15 RX ORDER — OXYCODONE HYDROCHLORIDE 5 MG/1
5 TABLET ORAL
COMMUNITY
Start: 2024-09-10

## 2024-11-15 RX ORDER — ONDANSETRON 4 MG/1
4 TABLET, ORALLY DISINTEGRATING ORAL
COMMUNITY
Start: 2024-09-10

## 2024-11-15 RX ORDER — GABAPENTIN 300 MG/1
300 CAPSULE ORAL
COMMUNITY
Start: 2024-09-10

## 2024-11-15 NOTE — PRE-PROCEDURE INSTRUCTIONS
Pre-Surgery Instructions:   Medication Instructions    cephalexin (KEFLEX) 500 mg capsule Instructions provided by MD- using POST OP per surgeon -pt aware     gabapentin (NEURONTIN) 300 mg capsule Instructions provided by MD-using post op per surgeon - pt aware    Multiple Vitamins-Minerals (multivitamin with minerals) tablet Instructions provided by MD-Dr downey wanted to continue instructed to use up to and including 11/20    norgestimate-ethinyl estradiol (MonoNessa) 0.25-35 MG-MCG per tablet Take night before surgery- did instruct pt she my confirm with prescribing MD on use prior to surgery if wanting to confirm    ondansetron (ZOFRAN-ODT) 4 mg disintegrating tablet Instructions provided by MD-using POST OP per surgeon - pt aware     oxyCODONE (ROXICODONE) 5 immediate release tablet Instructions provided by MD- using POST op per surgeon - pt aware     SUMAtriptan (IMITREX) 50 mg tablet Uses PRN- OK to take day of surgery- if needed may take DOS with sip of water     Pt instructed on use of cleanser for DOS and instructions for showering both night before and DOS reviewed with pt - pt verbalized understanding of instructions given  Pt also instructed on no hair or body products on skin for DOS.  No shaving with a razor blade for 7 days prior to surgery to decrease any incident of infection - electric razor is ok to use up till 24 hrs prior to DOS.  Pt instructed that if with any changes in health status between now and DOS - notify surgeon office.  Tylenol is ok to use as needed up to and including DOS with sips of water - if needed - did instruct NO NSAIDS to be used at least 3 days prior to surgery - or if given a longer hold time of NSAIDS from MD please follow MD hold instructions.  Instructed to bring photo ID and medical insurance card for DOS as forms of identification for DOS.  Also instructed pt on no jewelry or body piercings, no valuables on body for DOS. Contact lenses, if worn - need to be removed for  DOS.  Pt instructed does need transport home after surgery- pt is not allowed to drive.    Medication instructions for day surgery reviewed. Please use only a sip of water to take your instructed medications. Avoid all over the counter vitamins, supplements and NSAIDS for one week prior to surgery per anesthesia guidelines. Tylenol is ok to take as needed.     You will receive a call one business day prior to surgery with an arrival time and hospital directions. If your surgery is scheduled on a Monday, the hospital will be calling you on the Friday prior to your surgery. If you have not heard from anyone by 8pm, please call the hospital supervisor through the hospital  at [phone]. ([place] [phone] or [place] [phone]).    Do not eat or drink anything after midnight the night before your surgery, including candy, mints, lifesavers, or chewing gum. Do not drink alcohol 24hrs before your surgery. Try not to smoke at least 24hrs before your surgery.       Follow the pre surgery showering instructions as listed in the “My Surgical Experience Booklet” or otherwise provided by your surgeon's office. Do not use a blade to shave the surgical area 1 week before surgery. It is okay to use a clean electric clippers up to 24 hours before surgery. Do not apply any lotions, creams, including makeup, cologne, deodorant, or perfumes after showering on the day of your surgery. Do not use dry shampoo, hair spray, hair gel, or any type of hair products.     No contact lenses, eye make-up, or artificial eyelashes. Remove nail polish, including gel polish, and any artificial, gel, or acrylic nails if possible. Remove all jewelry including rings and body piercing jewelry.     Wear causal clothing that is easy to take on and off. Consider your type of surgery.    Keep any valuables, jewelry, piercings at home. Please bring any specially ordered equipment (sling, braces) if indicated.    Arrange for a responsible  person to drive you to and from the hospital on the day of your surgery. Please confirm the visitor policy for the day of your procedure when you receive your phone call with an arrival time.     Call the surgeon's office with any new illnesses, exposures, or additional questions prior to surgery.    Please reference your “My Surgical Experience Booklet” for additional information to prepare for your upcoming surgery.

## 2024-11-21 ENCOUNTER — HOSPITAL ENCOUNTER (OUTPATIENT)
Facility: HOSPITAL | Age: 34
Setting detail: OUTPATIENT SURGERY
Discharge: HOME | End: 2024-11-21
Attending: PLASTIC SURGERY | Admitting: PLASTIC SURGERY
Payer: COMMERCIAL

## 2024-11-21 ENCOUNTER — ANESTHESIA EVENT (OUTPATIENT)
Dept: PERIOP | Facility: HOSPITAL | Age: 34
End: 2024-11-21
Payer: COMMERCIAL

## 2024-11-21 ENCOUNTER — ANESTHESIA (OUTPATIENT)
Dept: PERIOP | Facility: HOSPITAL | Age: 34
End: 2024-11-21
Payer: COMMERCIAL

## 2024-11-21 VITALS
BODY MASS INDEX: 29.26 KG/M2 | RESPIRATION RATE: 18 BRPM | WEIGHT: 160 LBS | HEART RATE: 97 BPM | DIASTOLIC BLOOD PRESSURE: 93 MMHG | OXYGEN SATURATION: 100 % | SYSTOLIC BLOOD PRESSURE: 134 MMHG | TEMPERATURE: 97.8 F

## 2024-11-21 LAB
EXT PREGNANCY TEST URINE: NEGATIVE
EXT. CONTROL: NORMAL

## 2024-11-21 PROCEDURE — 81025 URINE PREGNANCY TEST: CPT | Performed by: PLASTIC SURGERY

## 2024-11-21 RX ORDER — ONDANSETRON 4 MG/1
4 TABLET, ORALLY DISINTEGRATING ORAL ONCE
Status: COMPLETED | OUTPATIENT
Start: 2024-11-21 | End: 2024-11-21

## 2024-11-21 RX ORDER — CEFAZOLIN SODIUM 1 G/50ML
1000 SOLUTION INTRAVENOUS ONCE
Status: DISCONTINUED | OUTPATIENT
Start: 2024-11-21 | End: 2024-11-21 | Stop reason: HOSPADM

## 2024-11-21 RX ORDER — GABAPENTIN 300 MG/1
300 CAPSULE ORAL ONCE
Status: COMPLETED | OUTPATIENT
Start: 2024-11-21 | End: 2024-11-21

## 2024-11-21 RX ORDER — SCOLOPAMINE TRANSDERMAL SYSTEM 1 MG/1
1 PATCH, EXTENDED RELEASE TRANSDERMAL ONCE
Status: DISCONTINUED | OUTPATIENT
Start: 2024-11-21 | End: 2024-11-21 | Stop reason: HOSPADM

## 2024-11-21 RX ORDER — ACETAMINOPHEN 325 MG/1
650 TABLET ORAL ONCE
Status: COMPLETED | OUTPATIENT
Start: 2024-11-21 | End: 2024-11-21

## 2024-11-21 RX ADMIN — ONDANSETRON 4 MG: 4 TABLET, ORALLY DISINTEGRATING ORAL at 09:16

## 2024-11-21 RX ADMIN — SCOPOLAMINE 1 PATCH: 1.5 PATCH, EXTENDED RELEASE TRANSDERMAL at 09:30

## 2024-11-21 RX ADMIN — GABAPENTIN 300 MG: 300 CAPSULE ORAL at 09:15

## 2024-11-21 RX ADMIN — ACETAMINOPHEN 650 MG: 325 TABLET ORAL at 09:15

## 2024-11-21 NOTE — ANESTHESIA PREPROCEDURE EVALUATION
Procedure:  BREAST REDUCTION (Bilateral: Breast)    Relevant Problems   NEURO/PSYCH   (+) Other headache syndrome      Rheumatology   (+) Sprain of anterior talofibular ligament of right ankle      Orthopedic/Musculoskeletal   (+) Pain, joint, ankle and foot, right      Other   (+) Abnormal EKG   (+) Large breasts   (+) Vaginal discharge      Lab Results   Component Value Date    SODIUM 136 11/12/2024    K 4.4 11/12/2024     11/12/2024    CO2 23 11/12/2024    AGAP 11 11/12/2024    BUN 13 11/12/2024    CREATININE 0.74 11/12/2024    GLUC 90 11/12/2024    GLUF 83 06/29/2022    CALCIUM 9.1 11/12/2024    AST 13 06/29/2022    ALT 22 06/29/2022    ALKPHOS 59 06/29/2022    TP 7.5 06/29/2022    TBILI 0.52 06/29/2022    EGFR 106 11/12/2024     Lab Results   Component Value Date    WBC 6.81 11/12/2024    HGB 14.2 11/12/2024    HCT 44.3 11/12/2024    MCV 88 11/12/2024     11/12/2024              Anesthesia Plan  ASA Score- 2     Anesthesia Type- general with ASA Monitors.         Additional Monitors:     Airway Plan: ETT.           Plan Factors-Exercise tolerance (METS): >4 METS.                          Induction- intravenous.    Postoperative Plan-         Informed Consent- Anesthetic plan and risks discussed with patient.  I personally reviewed this patient with the CRNA. Discussed and agreed on the Anesthesia Plan with the CRNA..

## 2024-11-21 NOTE — NURSING NOTE
Pt had second thoughts about her surgery and canceled her procedure. Bee from OR spoke to pt, Dr Rosado is aware. Pt left unit with her .

## 2024-12-09 DIAGNOSIS — Z30.41 ENCOUNTER FOR SURVEILLANCE OF CONTRACEPTIVE PILLS: ICD-10-CM

## 2024-12-10 RX ORDER — NORGESTIMATE AND ETHINYL ESTRADIOL 0.25-0.035
1 KIT ORAL DAILY
Qty: 28 TABLET | Refills: 0 | Status: SHIPPED | OUTPATIENT
Start: 2024-12-10

## 2025-01-16 ENCOUNTER — ANNUAL EXAM (OUTPATIENT)
Age: 35
End: 2025-01-16
Payer: COMMERCIAL

## 2025-01-16 VITALS
BODY MASS INDEX: 29.22 KG/M2 | SYSTOLIC BLOOD PRESSURE: 114 MMHG | HEIGHT: 62 IN | DIASTOLIC BLOOD PRESSURE: 72 MMHG | WEIGHT: 158.8 LBS

## 2025-01-16 DIAGNOSIS — Z01.419 ENCOUNTER FOR GYNECOLOGICAL EXAMINATION (GENERAL) (ROUTINE) WITHOUT ABNORMAL FINDINGS: Primary | ICD-10-CM

## 2025-01-16 DIAGNOSIS — Z30.41 ENCOUNTER FOR SURVEILLANCE OF CONTRACEPTIVE PILLS: ICD-10-CM

## 2025-01-16 PROCEDURE — S0612 ANNUAL GYNECOLOGICAL EXAMINA: HCPCS | Performed by: OBSTETRICS & GYNECOLOGY

## 2025-01-16 RX ORDER — NORGESTIMATE AND ETHINYL ESTRADIOL 0.25-0.035
1 KIT ORAL DAILY
Qty: 84 TABLET | Refills: 3 | Status: SHIPPED | OUTPATIENT
Start: 2025-01-16

## 2025-01-16 NOTE — PROGRESS NOTES
Assessment/Plan:     1. Encounter for gynecological examination (general) (routine) without abnormal findings (Primary)      2. Encounter for surveillance of contraceptive pills    - norgestimate-ethinyl estradiol (MonoNessa) 0.25-35 MG-MCG per tablet; Take 1 tablet by mouth daily  Dispense: 84 tablet; Refill: 3        Subjective      Chelsy Isabel is a 34 y.o. female who presents for annual exam. Periods are well-controlled on current OCP.  She denies any breast, urinary or sexual concerns.    Current contraception: OCP (estrogen/progesterone)  History of abnormal Pap smear: yes  Regular self breast exam: yes  History of abnormal mammogram: no  History of abnormal lipids: no    Menstrual History:    Menarche age: 11  Patient's last menstrual period was 2025 (exact date).  Period Cycle (Days): 28  Period Duration (Days): 5  Period Pattern: Regular  Menstrual Flow: Moderate, Light  Menstrual Control: Tampon  Menstrual Control Change Freq (Hours): 3-4  Dysmenorrhea: (!) Mild  Dysmenorrhea Symptoms: Headache    Past Medical History:   Diagnosis Date    Abnormal ECG 19    Abnormal Pap smear of cervix 2020    Abnormal pap, negative HPV    Herpes     genital herpes x 1  5 yrs ago -no other outbreaks    Migraine     with previous pregnancy    Miscarriage     chemical pregnancy   5 wks    PONV (postoperative nausea and vomiting)     after T&A and     S/P  section 2020    Varicella     childhood chickenpox    Wears contact lenses        Family History   Problem Relation Age of Onset    Osteoporosis Mother     Hypertension Mother     Supraventricular tachycardia Mother     Lymphoma Father     Hypertension Father     Cancer Father         recovered non hodskins lymphoma    Obesity Brother     Breast cancer Maternal Grandmother     Hypertension Maternal Grandmother     Diabetes Maternal Grandmother     Heart disease Maternal Grandmother     Angina Maternal Grandfather     Diabetes  "Maternal Grandfather     Cancer Maternal Grandfather     Heart defect Maternal Grandfather     Heart disease Maternal Grandfather     Diabetes Paternal Grandmother     Obesity Paternal Grandfather     Anesthesia problems Neg Hx        The following portions of the patient's history were reviewed and updated as appropriate: allergies, current medications, past family history, past medical history, past social history, past surgical history, and problem list.    Review of Systems  Pertinent items are noted in HPI.      Objective      /72 (BP Location: Right arm, Patient Position: Sitting, Cuff Size: Large)   Ht 5' 2.25\" (1.581 m)   Wt 72 kg (158 lb 12.8 oz)   LMP 01/01/2025 (Exact Date)   BMI 28.81 kg/m²     General:   alert and oriented, in no acute distress   Heart:  Breasts: regular rate and rhythm  appear normal, no suspicious masses, no skin or nipple changes or axillary nodes.   Lungs: Effort normal   Abdomen: soft, non-tender, without masses or organomegaly   Vulva: normal   Vagina: normal mucosa   Cervix: no lesions   Uterus: normal size, mobile, non-tender   Adnexa:  Bladder: normal adnexa and no mass, fullness, tenderness  Non-tender, no prolapse               "

## 2025-01-19 ENCOUNTER — TELEMEDICINE (OUTPATIENT)
Dept: OTHER | Facility: HOSPITAL | Age: 35
End: 2025-01-19
Payer: COMMERCIAL

## 2025-01-19 VITALS — HEART RATE: 78 BPM | TEMPERATURE: 97.3 F | OXYGEN SATURATION: 98 %

## 2025-01-19 DIAGNOSIS — R05.9 COUGH, UNSPECIFIED TYPE: Primary | ICD-10-CM

## 2025-01-19 PROCEDURE — 99213 OFFICE O/P EST LOW 20 MIN: CPT | Performed by: NURSE PRACTITIONER

## 2025-01-19 NOTE — PATIENT INSTRUCTIONS
This is most likely a post viral cough.  I suspect you did have the flu.  We can get a chest xray. If abnormal will start treatment.  Offered cough medication but you declined.  Go to ER with any worsening symptoms.

## 2025-01-19 NOTE — PROGRESS NOTES
Virtual Regular Visit  Name: Chelsy Isabel      : 1990      MRN: [id number]  Encounter Provider: Your Video Visit Provider  Encounter Date: 2025   Encounter department: VIRTUAL CARE       Verification of patient location:  Patient is located at Home in the following state in which I hold an active license [place] :  Assessment & Plan  Cough, unspecified type    Orders:    XR chest pa and lateral; Future        Encounter provider Your Video Visit Provider    The patient was identified by name and date of birth. Chelsy Isabel was informed that this is a telemedicine visit and that the visit is being conducted through the Epic Embedded platform. She agrees to proceed..  My office door was closed. No one else was in the room.  She acknowledged consent and understanding of privacy and security of the video platform. The patient has agreed to participate and understands they can discontinue the visit at any time.    Patient is aware this is a billable service.     History was obtained from: History obtained from: patient  History of Present Illness     This is a 34 year old female here today for video visit.  She states she started about 1 week ago with fever, cough and congestion.  She thinks she had the flu.  She states fever has resolved.  She states she continues to have cough.  She states she feels it in her chest.  She still has some chest tightness.  She denies any chest pain or sob.       Review of Systems   Constitutional:  Negative for activity change, chills, fatigue and fever.   HENT:  Negative for congestion, rhinorrhea, sinus pressure and sinus pain.    Respiratory:  Positive for cough. Negative for chest tightness, shortness of breath and wheezing.    Gastrointestinal: Negative.    Neurological: Negative.    Psychiatric/Behavioral: Negative.         Objective   Pulse 78   Temp (!) 97.3 °F (36.3 °C)   LMP 2025 (Exact Date)   SpO2 98%     Physical Exam  Constitutional:       General: She  is not in acute distress.     Appearance: Normal appearance. She is not ill-appearing or toxic-appearing.   HENT:      Head: Normocephalic and atraumatic.   Pulmonary:      Effort: Pulmonary effort is normal. No respiratory distress.      Comments: No cough or audible wheezing on video visit.   Neurological:      Mental Status: She is alert and oriented to person, place, and time.   Psychiatric:         Mood and Affect: Mood normal.         Behavior: Behavior normal.         Thought Content: Thought content normal.         Judgment: Judgment normal.         Visit Time  Total Visit Duration: 6 minutes not including the time spent for establishing the audio/video connection.

## 2025-02-03 ENCOUNTER — OFFICE VISIT (OUTPATIENT)
Dept: DERMATOLOGY | Facility: CLINIC | Age: 35
End: 2025-02-03
Payer: COMMERCIAL

## 2025-02-03 VITALS — WEIGHT: 160 LBS | BODY MASS INDEX: 29.03 KG/M2 | TEMPERATURE: 97 F

## 2025-02-03 DIAGNOSIS — D22.60 MULTIPLE BENIGN MELANOCYTIC NEVI OF UPPER EXTREMITY, LOWER EXTREMITY, AND TRUNK: Primary | ICD-10-CM

## 2025-02-03 DIAGNOSIS — D22.5 MULTIPLE BENIGN MELANOCYTIC NEVI OF UPPER EXTREMITY, LOWER EXTREMITY, AND TRUNK: Primary | ICD-10-CM

## 2025-02-03 DIAGNOSIS — L81.4 LENTIGINES: ICD-10-CM

## 2025-02-03 DIAGNOSIS — D23.9 DERMATOFIBROMA: ICD-10-CM

## 2025-02-03 DIAGNOSIS — D22.70 MULTIPLE BENIGN MELANOCYTIC NEVI OF UPPER EXTREMITY, LOWER EXTREMITY, AND TRUNK: Primary | ICD-10-CM

## 2025-02-03 PROCEDURE — 99203 OFFICE O/P NEW LOW 30 MIN: CPT | Performed by: REGISTERED NURSE

## 2025-02-03 NOTE — PROGRESS NOTES
"[facility] Note     Patient Name: Chelsy Isabel  Encounter Date: 2/3/25     Have you been cared for by a [facility] Dermatologist in the last 3 years and, if so, which description applies to you?    NO.   I am considered a \"new\" patient and must complete all patient intake questions. I am FEMALE/of child-bearing potential.    REVIEW OF SYSTEMS:  Have you recently had or currently have any of the following? Recent fever or chills? No  Any non-healing wound? No  Are you pregnant or planning to become pregnant? No  Are you currently or planning to be nursing or breast feeding? No   PAST MEDICAL HISTORY:  Have you personally ever had or currently have any of the following?  If \"YES,\" then please provide more detail. Skin cancer (such as Melanoma, Basal Cell Carcinoma, Squamous Cell Carcinoma?  No  Tuberculosis, HIV/AIDS, Hepatitis B or C: No  Radiation Treatment No   HISTORY OF IMMUNOSUPPRESSION:   Do you have a history of any of the following:  Systemic Immunosuppression such as Diabetes, Biologic or Immunotherapy, Chemotherapy, Organ Transplantation, Bone Marrow Transplantation or Prednsione?  No    Answering \"YES\" requires the addition of the dotphrase \"IMMUNOSUPPRESSED\" as the first diagnosis of the patient's visit.   FAMILY HISTORY:  Any \"first degree relatives\" (parent, brother, sister, or child) with the following?    Skin Cancer, Pancreatic or Other Cancer? YES, father - melanoma, Non Hodgkin's lymphoma   PATIENT EXPERIENCE:    Do you want the Dermatologist to perform a COMPLETE skin exam today including a clinical examination under the \"bra and underwear\" areas?  Yes  If necessary, do we have your permission to call and leave a detailed message on your Preferred Phone number that includes your specific medical information?  Yes      Allergies   Allergen Reactions    Sulfa Antibiotics Other (See Comments) and Hives     Rash-as infant      Current Outpatient Medications:     Multiple " "Vitamins-Minerals (multivitamin with minerals) tablet, Take 1 tablet by mouth daily Surgeon prescribed to use for DOS - instructed to use up to and including 11/20/24 for DOS 11/21/24, Disp: , Rfl:     norgestimate-ethinyl estradiol (MonoNessa) 0.25-35 MG-MCG per tablet, Take 1 tablet by mouth daily, Disp: 84 tablet, Rfl: 3    SUMAtriptan (IMITREX) 50 mg tablet, Take 1 tablet (50 mg total) by mouth once as needed for migraine may repeat in 2 hours if necessary, Disp: 10 tablet, Rfl: 5        Whom besides the patient is providing clinical information about today's encounter?   NO ADDITIONAL HISTORIAN (patient alone provided history)    Physical Exam and Assessment/Plan by Diagnosis:        SOLAR LENTIGINES   OTHER SKIN CHANGES DUE TO CHRONIC EXPOSURE TO NONIONIZING RADIATION  Physical Exam:  Anatomic Location Affected:  Sun exposed areas of back, chest, arms, legs  Morphological Description:  Multiple scattered brown to tan evenly pigmented macules   Denies pain, itch, bleeding. No treatments tried. Present for months - years. Reports getting newer lesions with sun exposure.      Assessment and Plan:  Based on a thorough discussion of this condition and the management approach to it (including a comprehensive discussion of the known risks, side effects and potential benefits of treatment), the patient (family) agrees to implement the following specific plan:  Reassure benign  Use sun protection.  Apply SPF 30 or higher at least three times a day.  Wear sun protecting clothing and hats.         MULTIPLE MELANOCYTIC NEVI (\"Moles\")  Physical Exam:  Anatomic Location Affected: Trunk, face, and extremities  Morphological Description:  Scattered, round to ovoid, symmetrical-appearing, even bordered, skin colored to dark brown macules/papules  Denies pain, itch, bleeding. No treatments tried. Present for years. Present constantly; no modifying factors which make it worse or better. Denies actively changing or growing moles. "      Assessment and Plan:  Based on a thorough discussion of this condition and the management approach to it (including a comprehensive discussion of the known risks, side effects and potential benefits of treatment), the patient (family) agrees to implement the following specific plan:  Reassure benign  Monitor for changes  Use sun protection.  Apply SPF 30 or higher at least three times a day.  Wear sun protecting clothing and hats.     Worrisome signs of skin malignancy discussed, questions answered. Regular self-skin check discussed. Advised to call or return to office if patient notices any spots of concern, rapidly growing/changing lesions, bleeding lesions, non-healing lesions. Advised regular SPF use.        DERMATOFIBROMA  Physical Exam:  Anatomic Location Affected:  left posterior shoulder  Morphological Description:  fibrotic brown papule   Pertinent Positives:  Pertinent Negatives:    Additional History of Present Condition:  noted on exam.    Assessment and Plan:  Based on a thorough discussion of this condition and the management approach to it (including a comprehensive discussion of the known risks, side effects and potential benefits of treatment), the patient (family) agrees to implement the following specific plan:  Reassured benign.      Scribe Attestation      I,:  Sabi Bateman MA am acting as a scribe while in the presence of the attending physician.:       I,:  Shimon Be MD personally performed the services described in this documentation    as scribed in my presence.:

## 2025-02-28 ENCOUNTER — OFFICE VISIT (OUTPATIENT)
Dept: INTERNAL MEDICINE CLINIC | Facility: CLINIC | Age: 35
End: 2025-02-28
Payer: COMMERCIAL

## 2025-02-28 VITALS
WEIGHT: 154.4 LBS | HEART RATE: 72 BPM | BODY MASS INDEX: 27.36 KG/M2 | HEIGHT: 63 IN | TEMPERATURE: 96.9 F | SYSTOLIC BLOOD PRESSURE: 106 MMHG | OXYGEN SATURATION: 97 % | DIASTOLIC BLOOD PRESSURE: 66 MMHG

## 2025-02-28 DIAGNOSIS — Z00.00 WELLNESS EXAMINATION: Primary | ICD-10-CM

## 2025-02-28 DIAGNOSIS — G43.109 MIGRAINE WITH AURA AND WITHOUT STATUS MIGRAINOSUS, NOT INTRACTABLE: ICD-10-CM

## 2025-02-28 DIAGNOSIS — N62 LARGE BREASTS: ICD-10-CM

## 2025-02-28 PROCEDURE — 99395 PREV VISIT EST AGE 18-39: CPT | Performed by: INTERNAL MEDICINE

## 2025-02-28 NOTE — PATIENT INSTRUCTIONS
Problem List Items Addressed This Visit          Cardiovascular and Mediastinum    Migraine with aura and without status migrainosus, not intractable    Patient has been doing well, she has sumatriptan to take, she has used this rarely.  Patient seems to get some around her period.            Other    Wellness examination - Primary    Discussed preventative health, cancer screening, immunizations, and safety issues.  Patient has been watching her diet, exercising regularly, made these changes about 7 months ago.  I recommend yearly flu shot, patient does not want.  Last Tdap vaccination 4/21/2020.  Most recent labs reviewed with patient         Large breasts    Patient decided to pursue lifestyle changes before committing to breast reduction surgery.  Patient has been exercising, eating a healthy diet, lost about 15 pounds

## 2025-02-28 NOTE — PROGRESS NOTES
Name: Chelsy Isabel      : 1990      MRN: [id number]  Encounter Provider: Ted Portillo MD  Encounter Date: 2025   Encounter department: [facility]  :  Assessment & Plan  Wellness examination  Discussed preventative health, cancer screening, immunizations, and safety issues.  Patient has been watching her diet, exercising regularly, made these changes about 7 months ago.  I recommend yearly flu shot, patient does not want.  Last Tdap vaccination 2020.  Most recent labs reviewed with patient       Migraine with aura and without status migrainosus, not intractable  Patient has been doing well, she has sumatriptan to take, she has used this rarely.  Patient seems to get some around her period.       Large breasts  Patient decided to pursue lifestyle changes before committing to breast reduction surgery.  Patient has been exercising, eating a healthy diet, lost about 15 pounds              History of Present Illness   Wellness: Patient's been eating a healthy diet, exercising, no smoking cigarettes, gets routine dental care      Review of Systems   Constitutional:  Negative for chills, fatigue and fever.   HENT:  Negative for congestion, nosebleeds, postnasal drip, sore throat and trouble swallowing.    Eyes:  Negative for pain.   Respiratory:  Negative for cough, chest tightness, shortness of breath and wheezing.    Cardiovascular:  Negative for chest pain, palpitations and leg swelling.   Gastrointestinal:  Negative for abdominal pain, constipation, diarrhea, nausea and vomiting.   Endocrine: Negative for polydipsia and polyuria.   Genitourinary:  Negative for dysuria, flank pain and hematuria.   Musculoskeletal:  Negative for arthralgias.   Skin:  Negative for rash.   Neurological:  Negative for dizziness, tremors, light-headedness and headaches.   Hematological:  Does not bruise/bleed easily.   Psychiatric/Behavioral:  Negative for confusion and dysphoric mood. The patient  "is not nervous/anxious.        Objective   /66 (BP Location: Left arm, Patient Position: Sitting, Cuff Size: Standard)   Pulse 72   Temp (!) 96.9 °F (36.1 °C) (Tympanic)   Ht 5' 2.5\" (1.588 m)   Wt 70 kg (154 lb 6.4 oz)   SpO2 97%   BMI 27.79 kg/m²      Physical Exam  Vitals reviewed.   Constitutional:       Appearance: Normal appearance. She is well-developed.   HENT:      Head: Normocephalic and atraumatic.      Right Ear: External ear normal.      Left Ear: External ear normal.      Nose: Nose normal.   Eyes:      General: No scleral icterus.     Conjunctiva/sclera: Conjunctivae normal.   Neck:      Thyroid: No thyromegaly.      Trachea: No tracheal deviation.   Cardiovascular:      Rate and Rhythm: Normal rate and regular rhythm.      Heart sounds: Normal heart sounds. No murmur heard.  Pulmonary:      Effort: No respiratory distress.      Breath sounds: Normal breath sounds. No wheezing or rales.   Abdominal:      General: Bowel sounds are normal.      Palpations: Abdomen is soft. There is no mass.      Tenderness: There is no abdominal tenderness. There is no guarding.   Musculoskeletal:      Cervical back: Normal range of motion and neck supple.      Right lower leg: No edema.      Left lower leg: No edema.   Lymphadenopathy:      Cervical: No cervical adenopathy.   Skin:     Coloration: Skin is not jaundiced or pale.   Neurological:      General: No focal deficit present.      Mental Status: She is alert and oriented to person, place, and time.   Psychiatric:         Behavior: Behavior normal.         Thought Content: Thought content normal.         Judgment: Judgment normal.         "

## 2025-02-28 NOTE — ASSESSMENT & PLAN NOTE
Discussed preventative health, cancer screening, immunizations, and safety issues.  Patient has been watching her diet, exercising regularly, made these changes about 7 months ago.  I recommend yearly flu shot, patient does not want.  Last Tdap vaccination 4/21/2020.  Most recent labs reviewed with patient     
Patient decided to pursue lifestyle changes before committing to breast reduction surgery.  Patient has been exercising, eating a healthy diet, lost about 15 pounds     
Patient has been doing well, she has sumatriptan to take, she has used this rarely.  Patient seems to get some around her period.     
Continue to assess, MD to see pt

## 2025-03-10 DIAGNOSIS — G44.89 OTHER HEADACHE SYNDROME: ICD-10-CM

## 2025-03-11 RX ORDER — SUMATRIPTAN 50 MG/1
TABLET, FILM COATED ORAL
Qty: 10 TABLET | Refills: 0 | Status: SHIPPED | OUTPATIENT
Start: 2025-03-11

## 2025-07-03 ENCOUNTER — APPOINTMENT (OUTPATIENT)
Dept: LAB | Facility: AMBULARY SURGERY CENTER | Age: 35
End: 2025-07-03
Attending: PREVENTIVE MEDICINE

## 2025-07-03 DIAGNOSIS — Z00.8 HEALTH EXAMINATION IN POPULATION SURVEYS: ICD-10-CM

## 2025-07-03 LAB
CHOLEST SERPL-MCNC: 158 MG/DL (ref ?–200)
EST. AVERAGE GLUCOSE BLD GHB EST-MCNC: 108 MG/DL
HBA1C MFR BLD: 5.4 %
HDLC SERPL-MCNC: 55 MG/DL
LDLC SERPL CALC-MCNC: 73 MG/DL (ref 0–100)
NONHDLC SERPL-MCNC: 103 MG/DL
TRIGL SERPL-MCNC: 148 MG/DL (ref ?–150)

## 2025-07-03 PROCEDURE — 36415 COLL VENOUS BLD VENIPUNCTURE: CPT

## 2025-07-03 PROCEDURE — 80061 LIPID PANEL: CPT

## 2025-07-03 PROCEDURE — 83036 HEMOGLOBIN GLYCOSYLATED A1C: CPT

## (undated) DEVICE — LARGE, DISPOSABLE ALEXIS O C-SECTION PROTECTOR - RETRACTOR: Brand: ALEXIS ® O C-SECTION PROTECTOR - RETRACTOR

## (undated) DEVICE — SUT VICRYL 0 CT-1 36 IN J946H

## (undated) DEVICE — GLOVE PI ULTRA TOUCH SZ.6.5

## (undated) DEVICE — MEDI-VAC YANKAUER SUCTION HANDLE W/STRAIGHT TIP & CONTROL VENT: Brand: CARDINAL HEALTH

## (undated) DEVICE — ABDOMINAL PAD: Brand: DERMACEA

## (undated) DEVICE — Device

## (undated) DEVICE — GLOVE INDICATOR PI UNDERGLOVE SZ 6.5 BLUE

## (undated) DEVICE — TELFA NON-ADHERENT ABSORBENT DRESSING: Brand: TELFA

## (undated) DEVICE — GAUZE SPONGES,16 PLY: Brand: CURITY

## (undated) DEVICE — SUT VICRYL 0 CTX 36 IN J978H

## (undated) DEVICE — SKIN MARKER DUAL TIP WITH RULER CAP, FLEXIBLE RULER AND LABELS: Brand: DEVON

## (undated) DEVICE — SUT MONOCRYL 4-0 PS-2 27 IN Y426H

## (undated) DEVICE — CHLORAPREP HI-LITE 26ML ORANGE

## (undated) DEVICE — PACK C-SECTION PBDS

## (undated) RX ORDER — OFLOXACIN 3 MG/ML
1 SOLUTION/ DROPS OPHTHALMIC
Start: 2023-09-29